# Patient Record
Sex: MALE | Race: BLACK OR AFRICAN AMERICAN | NOT HISPANIC OR LATINO | Employment: OTHER | URBAN - NONMETROPOLITAN AREA
[De-identification: names, ages, dates, MRNs, and addresses within clinical notes are randomized per-mention and may not be internally consistent; named-entity substitution may affect disease eponyms.]

---

## 2017-01-26 DIAGNOSIS — N20.0 CALCULUS OF KIDNEY: Primary | ICD-10-CM

## 2017-01-26 LAB — PSA SERPL-MCNC: 0.67 NG/ML (ref 0–4)

## 2017-02-01 DIAGNOSIS — N20.0 CALCULUS OF KIDNEY: Primary | ICD-10-CM

## 2017-02-02 ENCOUNTER — OFFICE VISIT (OUTPATIENT)
Dept: UROLOGY | Facility: CLINIC | Age: 67
End: 2017-02-02

## 2017-02-02 ENCOUNTER — HOSPITAL ENCOUNTER (OUTPATIENT)
Dept: GENERAL RADIOLOGY | Facility: HOSPITAL | Age: 67
Discharge: HOME | End: 2017-02-02
Attending: UROLOGY | Admitting: UROLOGY

## 2017-02-02 VITALS
WEIGHT: 269.6 LBS | TEMPERATURE: 97.6 F | BODY MASS INDEX: 34.6 KG/M2 | SYSTOLIC BLOOD PRESSURE: 150 MMHG | HEIGHT: 74 IN | DIASTOLIC BLOOD PRESSURE: 76 MMHG

## 2017-02-02 DIAGNOSIS — N40.1 BPH (BENIGN PROSTATIC HYPERTROPHY) WITH URINARY OBSTRUCTION: Primary | ICD-10-CM

## 2017-02-02 DIAGNOSIS — N20.0 RENAL STONE: ICD-10-CM

## 2017-02-02 DIAGNOSIS — N13.8 BPH (BENIGN PROSTATIC HYPERTROPHY) WITH URINARY OBSTRUCTION: Primary | ICD-10-CM

## 2017-02-02 DIAGNOSIS — N20.1 URETERAL STONE: ICD-10-CM

## 2017-02-02 DIAGNOSIS — N20.0 CALCULUS OF KIDNEY: ICD-10-CM

## 2017-02-02 DIAGNOSIS — N28.89 URETEROCELE: ICD-10-CM

## 2017-02-02 LAB
BILIRUB BLD-MCNC: NEGATIVE MG/DL
CLARITY, POC: CLEAR
COLOR UR: YELLOW
GLUCOSE UR STRIP-MCNC: NEGATIVE MG/DL
KETONES UR QL: NEGATIVE
LEUKOCYTE EST, POC: ABNORMAL
NITRITE UR-MCNC: NEGATIVE MG/ML
PH UR: 6.5 [PH] (ref 5–8)
PROT UR STRIP-MCNC: NEGATIVE MG/DL
RBC # UR STRIP: ABNORMAL /UL
SP GR UR: 1.02 (ref 1–1.03)
UROBILINOGEN UR QL: ABNORMAL

## 2017-02-02 PROCEDURE — 74000 HC ABDOMEN KUB: CPT

## 2017-02-02 PROCEDURE — 99214 OFFICE O/P EST MOD 30 MIN: CPT | Performed by: UROLOGY

## 2017-02-02 PROCEDURE — 81003 URINALYSIS AUTO W/O SCOPE: CPT | Performed by: UROLOGY

## 2017-02-02 RX ORDER — AMLODIPINE BESYLATE 5 MG/1
5 TABLET ORAL DAILY
Refills: 5 | COMMUNITY
Start: 2017-01-19

## 2017-02-02 RX ORDER — ALLOPURINOL 300 MG/1
300 TABLET ORAL DAILY
Refills: 3 | COMMUNITY
Start: 2017-01-19

## 2017-02-02 RX ORDER — MELOXICAM 7.5 MG/1
7.5 TABLET ORAL DAILY
COMMUNITY
Start: 2017-01-31

## 2017-02-02 RX ORDER — ATENOLOL 50 MG/1
50 TABLET ORAL DAILY
Refills: 3 | COMMUNITY
Start: 2017-01-05

## 2017-02-02 NOTE — PROGRESS NOTES
Subjective    Mr. Durán is 66 y.o. male    CHIEF COMPLAINT: BPH    The patient comes back today for follow-up of BPH clinically he is having a little more symptoms of low but urgency frequency as a way score today is 17 he has not had any flank pain or gross hematuria he has not any medications for his prostate he does take some allopurinol  his PSA is 0.6    We have also followed him for left renal calculus and on today's KUB looks like it is moved into the upper ureter from the renal pelvis where it has been for last 2-3 years    KUB independent review    A KUB is available for me to review today.  The image is inspected for a bowel gas pattern and the general bone structure of the spine and pelvis. The kidneys are then inspected closely.  Renal outline is noted if identifiable. The kidney, collecting system, and anticipated path of the ureter are examined for calcifications including those in the true pelvis.  This film reveals:    On the right there are no calcificaitons seen in the kidney or the expected course of the ureter. .    On the left there is a single proximal ureteral stone measuring 9 mm.    He also has a history of a ureterocele on the left that was resected and a stone was removed       History of Present Illness      The following portions of the patient's history were reviewed and updated as appropriate: allergies, current medications, past family history, past medical history, past social history, past surgical history and problem list.    Review of Systems   Constitutional: Negative.  Negative for chills and fever.   Gastrointestinal: Negative for abdominal distention, abdominal pain, anal bleeding, blood in stool and nausea.   Genitourinary: Positive for difficulty urinating, frequency and urgency. Negative for dysuria, flank pain, hematuria, penile pain and penile swelling.   Psychiatric/Behavioral: Negative.  Negative for agitation and confusion.         Current Outpatient Prescriptions:  "  •  Multiple Vitamins-Minerals (MULTIVITAMIN ADULT PO), Take  by mouth., Disp: , Rfl:   •  allopurinol (ZYLOPRIM) 300 MG tablet, , Disp: , Rfl: 3  •  amLODIPine (NORVASC) 5 MG tablet, , Disp: , Rfl: 5  •  atenolol (TENORMIN) 50 MG tablet, , Disp: , Rfl: 3  •  meloxicam (MOBIC) 7.5 MG tablet, , Disp: , Rfl:     Past Medical History   Diagnosis Date   • BPH with urinary obstruction    • Calculus of kidney    • Calculus of ureter    • Hypertension    • Microscopic hematuria    • Nocturia        Past Surgical History   Procedure Laterality Date   • Hemorrhoidectomy     • Knee surgery     • Carpal tunnel release         Social History     Social History   • Marital status:      Spouse name: N/A   • Number of children: N/A   • Years of education: N/A     Social History Main Topics   • Smoking status: Never Smoker   • Smokeless tobacco: None   • Alcohol use No   • Drug use: None   • Sexual activity: Not Asked     Other Topics Concern   • None     Social History Narrative   • None       Family History   Problem Relation Age of Onset   • No Known Problems Father    • No Known Problems Mother        Objective    Visit Vitals   • /76   • Temp 97.6 °F (36.4 °C)   • Ht 74\" (188 cm)   • Wt 269 lb 9.6 oz (122 kg)   • BMI 34.61 kg/m2       Physical Exam   Constitutional: He is oriented to person, place, and time. He appears well-developed and well-nourished.   Pulmonary/Chest: Effort normal.   Abdominal: Soft. He exhibits no distension and no mass. There is no tenderness. There is no rebound and no guarding. No hernia.   Genitourinary: Penis normal. Rectal exam shows no mass, no tenderness and anal tone normal. Enlarged: for the age of the patient. Right testis shows no mass, no swelling and no tenderness. Left testis shows no mass, no swelling and no tenderness. No hypospadias. No discharge found.   Genitourinary Comments:  The urethral meatus normal in position without evidence of stricture. Epididymis without mass " or tenderness. Vas Deferens is palpably normal.Anus and perineum without mass or tenderness. The prostate is approximately 25 ml. It is Symmetric, with a Soft consistency. There are no nodules present. . The seminal vesicles are Not palpable due to the size of the prostate.     Neurological: He is alert and oriented to person, place, and time.   Vitals reviewed.        Orders Only on 01/26/2017   Component Date Value Ref Range Status   • PSA 01/26/2017 0.668  0.000 - 4.000 ng/mL Final       Results for orders placed or performed in visit on 02/02/17   POC Urinalysis Dipstick, Automated   Result Value Ref Range    Color Yellow Yellow, Straw, Dark Yellow, Adrianne    Clarity, UA Clear Clear    Glucose, UA Negative Negative, 1000 mg/dL (3+) mg/dL    Bilirubin Negative Negative    Ketones, UA Negative Negative    Specific Gravity  1.025 1.005 - 1.030    Blood, UA Small (A) Negative    pH, Urine 6.5 5.0 - 8.0    Protein, POC Negative Negative mg/dL    Urobilinogen, UA 1 E.U./dL  (A) Normal    Leukocytes Trace (A) Negative    Nitrite, UA Negative Negative       Assessment and Plan    Martin was seen today for calculus of kidney.    Diagnoses and all orders for this visit:    BPH (benign prostatic hypertrophy) with urinary obstruction  -     POC Urinalysis Dipstick, Automated    Renal stone  -     XR Abdomen KUB; Future  -     US Renal Bilateral; Future    Ureterocele    Ureteral stone      Plan--from a BPH point review he has a little bit more symptoms and he does not want taking medications at this time    I did discuss a change in the stone with the patient is now on the proximal ureter.  He is basically asymptomatic he has had a pretty good size stone previously that got them ureterocele so we discussed the options were to watch this along a little bit on was seen back in a couple weeks with an ultrasound and KUB if he starts having severe pain fever etc. prior then he will call    [employer] Dragon/Transcription disclaimer:   Much of this encounter note is an electronic transcription/translation of spoken language to printed text. The electronic translation of spoken language may permit erroneous, or at times, nonsensical words or phrases to be inadvertently transcribed; although I have reviewed the note for such errors, some may still exist.

## 2017-02-17 ENCOUNTER — HOSPITAL ENCOUNTER (OUTPATIENT)
Dept: ULTRASOUND IMAGING | Facility: HOSPITAL | Age: 67
Discharge: HOME | End: 2017-02-17
Attending: UROLOGY | Admitting: UROLOGY

## 2017-02-17 DIAGNOSIS — N20.0 RENAL STONE: ICD-10-CM

## 2017-02-17 PROCEDURE — 76775 US EXAM ABDO BACK WALL LIM: CPT

## 2017-02-22 ENCOUNTER — HOSPITAL ENCOUNTER (OUTPATIENT)
Dept: GENERAL RADIOLOGY | Facility: HOSPITAL | Age: 67
Discharge: HOME | End: 2017-02-22
Attending: UROLOGY | Admitting: UROLOGY

## 2017-02-22 ENCOUNTER — OFFICE VISIT (OUTPATIENT)
Dept: UROLOGY | Facility: CLINIC | Age: 67
End: 2017-02-22

## 2017-02-22 VITALS — BODY MASS INDEX: 31.24 KG/M2 | TEMPERATURE: 98.8 F | HEIGHT: 78 IN | WEIGHT: 270 LBS

## 2017-02-22 DIAGNOSIS — N40.1 BPH (BENIGN PROSTATIC HYPERTROPHY) WITH URINARY OBSTRUCTION: ICD-10-CM

## 2017-02-22 DIAGNOSIS — N20.0 RENAL STONE: ICD-10-CM

## 2017-02-22 DIAGNOSIS — N20.1 URETERAL STONE: Primary | ICD-10-CM

## 2017-02-22 DIAGNOSIS — N13.8 BPH (BENIGN PROSTATIC HYPERTROPHY) WITH URINARY OBSTRUCTION: ICD-10-CM

## 2017-02-22 DIAGNOSIS — N20.0 CALCULUS OF KIDNEY: ICD-10-CM

## 2017-02-22 DIAGNOSIS — N13.2 HYDRONEPHROSIS WITH RENAL AND URETERAL CALCULUS OBSTRUCTION: ICD-10-CM

## 2017-02-22 DIAGNOSIS — N28.89 URETEROCELE: ICD-10-CM

## 2017-02-22 LAB
BILIRUB BLD-MCNC: NEGATIVE MG/DL
CLARITY, POC: CLEAR
COLOR UR: YELLOW
GLUCOSE UR STRIP-MCNC: NEGATIVE MG/DL
KETONES UR QL: NEGATIVE
LEUKOCYTE EST, POC: ABNORMAL
NITRITE UR-MCNC: NEGATIVE MG/ML
PH UR: 7 [PH] (ref 5–8)
PROT UR STRIP-MCNC: NEGATIVE MG/DL
RBC # UR STRIP: ABNORMAL /UL
SP GR UR: 1.02 (ref 1–1.03)
UROBILINOGEN UR QL: NORMAL

## 2017-02-22 PROCEDURE — 99214 OFFICE O/P EST MOD 30 MIN: CPT | Performed by: UROLOGY

## 2017-02-22 PROCEDURE — 81003 URINALYSIS AUTO W/O SCOPE: CPT | Performed by: UROLOGY

## 2017-02-22 PROCEDURE — 74000 HC ABDOMEN KUB: CPT

## 2017-02-22 RX ORDER — TAMSULOSIN HYDROCHLORIDE 0.4 MG/1
1 CAPSULE ORAL DAILY
Refills: 5 | COMMUNITY
Start: 2017-02-02 | End: 2018-02-19 | Stop reason: SDUPTHER

## 2017-02-22 NOTE — PROGRESS NOTES
Subjective    Mr. Durán is 66 y.o. male    CHIEF COMPLAINT: Kidney stone    The patient comes back today for follow-up of left upper ureteral calculus since we have last seen him he is had no significant pain no fever no burning stinging urgency frequency etc. he did get an ultrasound and a KUB which I am reviewing below    KUB independent review    A KUB is available for me to review today.  The image is inspected for a bowel gas pattern and the general bone structure of the spine and pelvis. The kidneys are then inspected closely.  Renal outline is noted if identifiable. The kidney, collecting system, and anticipated path of the ureter are examined for calcifications including those in the true pelvis.  This film reveals:    On the right there are no calcificaitons seen in the kidney or the expected course of the ureter. .    On the left there is a single proximal ureteral stone measuring 9 mm.    Renal ultrasound independent review    The renal ultrasound is available for me to review.  Treatment recommendations require an independent review.  This film has been reviewed by the radiologist to determine any non urologic abnormalities that are presents.  However, I very closely inspected the kidneys for size, symmetry, contour, parenchymal thickness, perinephric reaction, presence of calcifications, and intrarenal dilation of the collecting system.       The right kidney appears abnormal on this ultrasound.     Normal parenchymal thickness, No hydronephrosis, No hydroureter and Single renal cyst measuring 6 mm    The left kidney appears this shows left hydronephrosis he also has a benign simple cyst and a small calculus in the kidney as well there are no solid renal masses    The bladder appears normal on thisultrsaound.  The bladder appears normal in thickness.  There no masses or stones seen on this exam.      History of Present Illness      The following portions of the patient's history were reviewed and  "updated as appropriate: allergies, current medications, past family history, past medical history, past social history, past surgical history and problem list.    Review of Systems   Constitutional: Negative for chills and fever.   Gastrointestinal: Negative for abdominal pain, anal bleeding and blood in stool.   Genitourinary: Negative for flank pain and hematuria.         Current Outpatient Prescriptions:   •  allopurinol (ZYLOPRIM) 300 MG tablet, , Disp: , Rfl: 3  •  amLODIPine (NORVASC) 5 MG tablet, , Disp: , Rfl: 5  •  atenolol (TENORMIN) 50 MG tablet, , Disp: , Rfl: 3  •  meloxicam (MOBIC) 7.5 MG tablet, , Disp: , Rfl:   •  Multiple Vitamins-Minerals (MULTIVITAMIN ADULT PO), Take  by mouth., Disp: , Rfl:   •  tamsulosin (FLOMAX) 0.4 MG capsule 24 hr capsule, , Disp: , Rfl: 5    Past Medical History   Diagnosis Date   • BPH with urinary obstruction    • Calculus of kidney    • Calculus of ureter    • Hypertension    • Microscopic hematuria    • Nocturia        Past Surgical History   Procedure Laterality Date   • Hemorrhoidectomy     • Knee surgery     • Carpal tunnel release         Social History     Social History   • Marital status:      Spouse name: N/A   • Number of children: N/A   • Years of education: N/A     Social History Main Topics   • Smoking status: Never Smoker   • Smokeless tobacco: None   • Alcohol use No   • Drug use: None   • Sexual activity: Not Asked     Other Topics Concern   • None     Social History Narrative       Family History   Problem Relation Age of Onset   • No Known Problems Father    • No Known Problems Mother        Objective    Visit Vitals   • Temp 98.8 °F (37.1 °C)   • Ht 79\" (200.7 cm)   • Wt 270 lb (122 kg)   • BMI 30.42 kg/m2       Physical Exam      Office Visit on 02/02/2017   Component Date Value Ref Range Status   • Color 02/02/2017 Yellow  Yellow, Straw, Dark Yellow, Adrianne Final   • Clarity, UA 02/02/2017 Clear  Clear Final   • Glucose, UA 02/02/2017 Negative  " Negative, 1000 mg/dL (3+) mg/dL Final   • Bilirubin 02/02/2017 Negative  Negative Final   • Ketones, UA 02/02/2017 Negative  Negative Final   • Specific Gravity  02/02/2017 1.025  1.005 - 1.030 Final   • Blood, UA 02/02/2017 Small* Negative Final   • pH, Urine 02/02/2017 6.5  5.0 - 8.0 Final   • Protein, POC 02/02/2017 Negative  Negative mg/dL Final   • Urobilinogen, UA 02/02/2017 1 E.U./dL * Normal Final   • Leukocytes 02/02/2017 Trace* Negative Final   • Nitrite, UA 02/02/2017 Negative  Negative Final       Results for orders placed or performed in visit on 02/22/17   POC Urinalysis Dipstick, Automated   Result Value Ref Range    Color Yellow Yellow, Straw, Dark Yellow, Adrianne    Clarity, UA Clear Clear    Glucose, UA Negative Negative, 1000 mg/dL (3+) mg/dL    Bilirubin Negative Negative    Ketones, UA Negative Negative    Specific Gravity  1.025 1.005 - 1.030    Blood, UA Trace (A) Negative    pH, Urine 7.0 5.0 - 8.0    Protein, POC Negative Negative mg/dL    Urobilinogen, UA Normal Normal    Leukocytes Trace (A) Negative    Nitrite, UA Negative Negative       Assessment and Plan    Diagnoses and all orders for this visit:    Ureteral stone  -     POC Urinalysis Dipstick, Automated    Ureterocele    Calculus of kidney    BPH (benign prostatic hypertrophy) with urinary obstruction    Hydronephrosis with renal and ureteral calculus obstruction        Plan--I discussed the findings with the patient I am concerned about the new finding of a left hydronephrosis and I recommended that we go ahead and proceed with surgical intervention discussed the options including ureteroscopy and laser lithotripsy with stent versus an ESWL.  I think this stone would be quite a minimal to ESWL will go ahead and schedule him have that done next week.    Risks and complication procedure he is artery on tamsulosin so we will continue that all these were discussed also gave him information booklets had no further questions at this time  if he should have any change in his symptoms prior then he will call    [facility] Dragon/Transcription disclaimer:  Much of this encounter note is an electronic transcription/translation of spoken language to printed text. The electronic translation of spoken language may permit erroneous, or at times, nonsensical words or phrases to be inadvertently transcribed; although I have reviewed the note for such errors, some may still exist.

## 2017-02-24 ENCOUNTER — APPOINTMENT (OUTPATIENT)
Dept: PREADMISSION TESTING | Facility: HOSPITAL | Age: 67
End: 2017-02-24

## 2017-02-24 VITALS
SYSTOLIC BLOOD PRESSURE: 160 MMHG | BODY MASS INDEX: 34.39 KG/M2 | HEIGHT: 74 IN | OXYGEN SATURATION: 100 % | RESPIRATION RATE: 20 BRPM | DIASTOLIC BLOOD PRESSURE: 70 MMHG | WEIGHT: 268 LBS | HEART RATE: 62 BPM

## 2017-02-24 LAB
ANION GAP SERPL CALCULATED.3IONS-SCNC: 10 MMOL/L (ref 4–13)
APTT PPP: 34.2 SECONDS (ref 24.1–34.8)
BUN BLD-MCNC: 21 MG/DL (ref 5–21)
BUN/CREAT SERPL: 18.9 (ref 7–25)
CALCIUM SPEC-SCNC: 9.5 MG/DL (ref 8.4–10.4)
CHLORIDE SERPL-SCNC: 104 MMOL/L (ref 98–110)
CO2 SERPL-SCNC: 29 MMOL/L (ref 24–31)
CREAT BLD-MCNC: 1.11 MG/DL (ref 0.5–1.4)
DEPRECATED RDW RBC AUTO: 45.6 FL (ref 40–54)
ERYTHROCYTE [DISTWIDTH] IN BLOOD BY AUTOMATED COUNT: 13.8 % (ref 12–15)
GFR SERPL CREATININE-BSD FRML MDRD: 81 ML/MIN/1.73
GLUCOSE BLD-MCNC: 95 MG/DL (ref 70–100)
HCT VFR BLD AUTO: 36.6 % (ref 40–52)
HGB BLD-MCNC: 12.4 G/DL (ref 14–18)
INR PPP: 1.06 (ref 0.91–1.09)
MCH RBC QN AUTO: 30.7 PG (ref 28–32)
MCHC RBC AUTO-ENTMCNC: 33.9 G/DL (ref 33–36)
MCV RBC AUTO: 90.6 FL (ref 82–95)
PLATELET # BLD AUTO: 133 10*3/MM3 (ref 130–400)
PMV BLD AUTO: 11.1 FL (ref 6–12)
POTASSIUM BLD-SCNC: 4.2 MMOL/L (ref 3.5–5.3)
PROTHROMBIN TIME: 14.1 SECONDS (ref 11.9–14.6)
RBC # BLD AUTO: 4.04 10*6/MM3 (ref 4.8–5.9)
SODIUM BLD-SCNC: 143 MMOL/L (ref 135–145)
WBC NRBC COR # BLD: 3.78 10*3/MM3 (ref 4.8–10.8)

## 2017-02-24 PROCEDURE — 93005 ELECTROCARDIOGRAM TRACING: CPT

## 2017-02-24 PROCEDURE — 85027 COMPLETE CBC AUTOMATED: CPT | Performed by: UROLOGY

## 2017-02-24 PROCEDURE — 85730 THROMBOPLASTIN TIME PARTIAL: CPT | Performed by: UROLOGY

## 2017-02-24 PROCEDURE — 93010 ELECTROCARDIOGRAM REPORT: CPT | Performed by: INTERNAL MEDICINE

## 2017-02-24 PROCEDURE — 85610 PROTHROMBIN TIME: CPT | Performed by: UROLOGY

## 2017-02-24 PROCEDURE — 36415 COLL VENOUS BLD VENIPUNCTURE: CPT | Performed by: UROLOGY

## 2017-02-24 PROCEDURE — 80048 BASIC METABOLIC PNL TOTAL CA: CPT | Performed by: UROLOGY

## 2017-02-27 ENCOUNTER — RESULTS ENCOUNTER (OUTPATIENT)
Dept: UROLOGY | Facility: CLINIC | Age: 67
End: 2017-02-27

## 2017-02-27 DIAGNOSIS — N20.1 URETERAL STONE: ICD-10-CM

## 2017-03-01 ENCOUNTER — HOSPITAL ENCOUNTER (OUTPATIENT)
Dept: GENERAL RADIOLOGY | Facility: HOSPITAL | Age: 67
Discharge: HOME | End: 2017-03-01

## 2017-03-01 ENCOUNTER — HOSPITAL ENCOUNTER (OUTPATIENT)
Facility: HOSPITAL | Age: 67
Discharge: HOME | End: 2017-03-01
Attending: UROLOGY | Admitting: UROLOGY

## 2017-03-01 ENCOUNTER — ANESTHESIA EVENT (OUTPATIENT)
Dept: PERIOP | Facility: HOSPITAL | Age: 67
End: 2017-03-01

## 2017-03-01 ENCOUNTER — ANESTHESIA (OUTPATIENT)
Dept: PERIOP | Facility: HOSPITAL | Age: 67
End: 2017-03-01

## 2017-03-01 VITALS
TEMPERATURE: 98 F | OXYGEN SATURATION: 95 % | DIASTOLIC BLOOD PRESSURE: 81 MMHG | RESPIRATION RATE: 16 BRPM | HEART RATE: 73 BPM | SYSTOLIC BLOOD PRESSURE: 169 MMHG

## 2017-03-01 DIAGNOSIS — N20.1 URETERAL STONE: ICD-10-CM

## 2017-03-01 PROCEDURE — 50590 FRAGMENTING OF KIDNEY STONE: CPT | Performed by: UROLOGY

## 2017-03-01 PROCEDURE — 25010000003 CEFAZOLIN PER 500 MG: Performed by: UROLOGY

## 2017-03-01 PROCEDURE — 25010000002 PROPOFOL 10 MG/ML EMULSION: Performed by: NURSE ANESTHETIST, CERTIFIED REGISTERED

## 2017-03-01 PROCEDURE — 25010000002 ONDANSETRON PER 1 MG: Performed by: NURSE ANESTHETIST, CERTIFIED REGISTERED

## 2017-03-01 PROCEDURE — 25010000002 DEXAMETHASONE PER 1 MG: Performed by: NURSE ANESTHETIST, CERTIFIED REGISTERED

## 2017-03-01 PROCEDURE — 25010000002 FENTANYL CITRATE (PF) 100 MCG/2ML SOLUTION: Performed by: NURSE ANESTHETIST, CERTIFIED REGISTERED

## 2017-03-01 PROCEDURE — 25010000002 KETOROLAC TROMETHAMINE PER 15 MG: Performed by: NURSE ANESTHETIST, CERTIFIED REGISTERED

## 2017-03-01 PROCEDURE — 74000 HC ABDOMEN KUB: CPT

## 2017-03-01 PROCEDURE — 25010000002 MIDAZOLAM PER 1 MG: Performed by: ANESTHESIOLOGY

## 2017-03-01 RX ORDER — SODIUM CHLORIDE, SODIUM LACTATE, POTASSIUM CHLORIDE, CALCIUM CHLORIDE 600; 310; 30; 20 MG/100ML; MG/100ML; MG/100ML; MG/100ML
100 INJECTION, SOLUTION INTRAVENOUS CONTINUOUS
Status: DISCONTINUED | OUTPATIENT
Start: 2017-03-01 | End: 2017-03-01 | Stop reason: HOSPADM

## 2017-03-01 RX ORDER — CHLORHEXIDINE GLUCONATE 0.12 MG/ML
15 RINSE ORAL 2 TIMES DAILY
COMMUNITY
End: 2017-10-10

## 2017-03-01 RX ORDER — PROPOFOL 10 MG/ML
VIAL (ML) INTRAVENOUS AS NEEDED
Status: DISCONTINUED | OUTPATIENT
Start: 2017-03-01 | End: 2017-03-01 | Stop reason: SURG

## 2017-03-01 RX ORDER — SODIUM CHLORIDE 0.9 % (FLUSH) 0.9 %
1-10 SYRINGE (ML) INJECTION AS NEEDED
Status: DISCONTINUED | OUTPATIENT
Start: 2017-03-01 | End: 2017-03-01 | Stop reason: HOSPADM

## 2017-03-01 RX ORDER — MIDAZOLAM HYDROCHLORIDE 1 MG/ML
2 INJECTION INTRAMUSCULAR; INTRAVENOUS
Status: DISCONTINUED | OUTPATIENT
Start: 2017-03-01 | End: 2017-03-01 | Stop reason: HOSPADM

## 2017-03-01 RX ORDER — LIDOCAINE HYDROCHLORIDE 20 MG/ML
INJECTION, SOLUTION INFILTRATION; PERINEURAL AS NEEDED
Status: DISCONTINUED | OUTPATIENT
Start: 2017-03-01 | End: 2017-03-01 | Stop reason: SURG

## 2017-03-01 RX ORDER — FENTANYL CITRATE 50 UG/ML
INJECTION, SOLUTION INTRAMUSCULAR; INTRAVENOUS AS NEEDED
Status: DISCONTINUED | OUTPATIENT
Start: 2017-03-01 | End: 2017-03-01 | Stop reason: SURG

## 2017-03-01 RX ORDER — MIDAZOLAM HYDROCHLORIDE 1 MG/ML
1 INJECTION INTRAMUSCULAR; INTRAVENOUS
Status: DISCONTINUED | OUTPATIENT
Start: 2017-03-01 | End: 2017-03-01 | Stop reason: HOSPADM

## 2017-03-01 RX ORDER — ONDANSETRON 2 MG/ML
INJECTION INTRAMUSCULAR; INTRAVENOUS AS NEEDED
Status: DISCONTINUED | OUTPATIENT
Start: 2017-03-01 | End: 2017-03-01 | Stop reason: SURG

## 2017-03-01 RX ORDER — DEXAMETHASONE SODIUM PHOSPHATE 4 MG/ML
INJECTION, SOLUTION INTRA-ARTICULAR; INTRALESIONAL; INTRAMUSCULAR; INTRAVENOUS; SOFT TISSUE AS NEEDED
Status: DISCONTINUED | OUTPATIENT
Start: 2017-03-01 | End: 2017-03-01 | Stop reason: SURG

## 2017-03-01 RX ORDER — KETOROLAC TROMETHAMINE 30 MG/ML
INJECTION, SOLUTION INTRAMUSCULAR; INTRAVENOUS AS NEEDED
Status: DISCONTINUED | OUTPATIENT
Start: 2017-03-01 | End: 2017-03-01 | Stop reason: SURG

## 2017-03-01 RX ORDER — HYDROCODONE BITARTRATE AND ACETAMINOPHEN 5; 325 MG/1; MG/1
1 TABLET ORAL EVERY 4 HOURS PRN
Qty: 20 TABLET | Refills: 0 | Status: SHIPPED | OUTPATIENT
Start: 2017-03-01 | End: 2017-03-08

## 2017-03-01 RX ADMIN — SODIUM CHLORIDE, POTASSIUM CHLORIDE, SODIUM LACTATE AND CALCIUM CHLORIDE: 600; 310; 30; 20 INJECTION, SOLUTION INTRAVENOUS at 16:00

## 2017-03-01 RX ADMIN — FENTANYL CITRATE 50 MCG: 50 INJECTION, SOLUTION INTRAMUSCULAR; INTRAVENOUS at 14:58

## 2017-03-01 RX ADMIN — EPHEDRINE SULFATE 10 MG: 50 INJECTION INTRAMUSCULAR; INTRAVENOUS; SUBCUTANEOUS at 15:50

## 2017-03-01 RX ADMIN — ONDANSETRON HYDROCHLORIDE 4 MG: 2 SOLUTION INTRAMUSCULAR; INTRAVENOUS at 15:01

## 2017-03-01 RX ADMIN — KETOROLAC TROMETHAMINE 30 MG: 30 INJECTION, SOLUTION INTRAMUSCULAR at 16:10

## 2017-03-01 RX ADMIN — LIDOCAINE HYDROCHLORIDE 0.5 ML: 10 INJECTION, SOLUTION EPIDURAL; INFILTRATION; INTRACAUDAL; PERINEURAL at 14:42

## 2017-03-01 RX ADMIN — MIDAZOLAM HYDROCHLORIDE 2 MG: 1 INJECTION, SOLUTION INTRAMUSCULAR; INTRAVENOUS at 14:44

## 2017-03-01 RX ADMIN — CEFAZOLIN SODIUM 2 G: 2 SOLUTION INTRAVENOUS at 15:01

## 2017-03-01 RX ADMIN — LIDOCAINE HYDROCHLORIDE 100 MG: 20 INJECTION, SOLUTION INFILTRATION; PERINEURAL at 14:58

## 2017-03-01 RX ADMIN — FENTANYL CITRATE 50 MCG: 50 INJECTION, SOLUTION INTRAMUSCULAR; INTRAVENOUS at 15:07

## 2017-03-01 RX ADMIN — DEXAMETHASONE SODIUM PHOSPHATE 4 MG: 4 INJECTION, SOLUTION INTRAMUSCULAR; INTRAVENOUS at 15:01

## 2017-03-01 RX ADMIN — PROPOFOL 200 MG: 10 INJECTION, EMULSION INTRAVENOUS at 14:58

## 2017-03-01 RX ADMIN — SODIUM CHLORIDE, POTASSIUM CHLORIDE, SODIUM LACTATE AND CALCIUM CHLORIDE 100 ML/HR: 600; 310; 30; 20 INJECTION, SOLUTION INTRAVENOUS at 14:44

## 2017-03-01 NOTE — ANESTHESIA POSTPROCEDURE EVALUATION
Patient: Martin Durán    Procedure Summary     Date Anesthesia Start Anesthesia Stop Room / Location    03/01/17 1454 1621  [facility] OR 08 / [facility]       Procedure Diagnosis Surgeon Provider    LEFT EXTRACORPOREAL SHOCKWAVE LITHOTRIPSY (Left ) Ureteral stone  (Ureteral stone [N20.1]) MD David Cifuentes CRNA          Anesthesia Type: general  Last vitals  /48 (03/01/17 1639)    Temp 96.7 °F (35.9 °C) (03/01/17 1619)    Pulse 72 (03/01/17 1639)   Resp 14 (03/01/17 1644)    SpO2 100 % (03/01/17 1639)      Post Anesthesia Care and Evaluation    Patient location during evaluation: PACU  Patient participation: complete - patient participated  Level of consciousness: awake and alert  Pain management: adequate  Airway patency: patent  Anesthetic complications: No anesthetic complications  PONV Status: none  Cardiovascular status: acceptable  Respiratory status: acceptable  Hydration status: acceptable

## 2017-03-01 NOTE — OP NOTE
OP NOTE  Martin Durán    Date of Procedure: 3/1/2017    Pre-op Diagnosis:   Ureteral stone [N20.1]    Post-op Diagnosis:     Post-Op Diagnosis Codes:     * Ureteral stone [N20.1]    Procedure/CPT® Codes:      Procedure(s):  LEFT EXTRACORPOREAL SHOCKWAVE LITHOTRIPSY    Surgeon(s):  Emanuel Franco MD    Anesthesia: General    Staff:   Circulator: Eladio Dennis RN; Horacio Payne RN  Scrub Person: Emanuel Hoyos; Corinna Burns; Taryn Keating    Indications: Patient has  A stone  measuring approximately 10 mm in the left Ureteropelvic junction.  After discussion of options, patient has given informed consent to undergo left ESWL.  All risks, benefits, and alternatives were discussed.    Operative Report: The patient is identified. The KUB is reviewed. After answering any questions, patient was brought to the operating room and placed on the patient table of the [facility].  General endotracheal anesthesia was administered.  Preoperative KUB was reviewed.   An [facility] c-arm fluoroscope was used to identify the stone.    The shock-head is brought into postion.  This stone is brought into the F2 plane for focus.  Treatment was initiated.  We gradually increased the energy level from 1 to 8.  After the first 2,000 shocks  This stone was treated at a rate of 60 after the first 1000 shocks indicated that it was not breaking up well.  We paused for 2 minutes after 300 shocks to reduce risk of renal damage.  The stone was periodically checked to make sure that we were on it and getting good breakup.  We checked at 700, 1000, 1500, 2000, and 2500 shocks.  The stone did not have good breakup.  I felt it was unnecessary to place a ureteral stent.  At the conclusion of 4000 shocks, the patient was awakened from anesthesia and transferred to the recovery room in satisfactory condition.      Given the stone did not appear to be breaking up very well so we slowed the rate and increased the power setting to 8 and went  up to a total of 4000 shocks look like that the stone was just below the lower pole of the left kidney again there was maybe a little bit of spreading of the stone but certainly it did not have obvious good fragmentation    Estimated Blood Loss:  0    Specimens:                * No specimens in log *      Drains:         EMR Dragon/Transcription disclaimer:  Much of this encounter note is an electronic transcription/translation of spoken language to printed text. The electronic translation of spoken language may permit erroneous, or at times, nonsensical words or phrases to be inadvertently transcribed; although I have reviewed the note for such errors, some may still exist.   Complications: none  Plan: Follow-up 1 week with a LIAM Franco MD     Date: 3/1/2017  Time: 4:08 PM

## 2017-03-01 NOTE — ANESTHESIA PREPROCEDURE EVALUATION
Anesthesia Evaluation     NPO Status: > 8 hours   Airway   Mallampati: II  TM distance: >3 FB  Neck ROM: full  no difficulty expected  Dental - normal exam     Pulmonary - normal exam   Cardiovascular - normal exam    ECG reviewed  Beta blocker given within 24 hours of surgery    (+) hypertension well controlled,       Neuro/Psych  GI/Hepatic/Renal/Endo    (+)  chronic renal disease stones,     Musculoskeletal     Abdominal  - normal exam   Substance History      OB/GYN          Other   (+) arthritis                                 Anesthesia Plan    ASA 3     general     intravenous induction   Anesthetic plan and risks discussed with patient.    Plan discussed with CRNA.

## 2017-03-01 NOTE — DISCHARGE INSTRUCTIONS

## 2017-03-02 ENCOUNTER — TELEPHONE (OUTPATIENT)
Dept: UROLOGY | Facility: CLINIC | Age: 67
End: 2017-03-02

## 2017-03-02 NOTE — PLAN OF CARE
Problem: Patient Care Overview (Adult)  Goal: Plan of Care Review  Outcome: Outcome(s) achieved Date Met:  03/01/17 03/01/17 1800   Coping/Psychosocial Response Interventions   Plan Of Care Reviewed With patient;spouse   Patient Care Overview   Progress improving   Outcome Evaluation   Outcome Summary/Follow up Plan pt voids easily,yellow urine noclots,denies pain,meets criteria,ready for d/c

## 2017-03-02 NOTE — PLAN OF CARE
Problem: Perioperative Period (Adult)  Goal: Signs and Symptoms of Listed Potential Problems Will be Absent or Manageable (Perioperative Period)  Outcome: Outcome(s) achieved Date Met:  03/01/17

## 2017-03-08 ENCOUNTER — HOSPITAL ENCOUNTER (OUTPATIENT)
Dept: GENERAL RADIOLOGY | Facility: HOSPITAL | Age: 67
Discharge: HOME | End: 2017-03-08
Attending: UROLOGY | Admitting: UROLOGY

## 2017-03-08 ENCOUNTER — OFFICE VISIT (OUTPATIENT)
Dept: UROLOGY | Facility: CLINIC | Age: 67
End: 2017-03-08

## 2017-03-08 VITALS — TEMPERATURE: 97.3 F | BODY MASS INDEX: 34.01 KG/M2 | WEIGHT: 265 LBS | HEIGHT: 74 IN

## 2017-03-08 DIAGNOSIS — N20.1 URETERAL STONE: ICD-10-CM

## 2017-03-08 DIAGNOSIS — N20.1 URETERAL STONE: Primary | ICD-10-CM

## 2017-03-08 DIAGNOSIS — N20.0 CALCULUS OF KIDNEY: ICD-10-CM

## 2017-03-08 PROCEDURE — 74000 HC ABDOMEN KUB: CPT

## 2017-03-08 PROCEDURE — 99024 POSTOP FOLLOW-UP VISIT: CPT | Performed by: UROLOGY

## 2017-03-08 NOTE — PROGRESS NOTES
Subjective    Mr. Durán is 66 y.o. male    CHIEF COMPLAINT: Postop ESWL    He has done great but really has had no symptoms had really passed any fragments    KUB independent review    A KUB is available for me to review today.  The image is inspected for a bowel gas pattern and the general bone structure of the spine and pelvis. The kidneys are then inspected closely.  Renal outline is noted if identifiable. The kidney, collecting system, and anticipated path of the ureter are examined for calcifications including those in the true pelvis.  This film reveals:    On the right there are no calcificaitons seen in the kidney or the expected course of the ureter. .    On the left there there is some faint relatively small calcification seen in the left lower pole now may be one still around the UPJ relatively small and faint I do not see any along the course of the ureter.  History of Present Illness      The following portions of the patient's history were reviewed and updated as appropriate: allergies, current medications, past family history, past medical history, past social history, past surgical history and problem list.    Review of Systems   Constitutional: Negative for chills and fever.   Gastrointestinal: Negative for abdominal pain, anal bleeding and blood in stool.   Genitourinary: Negative for flank pain and hematuria.         Current Outpatient Prescriptions:   •  allopurinol (ZYLOPRIM) 300 MG tablet, , Disp: , Rfl: 3  •  amLODIPine (NORVASC) 5 MG tablet, , Disp: , Rfl: 5  •  atenolol (TENORMIN) 50 MG tablet, , Disp: , Rfl: 3  •  chlorhexidine (PERIDEX) 0.12 % solution, Apply 15 mL to the mouth or throat 2 (Two) Times a Day., Disp: , Rfl:   •  meloxicam (MOBIC) 7.5 MG tablet, , Disp: , Rfl:   •  Multiple Vitamins-Minerals (MULTIVITAMIN ADULT PO), Take  by mouth., Disp: , Rfl:   •  tamsulosin (FLOMAX) 0.4 MG capsule 24 hr capsule, , Disp: , Rfl: 5    Past Medical History   Diagnosis Date   • Arthritis       "gout   • BPH with urinary obstruction    • Calculus of kidney    • Calculus of ureter    • Hearing loss    • Hypertension    • Microscopic hematuria    • Nocturia        Past Surgical History   Procedure Laterality Date   • Hemorrhoidectomy     • Knee surgery     • Carpal tunnel release     • Extracorporeal shockwave lithotripsy (eswl), stent insertion/removal     • Extracorporeal shock wave lithotripsy (eswl) Left 3/1/2017     Procedure: LEFT EXTRACORPOREAL SHOCKWAVE LITHOTRIPSY;  Surgeon: Emanuel Franco MD;  Location: [facility];  Service:        Social History     Social History   • Marital status:      Spouse name: N/A   • Number of children: N/A   • Years of education: N/A     Social History Main Topics   • Smoking status: Never Smoker   • Smokeless tobacco: None   • Alcohol use No   • Drug use: No   • Sexual activity: Not Asked     Other Topics Concern   • None     Social History Narrative       Family History   Problem Relation Age of Onset   • No Known Problems Father    • No Known Problems Mother        Objective    Visit Vitals   • Temp 97.3 °F (36.3 °C)   • Ht 74\" (188 cm)   • Wt 265 lb (120 kg)   • BMI 34.02 kg/m2       Physical Exam      Results Encounter on 02/27/2017   Component Date Value Ref Range Status   • PTT 02/24/2017 34.2  24.1 - 34.8 seconds Final   • Protime 02/24/2017 14.1  11.9 - 14.6 Seconds Final   • INR 02/24/2017 1.06  0.91 - 1.09 Final       Results for orders placed or performed in visit on 02/27/17   APTT   Result Value Ref Range    PTT 34.2 24.1 - 34.8 seconds   Protime-INR   Result Value Ref Range    Protime 14.1 11.9 - 14.6 Seconds    INR 1.06 0.91 - 1.09       Assessment and Plan    Diagnoses and all orders for this visit:    Ureteral stone    Calculus of kidney   plan--he is gotten much better response and I thought when we finished ESWL I thought that the stone really had not done a lot but also looks like we corrected a better than I thought.    I showed them the " x-rays with encouragement lots of fluids etc. he will call me starts having severe pain or fever otherwise we will seen back in about 3 weeks with a KUB    EMR Dragon/Transcription disclaimer:  Much of this encounter note is an electronic transcription/translation of spoken language to printed text. The electronic translation of spoken language may permit erroneous, or at times, nonsensical words or phrases to be inadvertently transcribed; although I have reviewed the note for such errors, some may still exist.

## 2017-03-31 ENCOUNTER — OFFICE VISIT (OUTPATIENT)
Dept: UROLOGY | Facility: CLINIC | Age: 67
End: 2017-03-31

## 2017-03-31 ENCOUNTER — HOSPITAL ENCOUNTER (OUTPATIENT)
Dept: GENERAL RADIOLOGY | Facility: HOSPITAL | Age: 67
Discharge: HOME | End: 2017-03-31
Attending: UROLOGY | Admitting: UROLOGY

## 2017-03-31 VITALS
HEIGHT: 74 IN | BODY MASS INDEX: 34.27 KG/M2 | SYSTOLIC BLOOD PRESSURE: 140 MMHG | WEIGHT: 267 LBS | DIASTOLIC BLOOD PRESSURE: 68 MMHG | TEMPERATURE: 97 F

## 2017-03-31 DIAGNOSIS — N20.1 URETERAL STONE: ICD-10-CM

## 2017-03-31 DIAGNOSIS — N20.0 CALCULUS OF KIDNEY: Primary | ICD-10-CM

## 2017-03-31 DIAGNOSIS — N13.2 HYDRONEPHROSIS WITH RENAL AND URETERAL CALCULUS OBSTRUCTION: ICD-10-CM

## 2017-03-31 LAB
BILIRUB BLD-MCNC: NEGATIVE MG/DL
CLARITY, POC: CLEAR
COLOR UR: YELLOW
GLUCOSE UR STRIP-MCNC: NEGATIVE MG/DL
KETONES UR QL: ABNORMAL
LEUKOCYTE EST, POC: NEGATIVE
NITRITE UR-MCNC: NEGATIVE MG/ML
PH UR: 6.5 [PH] (ref 5–8)
PROT UR STRIP-MCNC: NEGATIVE MG/DL
RBC # UR STRIP: ABNORMAL /UL
SP GR UR: 1.03 (ref 1–1.03)
UROBILINOGEN UR QL: NORMAL

## 2017-03-31 PROCEDURE — 99024 POSTOP FOLLOW-UP VISIT: CPT | Performed by: UROLOGY

## 2017-03-31 PROCEDURE — 74000 HC ABDOMEN KUB: CPT

## 2017-03-31 PROCEDURE — 81003 URINALYSIS AUTO W/O SCOPE: CPT | Performed by: UROLOGY

## 2017-03-31 NOTE — PROGRESS NOTES
Subjective    Mr. Durán is 67 y.o. male    CHIEF COMPLAINT:  Follow-p ESWL     the patient comes back toa hi asymptomatic he is not rally passed any fragentsas far as he is aware on his KUB toda on thi time I can see some little teeny fragments in the lower pole also may be a fragment across from L3 as well tis as not notd previousy gain e remains aymptomatic      History of Present Illness      The following portions of the patient's history were reviewed and updated as appropriate: allergies, current medications, past family history, past medical history, past social history, past surgical history and problem list.    Review of Systems   Constitutional: Negative for fever.   Gastrointestinal: Negative for nausea and vomiting.   Genitourinary: Positive for frequency (maybe once a night). Negative for difficulty urinating and urgency.         Current Outpatient Prescriptions:   •  allopurinol (ZYLOPRIM) 300 MG tablet, , Disp: , Rfl: 3  •  amLODIPine (NORVASC) 5 MG tablet, , Disp: , Rfl: 5  •  atenolol (TENORMIN) 50 MG tablet, , Disp: , Rfl: 3  •  chlorhexidine (PERIDEX) 0.12 % solution, Apply 15 mL to the mouth or throat 2 (Two) Times a Day., Disp: , Rfl:   •  meloxicam (MOBIC) 7.5 MG tablet, , Disp: , Rfl:   •  Multiple Vitamins-Minerals (MULTIVITAMIN ADULT PO), Take  by mouth., Disp: , Rfl:   •  tamsulosin (FLOMAX) 0.4 MG capsule 24 hr capsule, , Disp: , Rfl: 5    Past Medical History:   Diagnosis Date   • Arthritis     gout   • BPH with urinary obstruction    • Calculus of kidney    • Calculus of ureter    • Hearing loss    • Hypertension    • Microscopic hematuria    • Nocturia        Past Surgical History:   Procedure Laterality Date   • CARPAL TUNNEL RELEASE     • EXTRACORPOREAL SHOCK WAVE LITHOTRIPSY (ESWL) Left 3/1/2017    Procedure: LEFT EXTRACORPOREAL SHOCKWAVE LITHOTRIPSY;  Surgeon: Emanuel Franco MD;  Location: [facility] OR;  Service:    • EXTRACORPOREAL SHOCKWAVE LITHOTRIPSY (ESWL), STENT  "INSERTION/REMOVAL     • HEMORRHOIDECTOMY     • KNEE SURGERY         Social History     Social History   • Marital status:      Spouse name: N/A   • Number of children: N/A   • Years of education: N/A     Social History Main Topics   • Smoking status: Never Smoker   • Smokeless tobacco: None   • Alcohol use No   • Drug use: No   • Sexual activity: Not Asked     Other Topics Concern   • None     Social History Narrative       Family History   Problem Relation Age of Onset   • No Known Problems Father    • No Known Problems Mother        Objective    /68  Temp 97 °F (36.1 °C)  Ht 74\" (188 cm)  Wt 267 lb (121 kg)  BMI 34.28 kg/m2    Physical Exam      Results Encounter on 02/27/2017   Component Date Value Ref Range Status   • PTT 02/24/2017 34.2  24.1 - 34.8 seconds Final   • Protime 02/24/2017 14.1  11.9 - 14.6 Seconds Final   • INR 02/24/2017 1.06  0.91 - 1.09 Final       Results for orders placed or performed in visit on 03/31/17   POC Urinalysis Dipstick, Automated   Result Value Ref Range    Color Yellow Yellow, Straw, Dark Yellow, Adrianne    Clarity, UA Clear Clear    Glucose, UA Negative Negative, 1000 mg/dL (3+) mg/dL    Bilirubin Negative Negative    Ketones, UA Trace (A) Negative    Specific Gravity  1.030 1.005 - 1.030    Blood, UA Trace (A) Negative    pH, Urine 6.5 5.0 - 8.0    Protein, POC Negative Negative mg/dL    Urobilinogen, UA Normal Normal    Leukocytes Negative Negative    Nitrite, UA Negative Negative       Assessment and Plan    Diagnoses and all orders for this visit:    Calculus of kidney  -     POC Urinalysis Dipstick, Automated  -     US Renal Bilateral; Future  -     XR Abdomen KUB; Future    Ureteral stone  -     POC Urinalysis Dipstick, Automated    Hydronephrosis with renal and ureteral calculus obstruction   pan-- I discussed the options withth paint at lengthagi h remains asymptomatic h is going to continue taking his Flomax on was seen back in a month with a ultrasound " and a LIAM research having severe pain fever etc. He will call prior to that    EMR Dragon/Transcription disclaimer:  Much of this encounter note is an electronic transcription/translation of spoken language to printed text. The electronic translation of spoken language may permit erroneous, or at times, nonsensical words or phrases to be inadvertently transcribed; although I have reviewed the note for such errors, some may still exist.

## 2017-05-01 ENCOUNTER — OFFICE VISIT (OUTPATIENT)
Dept: UROLOGY | Facility: CLINIC | Age: 67
End: 2017-05-01

## 2017-05-01 ENCOUNTER — HOSPITAL ENCOUNTER (OUTPATIENT)
Dept: ULTRASOUND IMAGING | Facility: HOSPITAL | Age: 67
Discharge: HOME | End: 2017-05-01
Attending: UROLOGY | Admitting: UROLOGY

## 2017-05-01 ENCOUNTER — HOSPITAL ENCOUNTER (OUTPATIENT)
Dept: GENERAL RADIOLOGY | Facility: HOSPITAL | Age: 67
Discharge: HOME | End: 2017-05-01

## 2017-05-01 VITALS
HEIGHT: 74 IN | WEIGHT: 270 LBS | SYSTOLIC BLOOD PRESSURE: 160 MMHG | BODY MASS INDEX: 34.65 KG/M2 | TEMPERATURE: 98.6 F | DIASTOLIC BLOOD PRESSURE: 80 MMHG

## 2017-05-01 DIAGNOSIS — N20.0 CALCULUS OF KIDNEY: ICD-10-CM

## 2017-05-01 DIAGNOSIS — N20.1 URETERAL STONE: ICD-10-CM

## 2017-05-01 DIAGNOSIS — N20.0 CALCULUS OF KIDNEY: Primary | ICD-10-CM

## 2017-05-01 DIAGNOSIS — N13.2 HYDRONEPHROSIS WITH RENAL AND URETERAL CALCULUS OBSTRUCTION: ICD-10-CM

## 2017-05-01 LAB
BILIRUB BLD-MCNC: NEGATIVE MG/DL
CLARITY, POC: CLEAR
COLOR UR: YELLOW
GLUCOSE UR STRIP-MCNC: NEGATIVE MG/DL
KETONES UR QL: NEGATIVE
LEUKOCYTE EST, POC: NEGATIVE
NITRITE UR-MCNC: NEGATIVE MG/ML
PH UR: 6 [PH] (ref 5–8)
PROT UR STRIP-MCNC: NEGATIVE MG/DL
RBC # UR STRIP: ABNORMAL /UL
SP GR UR: 1.02 (ref 1–1.03)
UROBILINOGEN UR QL: ABNORMAL

## 2017-05-01 PROCEDURE — 74000 HC ABDOMEN KUB: CPT

## 2017-05-01 PROCEDURE — 99024 POSTOP FOLLOW-UP VISIT: CPT | Performed by: UROLOGY

## 2017-05-01 PROCEDURE — 81003 URINALYSIS AUTO W/O SCOPE: CPT | Performed by: UROLOGY

## 2017-05-01 PROCEDURE — 76775 US EXAM ABDO BACK WALL LIM: CPT

## 2017-06-01 ENCOUNTER — OFFICE VISIT (OUTPATIENT)
Dept: UROLOGY | Facility: CLINIC | Age: 67
End: 2017-06-01

## 2017-06-01 ENCOUNTER — HOSPITAL ENCOUNTER (OUTPATIENT)
Dept: GENERAL RADIOLOGY | Facility: HOSPITAL | Age: 67
Discharge: HOME | End: 2017-06-01
Attending: UROLOGY | Admitting: UROLOGY

## 2017-06-01 VITALS
WEIGHT: 263 LBS | HEIGHT: 74 IN | DIASTOLIC BLOOD PRESSURE: 78 MMHG | SYSTOLIC BLOOD PRESSURE: 150 MMHG | TEMPERATURE: 97.1 F | BODY MASS INDEX: 33.75 KG/M2

## 2017-06-01 DIAGNOSIS — N40.1 BPH (BENIGN PROSTATIC HYPERTROPHY) WITH URINARY OBSTRUCTION: ICD-10-CM

## 2017-06-01 DIAGNOSIS — N20.1 URETERAL STONE: ICD-10-CM

## 2017-06-01 DIAGNOSIS — N13.8 BPH (BENIGN PROSTATIC HYPERTROPHY) WITH URINARY OBSTRUCTION: ICD-10-CM

## 2017-06-01 DIAGNOSIS — N13.2 HYDRONEPHROSIS WITH RENAL AND URETERAL CALCULUS OBSTRUCTION: Primary | ICD-10-CM

## 2017-06-01 DIAGNOSIS — N20.0 CALCULUS OF KIDNEY: ICD-10-CM

## 2017-06-01 LAB
BILIRUB BLD-MCNC: ABNORMAL MG/DL
CLARITY, POC: CLEAR
COLOR UR: YELLOW
GLUCOSE UR STRIP-MCNC: NEGATIVE MG/DL
KETONES UR QL: ABNORMAL
LEUKOCYTE EST, POC: ABNORMAL
NITRITE UR-MCNC: NEGATIVE MG/ML
PH UR: 5.5 [PH] (ref 5–8)
PROT UR STRIP-MCNC: ABNORMAL MG/DL
RBC # UR STRIP: NEGATIVE /UL
SP GR UR: 1.03 (ref 1–1.03)
UROBILINOGEN UR QL: ABNORMAL

## 2017-06-01 PROCEDURE — 99214 OFFICE O/P EST MOD 30 MIN: CPT | Performed by: UROLOGY

## 2017-06-01 PROCEDURE — 74000 HC ABDOMEN KUB: CPT

## 2017-06-01 PROCEDURE — 81003 URINALYSIS AUTO W/O SCOPE: CPT | Performed by: UROLOGY

## 2017-06-01 NOTE — PROGRESS NOTES
Subjective    Mr. Durán is 67 y.o. male    CHIEF COMPLAINT: Ureteral stone    The patient comes back today for follow-up of renal and ureteral stones he is moving minimally symptomatic he has not had any gross hematuria is had no flank pain no burning stinging etc.    Unfortunately his KUB shows a fragment that is unchanged from previous position also 2 smaller renal calculi    KUB independent review    A KUB is available for me to review today.  The image is inspected for a bowel gas pattern and the general bone structure of the spine and pelvis. The kidneys are then inspected closely.  Renal outline is noted if identifiable. The kidney, collecting system, and anticipated path of the ureter are examined for calcifications including those in the true pelvis.  This film reveals:    On the right there are no calcificaitons seen in the kidney or the expected course of the ureter. .    On the left there are multiple renal and proximal ureteral stones. 7mm.      History of Present Illness      The following portions of the patient's history were reviewed and updated as appropriate: allergies, current medications, past family history, past medical history, past social history, past surgical history and problem list.    Review of Systems   Constitutional: Negative.  Negative for chills and fever.   Gastrointestinal: Negative for abdominal distention, abdominal pain, anal bleeding, blood in stool and nausea.   Genitourinary: Positive for urgency. Negative for difficulty urinating, dysuria, flank pain, frequency and hematuria.   Psychiatric/Behavioral: Negative.  Negative for agitation and confusion.         Current Outpatient Prescriptions:   •  allopurinol (ZYLOPRIM) 300 MG tablet, , Disp: , Rfl: 3  •  amLODIPine (NORVASC) 5 MG tablet, , Disp: , Rfl: 5  •  atenolol (TENORMIN) 50 MG tablet, , Disp: , Rfl: 3  •  chlorhexidine (PERIDEX) 0.12 % solution, Apply 15 mL to the mouth or throat 2 (Two) Times a Day., Disp: , Rfl:  "  •  meloxicam (MOBIC) 7.5 MG tablet, , Disp: , Rfl:   •  Multiple Vitamins-Minerals (MULTIVITAMIN ADULT PO), Take  by mouth., Disp: , Rfl:   •  tamsulosin (FLOMAX) 0.4 MG capsule 24 hr capsule, , Disp: , Rfl: 5    Past Medical History:   Diagnosis Date   • Arthritis     gout   • BPH with urinary obstruction    • Calculus of kidney    • Calculus of ureter    • Hearing loss    • Hypertension    • Microscopic hematuria    • Nocturia        Past Surgical History:   Procedure Laterality Date   • CARPAL TUNNEL RELEASE     • EXTRACORPOREAL SHOCK WAVE LITHOTRIPSY (ESWL) Left 3/1/2017    Procedure: LEFT EXTRACORPOREAL SHOCKWAVE LITHOTRIPSY;  Surgeon: Emanuel Franco MD;  Location: [facility] OR;  Service:    • EXTRACORPOREAL SHOCKWAVE LITHOTRIPSY (ESWL), STENT INSERTION/REMOVAL     • HEMORRHOIDECTOMY     • KNEE SURGERY         Social History     Social History   • Marital status:      Spouse name: N/A   • Number of children: N/A   • Years of education: N/A     Social History Main Topics   • Smoking status: Never Smoker   • Smokeless tobacco: None   • Alcohol use No   • Drug use: No   • Sexual activity: Not Asked     Other Topics Concern   • None     Social History Narrative       Family History   Problem Relation Age of Onset   • No Known Problems Father    • No Known Problems Mother        Objective    /78  Temp 97.1 °F (36.2 °C)  Ht 74\" (188 cm)  Wt 263 lb (119 kg)  BMI 33.77 kg/m2    Physical Exam      Office Visit on 05/01/2017   Component Date Value Ref Range Status   • Color 05/01/2017 Yellow  Yellow, Straw, Dark Yellow, Adrianne Final   • Clarity, UA 05/01/2017 Clear  Clear Final   • Glucose, UA 05/01/2017 Negative  Negative, 1000 mg/dL (3+) mg/dL Final   • Bilirubin 05/01/2017 Negative  Negative Final   • Ketones, UA 05/01/2017 Negative  Negative Final   • Specific Gravity  05/01/2017 1.025  1.005 - 1.030 Final   • Blood, UA 05/01/2017 Small* Negative Final   • pH, Urine 05/01/2017 6.0  5.0 - 8.0 Final "   • Protein, POC 05/01/2017 Negative  Negative mg/dL Final   • Urobilinogen, UA 05/01/2017 1 E.U./dL * Normal Final   • Leukocytes 05/01/2017 Negative  Negative Final   • Nitrite, UA 05/01/2017 Negative  Negative Final       Results for orders placed or performed in visit on 06/01/17   POC Urinalysis Dipstick, Automated   Result Value Ref Range    Color Yellow Yellow, Straw, Dark Yellow, Adrianne    Clarity, UA Clear Clear    Glucose, UA Negative Negative, 1000 mg/dL (3+) mg/dL    Bilirubin Small (1+) (A) Negative    Ketones, UA Trace (A) Negative    Specific Gravity  1.030 1.005 - 1.030    Blood, UA Negative Negative    pH, Urine 5.5 5.0 - 8.0    Protein, POC 30 mg/dL (A) Negative mg/dL    Urobilinogen, UA 1 E.U./dL  (A) Normal    Leukocytes Trace (A) Negative    Nitrite, UA Negative Negative       Assessment and Plan    Martin was seen today for hydronephrosis with renal and ureteral calculus obstruction.    Diagnoses and all orders for this visit:    Hydronephrosis with renal and ureteral calculus obstruction  -     POC Urinalysis Dipstick, Automated    Calculus of kidney    Ureteral stone    BPH (benign prostatic hypertrophy) with urinary obstruction    Plan--I discussed the options with the patient regarding management spell little over 3 months now since he has had his previous ESWL and this fragment really has not moved suggested either a repeat ESWL or a ureteroscopy and discussed both with him he would like to go ahead and do ESWL we will schedule that again near future I told with this one does not work and obviously well then certainly would not do of third but we do ureteroscopy but hopefully this will take care of of answer all his questions    The patient has Left stone(s) that measure(s)  7 mm. The stones is/are located at the ureteropelvic junction without hydronephrosis.  The films, including size, location, density of stones and symptoms are discussed with the patient including how this influences the  treatment option recommended.  Options discussed with  the patient included ureteroscopic, open, and percutaneous approaches.  However the patient has chosen ESWL therapy due to its less invasive approach combined with its effectiveness.  Risks discussed include damage to the kidney, even long term, inability to render stone free with need for extra procedures, bleeding that may even result in need for blood transfusion (<1%) , [place], and possible arrhythmia are explained.  The possible need for administration of intravenous contrast or placement of a ureteral localization catheter done by cystoscopy is also discussed.  The patient does understand the spontaneous passage of stones are an option but this is dependent typically on the size and location of the stone.  EMR Dragon/Transcription disclaimer:  Much of this encounter note is an electronic transcription/translation of spoken language to printed text. The electronic translation of spoken language may permit erroneous, or at times, nonsensical words or phrases to be inadvertently transcribed; although I have reviewed the note for such errors, some may still exist.     EMR Dragon/Transcription disclaimer:  Much of this encounter note is an electronic transcription/translation of spoken language to printed text. The electronic translation of spoken language may permit erroneous, or at times, nonsensical words or phrases to be inadvertently transcribed; although I have reviewed the note for such errors, some may still exist.

## 2017-06-06 ENCOUNTER — RESULTS ENCOUNTER (OUTPATIENT)
Dept: UROLOGY | Facility: CLINIC | Age: 67
End: 2017-06-06

## 2017-06-06 DIAGNOSIS — N20.1 URETERAL STONE: ICD-10-CM

## 2017-06-07 ENCOUNTER — APPOINTMENT (OUTPATIENT)
Dept: PREADMISSION TESTING | Facility: HOSPITAL | Age: 67
End: 2017-06-07

## 2017-06-07 VITALS
RESPIRATION RATE: 18 BRPM | HEART RATE: 68 BPM | HEIGHT: 74 IN | OXYGEN SATURATION: 99 % | DIASTOLIC BLOOD PRESSURE: 80 MMHG | WEIGHT: 262.13 LBS | SYSTOLIC BLOOD PRESSURE: 188 MMHG | BODY MASS INDEX: 33.64 KG/M2

## 2017-06-07 LAB
ANION GAP SERPL CALCULATED.3IONS-SCNC: 11 MMOL/L (ref 4–13)
APTT PPP: 33.4 SECONDS (ref 24.1–34.8)
BACTERIA UR QL AUTO: ABNORMAL /HPF
BILIRUB UR QL STRIP: NEGATIVE
BUN BLD-MCNC: 23 MG/DL (ref 5–21)
BUN/CREAT SERPL: 21.9 (ref 7–25)
CALCIUM SPEC-SCNC: 9.7 MG/DL (ref 8.4–10.4)
CHLORIDE SERPL-SCNC: 103 MMOL/L (ref 98–110)
CLARITY UR: CLEAR
CO2 SERPL-SCNC: 28 MMOL/L (ref 24–31)
COLOR UR: YELLOW
CREAT BLD-MCNC: 1.05 MG/DL (ref 0.5–1.4)
DEPRECATED RDW RBC AUTO: 45.7 FL (ref 40–54)
ERYTHROCYTE [DISTWIDTH] IN BLOOD BY AUTOMATED COUNT: 13.8 % (ref 12–15)
GFR SERPL CREATININE-BSD FRML MDRD: 86 ML/MIN/1.73
GLUCOSE BLD-MCNC: 111 MG/DL (ref 70–100)
GLUCOSE UR STRIP-MCNC: NEGATIVE MG/DL
HCT VFR BLD AUTO: 39.5 % (ref 40–52)
HGB BLD-MCNC: 13.3 G/DL (ref 14–18)
HGB UR QL STRIP.AUTO: ABNORMAL
HYALINE CASTS UR QL AUTO: ABNORMAL /LPF
INR PPP: 1.04 (ref 0.91–1.09)
KETONES UR QL STRIP: NEGATIVE
LEUKOCYTE ESTERASE UR QL STRIP.AUTO: ABNORMAL
MCH RBC QN AUTO: 30.4 PG (ref 28–32)
MCHC RBC AUTO-ENTMCNC: 33.7 G/DL (ref 33–36)
MCV RBC AUTO: 90.2 FL (ref 82–95)
NITRITE UR QL STRIP: NEGATIVE
PH UR STRIP.AUTO: 6.5 [PH] (ref 5–8)
PLATELET # BLD AUTO: 126 10*3/MM3 (ref 130–400)
PMV BLD AUTO: 10.4 FL (ref 6–12)
POTASSIUM BLD-SCNC: 4.2 MMOL/L (ref 3.5–5.3)
PROT UR QL STRIP: NEGATIVE
PROTHROMBIN TIME: 13.9 SECONDS (ref 11.9–14.6)
RBC # BLD AUTO: 4.38 10*6/MM3 (ref 4.8–5.9)
RBC # UR: ABNORMAL /HPF
REF LAB TEST METHOD: ABNORMAL
SODIUM BLD-SCNC: 142 MMOL/L (ref 135–145)
SP GR UR STRIP: 1.02 (ref 1–1.03)
SQUAMOUS #/AREA URNS HPF: ABNORMAL /HPF
UROBILINOGEN UR QL STRIP: ABNORMAL
WBC NRBC COR # BLD: 4.55 10*3/MM3 (ref 4.8–10.8)
WBC UR QL AUTO: ABNORMAL /HPF

## 2017-06-07 PROCEDURE — 85730 THROMBOPLASTIN TIME PARTIAL: CPT | Performed by: UROLOGY

## 2017-06-07 PROCEDURE — 81001 URINALYSIS AUTO W/SCOPE: CPT | Performed by: UROLOGY

## 2017-06-07 PROCEDURE — 85027 COMPLETE CBC AUTOMATED: CPT | Performed by: UROLOGY

## 2017-06-07 PROCEDURE — 93010 ELECTROCARDIOGRAM REPORT: CPT | Performed by: INTERNAL MEDICINE

## 2017-06-07 PROCEDURE — 85610 PROTHROMBIN TIME: CPT | Performed by: UROLOGY

## 2017-06-07 PROCEDURE — 93005 ELECTROCARDIOGRAM TRACING: CPT

## 2017-06-07 PROCEDURE — 36415 COLL VENOUS BLD VENIPUNCTURE: CPT | Performed by: UROLOGY

## 2017-06-07 PROCEDURE — 80048 BASIC METABOLIC PNL TOTAL CA: CPT | Performed by: UROLOGY

## 2017-06-07 NOTE — DISCHARGE INSTRUCTIONS
DAY OF SURGERY INSTRUCTIONS        YOUR SURGEON: Dr. Franco    PROCEDURE: ESWL lithotripsy    DATE OF SURGERY: June 14, 2017    ARRIVAL TIME: AS DIRECTED BY OFFICE    DAY OF SURGERY TAKE ONLY THESE MEDICATIONS: Atenolol, Norvasc            BEFORE YOU COME TO THE HOSPITAL  (Pre-op instructions)  • Do not eat, drink, smoke or chew gum after midnight the night before surgery.  This also includes no mints.  • Morning of surgery take only the medicines you have been instructed with a sip of water unless otherwise instructed  by your physician.  • Do not shave, wear makeup or dark nail polish.  • Remove all jewelry including rings.  • Leave anything you consider valuable at home.  • Leave your suitcase in the car until after your surgery.  • Bring the following with you if applicable:  o Picture ID and insurance, Medicare or Medicaid cards  o Co-pay/deductible required by insurance (cash, check, credit card)  o Copy of advance directive, living will or power-of- documents if not brought to PAT  o CPAP or BIPAP mask and tubing  o Relaxation aids (MP3 player, book, magazine)  • Confirm your arrival time with you surgeon the day before your surgery (surgery times are subject to change)  • On the day of surgery check in at registration located at the main entrance of the hospital.       Outpatient Surgery Guidelines, Adult  Outpatient procedures are those for which the person having the procedure is allowed to go home the same day as the procedure. Various procedures are done on an outpatient basis. You should follow some general guidelines if you will be having an outpatient procedure.  LET YOUR HEALTH CARE PROVIDER KNOW ABOUT:  · Any allergies you have.  · All medicines you are taking, including vitamins, herbs, eye drops, creams, and over-the-counter medicines.  · Previous problems you or members of your family have had with the use of anesthetics.  · Any blood disorders you have.  · Previous surgeries you have  had.  · Medical conditions you have.  RISKS AND COMPLICATIONS  Your health care provider will discuss possible risks and complications with you before surgery. Common risks and complications include:    · Problems due to the use of anesthetics.  · Blood loss and replacement (does not apply to minor surgical procedures).  · Temporary increase in pain due to surgery.  · Uncorrected pain or problems that the surgery was meant to correct.  · Infection.  · New damage.  BEFORE THE PROCEDURE  · Ask your health care provider about changing or stopping your regular medicines. You may need to stop taking certain medicines in the days or weeks before the procedure.  · Stop smoking at least 2 weeks before surgery. This lowers your risk for complications during and after surgery. Ask your health care provider for help with this if needed.  · Eat your usual meals and a light supper the day before surgery. Continue fluid intake. Do not drink alcohol.  · Do not eat or drink after midnight the night before your surgery.   · Arrange for someone to take you home and to stay with you for 24 hours after the procedure. Medicine given for your procedure may affect your ability to drive or to care for yourself.  · Call your health care provider's office if you develop an illness or problem that may prevent you from safely having your procedure.  AFTER THE PROCEDURE  After surgery, you will be taken to a recovery area, where your progress will be monitored. If there are no complications, you will be allowed to go home when you are awake, stable, and taking fluids well. You may have numbness around the surgical site. Healing will take some time. You will have tenderness at the surgical site and may have some swelling and bruising. You may also have some nausea.  HOME CARE INSTRUCTIONS  · Do not drive for 24 hours, or as directed by your health care provider. Do not drive while taking prescription pain medicines.  · Do not drink alcohol for  24 hours.  · Do not make important decisions or sign legal documents for 24 hours.  · You may resume a normal diet and activities as directed.  · Do not lift anything heavier than 10 pounds (4.5 kg) or play contact sports until your health care provider says it is okay.  · Change your bandages (dressings) as directed.  · Only take over-the-counter or prescription medicines as directed by your health care provider.  · Follow up with your health care provider as directed.  SEEK MEDICAL CARE IF:  · You have increased bleeding (more than a small spot) from the surgical site.  · You have redness, swelling, or increasing pain in the wound.  · You see pus coming from the wound.  · You have a fever.  · You notice a bad smell coming from the wound or dressing.  · You feel lightheaded or faint.  · You develop a rash.  · You have trouble breathing.  · You develop allergies.  MAKE SURE YOU:  · Understand these instructions.  · Will watch your condition.  · Will get help right away if you are not doing well or get worse.     This information is not intended to replace advice given to you by your health care provider. Make sure you discuss any questions you have with your health care provider.     Document Released: 09/12/2002 Document Revised: 05/03/2016 Document Reviewed: 05/22/2014  [employer] Interactive Patient Education ©2016 [employer]       Fall Prevention in Hospitals, Adult  As a hospital patient, your condition and the treatments you receive can increase your risk for falls. Some additional risk factors for falls in a hospital include:  · Being in an unfamiliar environment.  · Being on bed rest.  · Your surgery.  · Taking certain medicines.  · Your tubing requirements, such as intravenous (IV) therapy or catheters.  It is important that you learn how to decrease fall risks while at the hospital. Below are important tips that can help prevent falls.  SAFETY TIPS FOR PREVENTING FALLS  Talk about your risk of  falling.  · Ask your health care provider why you are at risk for falling. Is it your medicine, illness, tubing placement, or something else?  · Make a plan with your health care provider to keep you safe from falls.  · Ask your health care provider or pharmacist about side effects of your medicines. Some medicines can make you dizzy or affect your coordination.  Ask for help.  · Ask for help before getting out of bed. You may need to press your call button.  · Ask for assistance in getting safely to the toilet.  · Ask for a walker or cane to be put at your bedside. Ask that most of the side rails on your bed be placed up before your health care provider leaves the room.  · Ask family or friends to sit with you.  · Ask for things that are out of your reach, such as your glasses, hearing aids, telephone, bedside table, or call button.  Follow these tips to avoid falling:  · Stay lying or seated, rather than standing, while waiting for help.  · Wear rubber-soled slippers or shoes whenever you walk in the hospital.  · Avoid quick, sudden movements.  ¨ Change positions slowly.  ¨ Sit on the side of your bed before standing.  ¨ Stand up slowly and wait before you start to walk.  · Let your health care provider know if there is a spill on the floor.  · Pay careful attention to the medical equipment, electrical cords, and tubes around you.  · When you need help, use your call button by your bed or in the bathroom. Wait for one of your health care providers to help you.  · If you feel dizzy or unsure of your footing, return to bed and wait for assistance.  · Avoid being distracted by the TV, telephone, or another person in your room.  · Do not lean or support yourself on rolling objects, such as IV poles or bedside tables.     This information is not intended to replace advice given to you by your health care provider. Make sure you discuss any questions you have with your health care provider.     Document Released:  12/15/2001 Document Revised: 01/08/2016 Document Reviewed: 08/25/2013  [employer] Interactive Patient Education ©2016 [employer]       Surgical Site Infections FAQs  What is a Surgical Site Infection (SSI)?  A surgical site infection is an infection that occurs after surgery in the part of the body where the surgery took place. Most patients who have surgery do not develop an infection. However, infections develop in about 1 to 3 out of every 100 patients who have surgery.  Some of the common symptoms of a surgical site infection are:  · Redness and pain around the area where you had surgery  · Drainage of cloudy fluid from your surgical wound  · Fever  Can SSIs be treated?  Yes. Most surgical site infections can be treated with antibiotics. The antibiotic given to you depends on the bacteria (germs) causing the infection. Sometimes patients with SSIs also need another surgery to treat the infection.  What are some of the things that hospitals are doing to prevent SSIs?  To prevent SSIs, doctors, nurses, and other healthcare providers:  · Clean their hands and arms up to their elbows with an antiseptic agent just before the surgery.  · Clean their hands with soap and water or an alcohol-based hand rub before and after caring for each patient.  · May remove some of your hair immediately before your surgery using electric clippers if the hair is in the same area where the procedure will occur. They should not shave you with a razor.  · Wear special hair covers, masks, gowns, and gloves during surgery to keep the surgery area clean.  · Give you antibiotics before your surgery starts. In most cases, you should get antibiotics within 60 minutes before the surgery starts and the antibiotics should be stopped within 24 hours after surgery.  · Clean the skin at the site of your surgery with a special soap that kills germs.  What can I do to help prevent SSIs?  Before your surgery:  · Tell your doctor about other medical  problems you may have. Health problems such as allergies, diabetes, and obesity could affect your surgery and your treatment.  · Quit smoking. Patients who smoke get more infections. Talk to your doctor about how you can quit before your surgery.  · Do not shave near where you will have surgery. Shaving with a razor can irritate your skin and make it easier to develop an infection.  At the time of your surgery:  · Speak up if someone tries to shave you with a razor before surgery. Ask why you need to be shaved and talk with your surgeon if you have any concerns.  · Ask if you will get antibiotics before surgery.  After your surgery:  · Make sure that your healthcare providers clean their hands before examining you, either with soap and water or an alcohol-based hand rub.  · If you do not see your providers clean their hands, please ask them to do so.  · Family and friends who visit you should not touch the surgical wound or dressings.  · Family and friends should clean their hands with soap and water or an alcohol-based hand rub before and after visiting you. If you do not see them clean their hands, ask them to clean their hands.  What do I need to do when I go home from the hospital?  · Before you go home, your doctor or nurse should explain everything you need to know about taking care of your wound. Make sure you understand how to care for your wound before you leave the hospital.  · Always clean your hands before and after caring for your wound.  · Before you go home, make sure you know who to contact if you have questions or problems after you get home.  · If you have any symptoms of an infection, such as redness and pain at the surgery site, drainage, or fever, call your doctor immediately.  If you have additional questions, please ask your doctor or nurse.  Developed and co-sponsored by The Society for Healthcare Epidemiology of [place] (SHEA); Infectious Diseases Society of [place] (IDSA); [facility]  Association; Association for Professionals in Infection Control and Epidemiology (APIC); Centers for Disease Control and Prevention (CDC); and The Joint Commission.     This information is not intended to replace advice given to you by your health care provider. Make sure you discuss any questions you have with your health care provider.     Document Released: 12/23/2014 Document Revised: 01/08/2016 Document Reviewed: 03/02/2016  [employer] Interactive Patient Education ©2016 [employer]     PATIENT/FAMILY/RESPONSIBLE PARTY VERBALIZES UNDERSTANDING OF ABOVE EDUCATION.  COPY OF PAIN SCALE GIVEN AND REVIEWED WITH VERBALIZED UNDERSTANDING.

## 2017-06-14 ENCOUNTER — APPOINTMENT (OUTPATIENT)
Dept: GENERAL RADIOLOGY | Facility: HOSPITAL | Age: 67
End: 2017-06-14

## 2017-06-14 ENCOUNTER — ANESTHESIA (OUTPATIENT)
Dept: PERIOP | Facility: HOSPITAL | Age: 67
End: 2017-06-14

## 2017-06-14 ENCOUNTER — HOSPITAL ENCOUNTER (OUTPATIENT)
Facility: HOSPITAL | Age: 67
Discharge: HOME | End: 2017-06-14
Attending: UROLOGY | Admitting: UROLOGY

## 2017-06-14 ENCOUNTER — ANESTHESIA EVENT (OUTPATIENT)
Dept: PERIOP | Facility: HOSPITAL | Age: 67
End: 2017-06-14

## 2017-06-14 VITALS
OXYGEN SATURATION: 98 % | TEMPERATURE: 97.2 F | HEART RATE: 68 BPM | RESPIRATION RATE: 16 BRPM | SYSTOLIC BLOOD PRESSURE: 156 MMHG | DIASTOLIC BLOOD PRESSURE: 72 MMHG

## 2017-06-14 DIAGNOSIS — N20.1 URETERAL STONE: ICD-10-CM

## 2017-06-14 PROCEDURE — 74000 HC ABDOMEN KUB: CPT

## 2017-06-14 PROCEDURE — 25010000002 ONDANSETRON PER 1 MG: Performed by: NURSE ANESTHETIST, CERTIFIED REGISTERED

## 2017-06-14 PROCEDURE — 25010000002 FENTANYL CITRATE (PF) 100 MCG/2ML SOLUTION: Performed by: NURSE ANESTHETIST, CERTIFIED REGISTERED

## 2017-06-14 PROCEDURE — 50590 FRAGMENTING OF KIDNEY STONE: CPT | Performed by: UROLOGY

## 2017-06-14 PROCEDURE — 25010000002 SUCCINYLCHOLINE PER 20 MG: Performed by: NURSE ANESTHETIST, CERTIFIED REGISTERED

## 2017-06-14 PROCEDURE — 25010000002 PROPOFOL 10 MG/ML EMULSION: Performed by: NURSE ANESTHETIST, CERTIFIED REGISTERED

## 2017-06-14 PROCEDURE — 25010000003 CEFAZOLIN PER 500 MG: Performed by: UROLOGY

## 2017-06-14 RX ORDER — PROPOFOL 10 MG/ML
VIAL (ML) INTRAVENOUS AS NEEDED
Status: DISCONTINUED | OUTPATIENT
Start: 2017-06-14 | End: 2017-06-14 | Stop reason: SURG

## 2017-06-14 RX ORDER — ONDANSETRON 2 MG/ML
INJECTION INTRAMUSCULAR; INTRAVENOUS AS NEEDED
Status: DISCONTINUED | OUTPATIENT
Start: 2017-06-14 | End: 2017-06-14 | Stop reason: SURG

## 2017-06-14 RX ORDER — PHENYLEPHRINE HCL IN 0.9% NACL 0.8MG/10ML
SYRINGE (ML) INTRAVENOUS AS NEEDED
Status: DISCONTINUED | OUTPATIENT
Start: 2017-06-14 | End: 2017-06-14 | Stop reason: SURG

## 2017-06-14 RX ORDER — DEXTROSE MONOHYDRATE 25 G/50ML
12.5 INJECTION, SOLUTION INTRAVENOUS AS NEEDED
Status: DISCONTINUED | OUTPATIENT
Start: 2017-06-14 | End: 2017-06-14 | Stop reason: HOSPADM

## 2017-06-14 RX ORDER — MORPHINE SULFATE 2 MG/ML
2 INJECTION, SOLUTION INTRAMUSCULAR; INTRAVENOUS
Status: DISCONTINUED | OUTPATIENT
Start: 2017-06-14 | End: 2017-06-14 | Stop reason: HOSPADM

## 2017-06-14 RX ORDER — MIDAZOLAM HYDROCHLORIDE 1 MG/ML
2 INJECTION INTRAMUSCULAR; INTRAVENOUS
Status: DISCONTINUED | OUTPATIENT
Start: 2017-06-14 | End: 2017-06-14 | Stop reason: HOSPADM

## 2017-06-14 RX ORDER — FENTANYL CITRATE 50 UG/ML
25 INJECTION, SOLUTION INTRAMUSCULAR; INTRAVENOUS
Status: DISCONTINUED | OUTPATIENT
Start: 2017-06-14 | End: 2017-06-14 | Stop reason: HOSPADM

## 2017-06-14 RX ORDER — FENTANYL CITRATE 50 UG/ML
INJECTION, SOLUTION INTRAMUSCULAR; INTRAVENOUS AS NEEDED
Status: DISCONTINUED | OUTPATIENT
Start: 2017-06-14 | End: 2017-06-14 | Stop reason: SURG

## 2017-06-14 RX ORDER — HYDRALAZINE HYDROCHLORIDE 20 MG/ML
5 INJECTION INTRAMUSCULAR; INTRAVENOUS
Status: DISCONTINUED | OUTPATIENT
Start: 2017-06-14 | End: 2017-06-14 | Stop reason: HOSPADM

## 2017-06-14 RX ORDER — MIDAZOLAM HYDROCHLORIDE 1 MG/ML
1 INJECTION INTRAMUSCULAR; INTRAVENOUS
Status: DISCONTINUED | OUTPATIENT
Start: 2017-06-14 | End: 2017-06-14 | Stop reason: HOSPADM

## 2017-06-14 RX ORDER — ONDANSETRON 2 MG/ML
4 INJECTION INTRAMUSCULAR; INTRAVENOUS ONCE AS NEEDED
Status: DISCONTINUED | OUTPATIENT
Start: 2017-06-14 | End: 2017-06-14 | Stop reason: HOSPADM

## 2017-06-14 RX ORDER — MAGNESIUM HYDROXIDE 1200 MG/15ML
LIQUID ORAL AS NEEDED
Status: DISCONTINUED | OUTPATIENT
Start: 2017-06-14 | End: 2017-06-14 | Stop reason: HOSPADM

## 2017-06-14 RX ORDER — LIDOCAINE HYDROCHLORIDE 20 MG/ML
INJECTION, SOLUTION INFILTRATION; PERINEURAL AS NEEDED
Status: DISCONTINUED | OUTPATIENT
Start: 2017-06-14 | End: 2017-06-14 | Stop reason: SURG

## 2017-06-14 RX ORDER — LABETALOL HYDROCHLORIDE 5 MG/ML
5 INJECTION, SOLUTION INTRAVENOUS
Status: DISCONTINUED | OUTPATIENT
Start: 2017-06-14 | End: 2017-06-14 | Stop reason: HOSPADM

## 2017-06-14 RX ORDER — HYDROCODONE BITARTRATE AND ACETAMINOPHEN 5; 325 MG/1; MG/1
2 TABLET ORAL EVERY 6 HOURS PRN
Qty: 15 TABLET | Refills: 0 | Status: SHIPPED | OUTPATIENT
Start: 2017-06-14 | End: 2017-10-10

## 2017-06-14 RX ORDER — SODIUM CHLORIDE 0.9 % (FLUSH) 0.9 %
1-10 SYRINGE (ML) INJECTION AS NEEDED
Status: DISCONTINUED | OUTPATIENT
Start: 2017-06-14 | End: 2017-06-14 | Stop reason: HOSPADM

## 2017-06-14 RX ORDER — ROCURONIUM BROMIDE 10 MG/ML
INJECTION, SOLUTION INTRAVENOUS AS NEEDED
Status: DISCONTINUED | OUTPATIENT
Start: 2017-06-14 | End: 2017-06-14 | Stop reason: SURG

## 2017-06-14 RX ORDER — SODIUM CHLORIDE, SODIUM LACTATE, POTASSIUM CHLORIDE, CALCIUM CHLORIDE 600; 310; 30; 20 MG/100ML; MG/100ML; MG/100ML; MG/100ML
9 INJECTION, SOLUTION INTRAVENOUS CONTINUOUS
Status: DISCONTINUED | OUTPATIENT
Start: 2017-06-14 | End: 2017-06-14 | Stop reason: HOSPADM

## 2017-06-14 RX ORDER — MEPERIDINE HYDROCHLORIDE 25 MG/ML
12.5 INJECTION INTRAMUSCULAR; INTRAVENOUS; SUBCUTANEOUS
Status: DISCONTINUED | OUTPATIENT
Start: 2017-06-14 | End: 2017-06-14 | Stop reason: HOSPADM

## 2017-06-14 RX ORDER — NALOXONE HCL 0.4 MG/ML
0.4 VIAL (ML) INJECTION AS NEEDED
Status: DISCONTINUED | OUTPATIENT
Start: 2017-06-14 | End: 2017-06-14 | Stop reason: HOSPADM

## 2017-06-14 RX ORDER — DIPHENHYDRAMINE HYDROCHLORIDE 50 MG/ML
12.5 INJECTION INTRAMUSCULAR; INTRAVENOUS
Status: DISCONTINUED | OUTPATIENT
Start: 2017-06-14 | End: 2017-06-14 | Stop reason: HOSPADM

## 2017-06-14 RX ORDER — IPRATROPIUM BROMIDE AND ALBUTEROL SULFATE 2.5; .5 MG/3ML; MG/3ML
3 SOLUTION RESPIRATORY (INHALATION) ONCE AS NEEDED
Status: DISCONTINUED | OUTPATIENT
Start: 2017-06-14 | End: 2017-06-14 | Stop reason: HOSPADM

## 2017-06-14 RX ORDER — SUCCINYLCHOLINE CHLORIDE 20 MG/ML
INJECTION INTRAMUSCULAR; INTRAVENOUS AS NEEDED
Status: DISCONTINUED | OUTPATIENT
Start: 2017-06-14 | End: 2017-06-14 | Stop reason: SURG

## 2017-06-14 RX ADMIN — Medication 160 MCG: at 13:35

## 2017-06-14 RX ADMIN — SODIUM CHLORIDE, POTASSIUM CHLORIDE, SODIUM LACTATE AND CALCIUM CHLORIDE 9 ML/HR: 600; 310; 30; 20 INJECTION, SOLUTION INTRAVENOUS at 12:10

## 2017-06-14 RX ADMIN — LIDOCAINE HYDROCHLORIDE 0.5 ML: 10 INJECTION, SOLUTION EPIDURAL; INFILTRATION; INTRACAUDAL; PERINEURAL at 12:09

## 2017-06-14 RX ADMIN — ROCURONIUM BROMIDE 10 MG: 10 INJECTION INTRAVENOUS at 13:24

## 2017-06-14 RX ADMIN — EPHEDRINE SULFATE 10 MG: 50 INJECTION INTRAMUSCULAR; INTRAVENOUS; SUBCUTANEOUS at 14:10

## 2017-06-14 RX ADMIN — FENTANYL CITRATE 100 MCG: 50 INJECTION, SOLUTION INTRAMUSCULAR; INTRAVENOUS at 13:24

## 2017-06-14 RX ADMIN — LIDOCAINE HYDROCHLORIDE 100 MG: 20 INJECTION, SOLUTION INFILTRATION; PERINEURAL at 13:24

## 2017-06-14 RX ADMIN — SUCCINYLCHOLINE CHLORIDE 100 MG: 20 INJECTION, SOLUTION INTRAMUSCULAR; INTRAVENOUS at 13:25

## 2017-06-14 RX ADMIN — PROPOFOL 150 MG: 10 INJECTION, EMULSION INTRAVENOUS at 13:24

## 2017-06-14 RX ADMIN — ONDANSETRON HYDROCHLORIDE 4 MG: 2 SOLUTION INTRAMUSCULAR; INTRAVENOUS at 14:31

## 2017-06-14 RX ADMIN — CEFAZOLIN SODIUM 2 G: 2 SOLUTION INTRAVENOUS at 13:29

## 2017-06-14 RX ADMIN — Medication 80 MCG: at 14:02

## 2017-06-14 NOTE — OP NOTE
OP NOTE  Martin Durán    Date of Procedure: 6/14/2017    Pre-op Diagnosis:   Ureteral stone [N20.1]    Post-op Diagnosis:     Post-Op Diagnosis Codes:     * Ureteral stone [N20.1]    Procedure/CPT® Codes:      Procedure(s):  EXTRACORPOREAL SHOCKWAVE LITHOTRIPSY    Surgeon(s):  Emanuel Franco MD    Anesthesia: General    Staff:   Circulator: Angelic Toussaint RN; Harper Graf RN  Scrub Person: Evie CASAS Stone  Vendor Representative: John Alvarez    Indications: Patient has  A stone  measuring approximately 7 mm in the left Proximal ureter.  After discussion of options, patient has given informed consent to undergo left ESWL.  All risks, benefits, and alternatives were discussed.    Operative Report: The patient is identified. The KUB is reviewed. After answering any questions, patient was brought to the operating room and placed on the patient table of the [facility].  General endotracheal anesthesia was administered.  Preoperative KUB was reviewed.   [place] [facility] [place] fluor[place] was used to identify the stone.    The shock-head is brought into postion.  This stone is brought into the F2 plane for focus.  Treatment was initiated.  We gradually increased the energy level from 1 to 9.  This stone was treated at a rate of 60.  We paused for 2 minutes after 300 shocks to reduce risk of renal damage.  The stone was periodically checked to make sure that we were on it and getting good breakup.  We checked at 700, 1000, 1500, 2000, and 2500 shocks.  The stone did have good breakup.  I felt it was unnecessary to place a ureteral stent.  At the conclusion of 4000 shocks, the patient was awakened from anesthesia and transferred to the recovery room in satisfactory condition.      We initially started the patient also had prior level of setting and rate of 90 however thousand and shocks we realized that the stone was really not breaking up to well so we increased the Linda 8 decrease the rate and appeared to be  breaking up better and then the last thousand shocks we increased the prior setting tonight under fluoroscopy one could still see some calculi there but look like it was PET spread out and fractured so hopefully we got a good treatment    Estimated Blood Loss:  0    Specimens:                * No specimens in log *      Drains:           Complications: none  Plan: Follow-up in 2 weeks with a LIAM Franco MD     Date: 6/14/2017  Time: 2:41 PM

## 2017-06-14 NOTE — PLAN OF CARE
Problem: Patient Care Overview (Adult)  Goal: Plan of Care Review  Outcome: Ongoing (interventions implemented as appropriate)    06/14/17 1200   Coping/Psychosocial Response Interventions   Plan Of Care Reviewed With patient   Patient Care Overview   Progress no change

## 2017-06-14 NOTE — PLAN OF CARE
Problem: Patient Care Overview (Adult)  Goal: Plan of Care Review  Outcome: Outcome(s) achieved Date Met:  06/14/17 06/14/17 4069   Coping/Psychosocial Response Interventions   Plan Of Care Reviewed With patient;spouse   Patient Care Overview   Progress improving   Outcome Evaluation   Outcome Summary/Follow up Plan PATIENT MEETS DISCHARGE CRITERIA         Problem: Perioperative Period (Adult)  Goal: Signs and Symptoms of Listed Potential Problems Will be Absent or Manageable (Perioperative Period)  Outcome: Outcome(s) achieved Date Met:  06/14/17

## 2017-06-14 NOTE — PLAN OF CARE
Problem: Patient Care Overview (Adult)  Goal: Plan of Care Review    06/14/17 1504   Coping/Psychosocial Response Interventions   Plan Of Care Reviewed With patient   Patient Care Overview   Progress improving   Outcome Evaluation   Outcome Summary/Follow up Plan MEETS PACU DC CRITERIA         Problem: Perioperative Period (Adult)  Goal: Signs and Symptoms of Listed Potential Problems Will be Absent or Manageable (Perioperative Period)  Outcome: Ongoing (interventions implemented as appropriate)

## 2017-06-14 NOTE — ANESTHESIA POSTPROCEDURE EVALUATION
Patient: Martin Durán    Procedure Summary     Date Anesthesia Start Anesthesia Stop Room / Location    06/14/17 1321 1447  [facility] OR 06 / [facility] OR       Procedure Diagnosis Surgeon Provider    EXTRACORPOREAL SHOCKWAVE LITHOTRIPSY (Left ) Ureteral stone  (Ureteral stone [N20.1]) MD Fito Cifuentes CRNA          Anesthesia Type: general  Last vitals  /72 (06/14/17 1550)    Temp      Pulse 68 (06/14/17 1550)   Resp 16 (06/14/17 1550)    SpO2 98 % (06/14/17 1550)      Post Anesthesia Care and Evaluation    Patient location during evaluation: PACU  Patient participation: complete - patient participated  Level of consciousness: awake and alert  Pain management: adequate  Airway patency: patent  Anesthetic complications: No anesthetic complications    Cardiovascular status: acceptable  Respiratory status: acceptable  Hydration status: acceptable

## 2017-06-14 NOTE — PLAN OF CARE
Problem: Perioperative Period (Adult)  Goal: Signs and Symptoms of Listed Potential Problems Will be Absent or Manageable (Perioperative Period)  Outcome: Ongoing (interventions implemented as appropriate)    06/14/17 1200   Perioperative Period   Problems Assessed (Perioperative Period) pain;perioperative injury;infection;situational response   Problems Present (Perioperative Period) situational response

## 2017-06-14 NOTE — DISCHARGE INSTRUCTIONS

## 2017-06-14 NOTE — ANESTHESIA PROCEDURE NOTES
Airway  Urgency: elective    Date/Time: 6/14/2017 1:26 PM  End Time:6/14/2017 1:26 PM  Airway not difficult    General Information and Staff    Patient location during procedure: OR  CRNA: RADHA TOUSSAINT    Indications and Patient Condition  Indications for airway management: airway protection    Preoxygenated: yes  MILS maintained throughout  Mask difficulty assessment: 2 - vent by mask + OA or adjuvant +/- NMBA    Final Airway Details  Final airway type: endotracheal airway      Successful airway: ETT  Cuffed: yes   Successful intubation technique: direct laryngoscopy  Facilitating devices/methods: Bougie  Endotracheal tube insertion site: oral  Blade: Zhao  Blade size: #2  ETT size: 7.5 mm  Cormack-Lehane Classification: grade IIa - partial view of glottis  Placement verified by: chest auscultation and capnometry   Cuff volume (mL): 8  Measured from: lips  ETT to lips (cm): 22  Number of attempts at approach: 1

## 2017-06-14 NOTE — ANESTHESIA PREPROCEDURE EVALUATION
Anesthesia Evaluation     Patient summary reviewed   no history of anesthetic complications:  NPO Solid Status: > 8 hours       Airway   Mallampati: II  TM distance: >3 FB  Neck ROM: full  Dental    (+) upper dentures and lower dentures    Pulmonary    (-) asthma, sleep apnea, not a smoker  Cardiovascular   Exercise tolerance: good (4-7 METS)    ECG reviewed  Patient on routine beta blocker and Beta blocker given within 24 hours of surgery    (+) hypertension,   (-) pacemaker, past MI, angina, cardiac stents      Neuro/Psych  (-) seizures, CVA  GI/Hepatic/Renal/Endo    (+) obesity,    (-) GERD, hepatitis, liver disease, no renal disease    Musculoskeletal     Abdominal    Substance History      OB/GYN          Other                                        Anesthesia Plan    ASA 2     general     intravenous induction   Anesthetic plan and risks discussed with patient.

## 2017-06-14 NOTE — H&P (VIEW-ONLY)
Subjective    Mr. Durán is 67 y.o. male    CHIEF COMPLAINT: Ureteral stone    The patient comes back today for follow-up of renal and ureteral stones he is moving minimally symptomatic he has not had any gross hematuria is had no flank pain no burning stinging etc.    Unfortunately his KUB shows a fragment that is unchanged from previous position also 2 smaller renal calculi    KUB independent review    A KUB is available for me to review today.  The image is inspected for a bowel gas pattern and the general bone structure of the spine and pelvis. The kidneys are then inspected closely.  Renal outline is noted if identifiable. The kidney, collecting system, and anticipated path of the ureter are examined for calcifications including those in the true pelvis.  This film reveals:    On the right there are no calcificaitons seen in the kidney or the expected course of the ureter. .    On the left there are multiple renal and proximal ureteral stones. 7mm.      History of Present Illness      The following portions of the patient's history were reviewed and updated as appropriate: allergies, current medications, past family history, past medical history, past social history, past surgical history and problem list.    Review of Systems   Constitutional: Negative.  Negative for chills and fever.   Gastrointestinal: Negative for abdominal distention, abdominal pain, anal bleeding, blood in stool and nausea.   Genitourinary: Positive for urgency. Negative for difficulty urinating, dysuria, flank pain, frequency and hematuria.   Psychiatric/Behavioral: Negative.  Negative for agitation and confusion.         Current Outpatient Prescriptions:   •  allopurinol (ZYLOPRIM) 300 MG tablet, , Disp: , Rfl: 3  •  amLODIPine (NORVASC) 5 MG tablet, , Disp: , Rfl: 5  •  atenolol (TENORMIN) 50 MG tablet, , Disp: , Rfl: 3  •  chlorhexidine (PERIDEX) 0.12 % solution, Apply 15 mL to the mouth or throat 2 (Two) Times a Day., Disp: , Rfl:  "  •  meloxicam (MOBIC) 7.5 MG tablet, , Disp: , Rfl:   •  Multiple Vitamins-Minerals (MULTIVITAMIN ADULT PO), Take  by mouth., Disp: , Rfl:   •  tamsulosin (FLOMAX) 0.4 MG capsule 24 hr capsule, , Disp: , Rfl: 5    Past Medical History:   Diagnosis Date   • Arthritis     gout   • BPH with urinary obstruction    • Calculus of kidney    • Calculus of ureter    • Hearing loss    • Hypertension    • Microscopic hematuria    • Nocturia        Past Surgical History:   Procedure Laterality Date   • CARPAL TUNNEL RELEASE     • EXTRACORPOREAL SHOCK WAVE LITHOTRIPSY (ESWL) Left 3/1/2017    Procedure: LEFT EXTRACORPOREAL SHOCKWAVE LITHOTRIPSY;  Surgeon: Emanuel Franco MD;  Location: [facility] OR;  Service:    • EXTRACORPOREAL SHOCKWAVE LITHOTRIPSY (ESWL), STENT INSERTION/REMOVAL     • HEMORRHOIDECTOMY     • KNEE SURGERY         Social History     Social History   • Marital status:      Spouse name: N/A   • Number of children: N/A   • Years of education: N/A     Social History Main Topics   • Smoking status: Never Smoker   • Smokeless tobacco: None   • Alcohol use No   • Drug use: No   • Sexual activity: Not Asked     Other Topics Concern   • None     Social History Narrative       Family History   Problem Relation Age of Onset   • No Known Problems Father    • No Known Problems Mother        Objective    /78  Temp 97.1 °F (36.2 °C)  Ht 74\" (188 cm)  Wt 263 lb (119 kg)  BMI 33.77 kg/m2    Physical Exam      Office Visit on 05/01/2017   Component Date Value Ref Range Status   • Color 05/01/2017 Yellow  Yellow, Straw, Dark Yellow, Adrianne Final   • Clarity, UA 05/01/2017 Clear  Clear Final   • Glucose, UA 05/01/2017 Negative  Negative, 1000 mg/dL (3+) mg/dL Final   • Bilirubin 05/01/2017 Negative  Negative Final   • Ketones, UA 05/01/2017 Negative  Negative Final   • Specific Gravity  05/01/2017 1.025  1.005 - 1.030 Final   • Blood, UA 05/01/2017 Small* Negative Final   • pH, Urine 05/01/2017 6.0  5.0 - 8.0 Final "   • Protein, POC 05/01/2017 Negative  Negative mg/dL Final   • Urobilinogen, UA 05/01/2017 1 E.U./dL * Normal Final   • Leukocytes 05/01/2017 Negative  Negative Final   • Nitrite, UA 05/01/2017 Negative  Negative Final       Results for orders placed or performed in visit on 06/01/17   POC Urinalysis Dipstick, Automated   Result Value Ref Range    Color Yellow Yellow, Straw, Dark Yellow, Adrianne    Clarity, UA Clear Clear    Glucose, UA Negative Negative, 1000 mg/dL (3+) mg/dL    Bilirubin Small (1+) (A) Negative    Ketones, UA Trace (A) Negative    Specific Gravity  1.030 1.005 - 1.030    Blood, UA Negative Negative    pH, Urine 5.5 5.0 - 8.0    Protein, POC 30 mg/dL (A) Negative mg/dL    Urobilinogen, UA 1 E.U./dL  (A) Normal    Leukocytes Trace (A) Negative    Nitrite, UA Negative Negative       Assessment and Plan    Martin was seen today for hydronephrosis with renal and ureteral calculus obstruction.    Diagnoses and all orders for this visit:    Hydronephrosis with renal and ureteral calculus obstruction  -     POC Urinalysis Dipstick, Automated    Calculus of kidney    Ureteral stone    BPH (benign prostatic hypertrophy) with urinary obstruction    Plan--I discussed the options with the patient regarding management spell little over 3 months now since he has had his previous ESWL and this fragment really has not moved suggested either a repeat ESWL or a ureteroscopy and discussed both with him he would like to go ahead and do ESWL we will schedule that again near future I told with this one does not work and obviously well then certainly would not do of third but we do ureteroscopy but hopefully this will take care of of answer all his questions    The patient has Left stone(s) that measure(s)  7 mm. The stones is/are located at the ureteropelvic junction without hydronephrosis.  The films, including size, location, density of stones and symptoms are discussed with the patient including how this influences the  treatment option recommended.  Options discussed with  the patient included ureteroscopic, open, and percutaneous approaches.  However the patient has chosen ESWL therapy due to its less invasive approach combined with its effectiveness.  Risks discussed include damage to the kidney, even long term, inability to render stone free with need for extra procedures, bleeding that may even result in need for blood transfusion (<1%) , Steinstrasse, and possible arrhythmia are explained.  The possible need for administration of intravenous contrast or placement of a ureteral localization catheter done by cystoscopy is also discussed.  The patient does understand the spontaneous passage of stones are an option but this is dependent typically on the size and location of the stone.  EMR Dragon/Transcription disclaimer:  Much of this encounter note is an electronic transcription/translation of spoken language to printed text. The electronic translation of spoken language may permit erroneous, or at times, nonsensical words or phrases to be inadvertently transcribed; although I have reviewed the note for such errors, some may still exist.     EMR Dragon/Transcription disclaimer:  Much of this encounter note is an electronic transcription/translation of spoken language to printed text. The electronic translation of spoken language may permit erroneous, or at times, nonsensical words or phrases to be inadvertently transcribed; although I have reviewed the note for such errors, some may still exist.

## 2017-06-27 ENCOUNTER — HOSPITAL ENCOUNTER (OUTPATIENT)
Dept: GENERAL RADIOLOGY | Facility: HOSPITAL | Age: 67
Discharge: HOME | End: 2017-06-27
Attending: UROLOGY | Admitting: UROLOGY

## 2017-06-27 ENCOUNTER — OFFICE VISIT (OUTPATIENT)
Dept: UROLOGY | Facility: CLINIC | Age: 67
End: 2017-06-27

## 2017-06-27 VITALS — BODY MASS INDEX: 33.11 KG/M2 | WEIGHT: 258 LBS | HEIGHT: 74 IN | TEMPERATURE: 98 F

## 2017-06-27 DIAGNOSIS — N13.2 HYDRONEPHROSIS WITH RENAL AND URETERAL CALCULUS OBSTRUCTION: Primary | ICD-10-CM

## 2017-06-27 DIAGNOSIS — N20.1 URETERAL STONE: ICD-10-CM

## 2017-06-27 DIAGNOSIS — N20.0 CALCULUS OF KIDNEY: ICD-10-CM

## 2017-06-27 LAB
BILIRUB BLD-MCNC: NEGATIVE MG/DL
CLARITY, POC: CLEAR
COLOR UR: YELLOW
GLUCOSE UR STRIP-MCNC: NEGATIVE MG/DL
KETONES UR QL: NEGATIVE
LEUKOCYTE EST, POC: ABNORMAL
NITRITE UR-MCNC: NEGATIVE MG/ML
PH UR: 6 [PH] (ref 5–8)
PROT UR STRIP-MCNC: NEGATIVE MG/DL
RBC # UR STRIP: ABNORMAL /UL
SP GR UR: 1.02 (ref 1–1.03)
UROBILINOGEN UR QL: NORMAL

## 2017-06-27 PROCEDURE — 99024 POSTOP FOLLOW-UP VISIT: CPT | Performed by: UROLOGY

## 2017-06-27 PROCEDURE — 74000 HC ABDOMEN KUB: CPT

## 2017-06-27 PROCEDURE — 81003 URINALYSIS AUTO W/O SCOPE: CPT | Performed by: UROLOGY

## 2017-06-27 NOTE — PROGRESS NOTES
Subjective    Mr. Durán is 67 y.o. male    CHIEF COMPLAINT: Follow-up ESWL    The patient is about 2 weeks now status post left ESWL for upper ureteral stone this was a second treatment on the KUB today I do not see the stone were was or maybe a little fragments to the UVJ but still little bit unclear he does have some smaller fragments in the kidney as well but he has had some present there previously as well.    He is totally asymptomatic he medically feels great showed them the x-rays      History of Present Illness      The following portions of the patient's history were reviewed and updated as appropriate: allergies, current medications, past family history, past medical history, past social history, past surgical history and problem list.    Review of Systems   Constitutional: Negative for chills and fever.   Gastrointestinal: Negative for abdominal pain, anal bleeding and blood in stool.   Genitourinary: Negative for flank pain and hematuria.         Current Outpatient Prescriptions:   •  allopurinol (ZYLOPRIM) 300 MG tablet, Take 300 mg by mouth Daily., Disp: , Rfl: 3  •  amLODIPine (NORVASC) 5 MG tablet, Take 5 mg by mouth Daily., Disp: , Rfl: 5  •  atenolol (TENORMIN) 50 MG tablet, Take 50 mg by mouth Daily., Disp: , Rfl: 3  •  chlorhexidine (PERIDEX) 0.12 % solution, Apply 15 mL to the mouth or throat 2 (Two) Times a Day., Disp: , Rfl:   •  HYDROcodone-acetaminophen (NORCO) 5-325 MG per tablet, Take 2 tablets by mouth Every 6 (Six) Hours As Needed (Pain)., Disp: 15 tablet, Rfl: 0  •  meloxicam (MOBIC) 7.5 MG tablet, Take 7.5 mg by mouth Daily. Stopped prior to surgery on 6/1/17, Disp: , Rfl:   •  Multiple Vitamins-Minerals (MULTIVITAMIN ADULT PO), Take 1 capsule by mouth Daily., Disp: , Rfl:   •  tamsulosin (FLOMAX) 0.4 MG capsule 24 hr capsule, Take 1 capsule by mouth Daily., Disp: , Rfl: 5    Past Medical History:   Diagnosis Date   • Arthritis     gout   • BPH with urinary obstruction    • Calculus  "of kidney    • Calculus of ureter    • Hearing loss    • Hypertension    • Microscopic hematuria    • Nocturia        Past Surgical History:   Procedure Laterality Date   • CARPAL TUNNEL RELEASE     • EXTRACORPOREAL SHOCK WAVE LITHOTRIPSY (ESWL) Left 3/1/2017    Procedure: LEFT EXTRACORPOREAL SHOCKWAVE LITHOTRIPSY;  Surgeon: Emanuel Franco MD;  Location: [facility] OR;  Service:    • EXTRACORPOREAL SHOCK WAVE LITHOTRIPSY (ESWL) Left 6/14/2017    Procedure: EXTRACORPOREAL SHOCKWAVE LITHOTRIPSY;  Surgeon: Emanuel Franco MD;  Location: [facility] OR;  Service:    • EXTRACORPOREAL SHOCKWAVE LITHOTRIPSY (ESWL), STENT INSERTION/REMOVAL     • HEMORRHOIDECTOMY     • KNEE SURGERY         Social History     Social History   • Marital status:      Spouse name: N/A   • Number of children: N/A   • Years of education: N/A     Social History Main Topics   • Smoking status: Former Smoker     Types: Pipe     Quit date: 1/1/1973   • Smokeless tobacco: Not on file      Comment: only a pipe smoker and quit many years ago   • Alcohol use No   • Drug use: No   • Sexual activity: Defer     Other Topics Concern   • Not on file     Social History Narrative       Family History   Problem Relation Age of Onset   • No Known Problems Father    • No Known Problems Mother        Objective    Temp 98 °F (36.7 °C)  Ht 74\" (188 cm)  Wt 258 lb (117 kg)  BMI 33.13 kg/m2    Physical Exam      Appointment on 06/07/2017   Component Date Value Ref Range Status   • Glucose 06/07/2017 111* 70 - 100 mg/dL Final   • BUN 06/07/2017 23* 5 - 21 mg/dL Final   • Creatinine 06/07/2017 1.05  0.50 - 1.40 mg/dL Final   • Sodium 06/07/2017 142  135 - 145 mmol/L Final   • Potassium 06/07/2017 4.2  3.5 - 5.3 mmol/L Final   • Chloride 06/07/2017 103  98 - 110 mmol/L Final   • CO2 06/07/2017 28.0  24.0 - 31.0 mmol/L Final   • Calcium 06/07/2017 9.7  8.4 - 10.4 mg/dL Final   • eGFR   Amer 06/07/2017 86  >60 mL/min/1.73 Final   • BUN/Creatinine Ratio " 06/07/2017 21.9  7.0 - 25.0 Final   • Anion Gap 06/07/2017 11.0  4.0 - 13.0 mmol/L Final   • WBC 06/07/2017 4.55* 4.80 - 10.80 10*3/mm3 Final   • RBC 06/07/2017 4.38* 4.80 - 5.90 10*6/mm3 Final   • Hemoglobin 06/07/2017 13.3* 14.0 - 18.0 g/dL Final   • Hematocrit 06/07/2017 39.5* 40.0 - 52.0 % Final   • MCV 06/07/2017 90.2  82.0 - 95.0 fL Final   • MCH 06/07/2017 30.4  28.0 - 32.0 pg Final   • MCHC 06/07/2017 33.7  33.0 - 36.0 g/dL Final   • RDW 06/07/2017 13.8  12.0 - 15.0 % Final   • RDW-SD 06/07/2017 45.7  40.0 - 54.0 fl Final   • MPV 06/07/2017 10.4  6.0 - 12.0 fL Final   • Platelets 06/07/2017 126* 130 - 400 10*3/mm3 Final   • Color, UA 06/07/2017 Yellow  Yellow, Straw Final   • Appearance, UA 06/07/2017 Clear  Clear Final   • pH, UA 06/07/2017 6.5  5.0 - 8.0 Final   • Specific Gravity, UA 06/07/2017 1.016  1.005 - 1.030 Final   • Glucose, UA 06/07/2017 Negative  Negative Final   • Ketones, UA 06/07/2017 Negative  Negative Final   • Bilirubin, UA 06/07/2017 Negative  Negative Final   • Blood, UA 06/07/2017 Trace* Negative Final   • Protein, UA 06/07/2017 Negative  Negative Final   • Leuk Esterase, UA 06/07/2017 Trace* Negative Final   • Nitrite, UA 06/07/2017 Negative  Negative Final   • Urobilinogen, UA 06/07/2017 1.0 E.U./dL  0.2 - 1.0 E.U./dL Final   • RBC, UA 06/07/2017 6-12* None Seen /HPF Final   • WBC, UA 06/07/2017 3-5* None Seen /HPF Final   • Bacteria, UA 06/07/2017 None Seen  None Seen /HPF Final   • Squamous Epithelial Cells, UA 06/07/2017 None Seen  None Seen, 0-2 /HPF Final   • Hyaline Casts, UA 06/07/2017 None Seen  None Seen /LPF Final   • Methodology 06/07/2017 Automated Microscopy   Final       Results for orders placed or performed in visit on 06/27/17   POC Urinalysis Dipstick, Automated   Result Value Ref Range    Color Yellow Yellow, Straw, Dark Yellow, Adrianne    Clarity, UA Clear Clear    Glucose, UA Negative Negative, 1000 mg/dL (3+) mg/dL    Bilirubin Negative Negative    Ketones, UA  Negative Negative    Specific Gravity  1.025 1.005 - 1.030    Blood, UA Trace (A) Negative    pH, Urine 6.0 5.0 - 8.0    Protein, POC Negative Negative mg/dL    Urobilinogen, UA Normal Normal    Leukocytes Trace (A) Negative    Nitrite, UA Negative Negative       Assessment and Plan    Diagnoses and all orders for this visit:    Hydronephrosis with renal and ureteral calculus obstruction  -     POC Urinalysis Dipstick, Automated    Calculus of kidney  -     XR Abdomen KUB; Future  -     US Renal Bilateral; Future  Plan--on was seen back again in 3 months time we will check an ultrasound his kidneys and confirmed to rule out Hydro and then a KUB if he has any symptoms or anything prior then he will call    EMR Dragon/Transcription disclaimer:  Much of this encounter note is an electronic transcription/translation of spoken language to printed text. The electronic translation of spoken language may permit erroneous, or at times, nonsensical words or phrases to be inadvertently transcribed; although I have reviewed the note for such errors, some may still exist.

## 2017-10-09 ENCOUNTER — HOSPITAL ENCOUNTER (OUTPATIENT)
Dept: GENERAL RADIOLOGY | Facility: HOSPITAL | Age: 67
Discharge: HOME | End: 2017-10-09
Attending: UROLOGY

## 2017-10-09 ENCOUNTER — HOSPITAL ENCOUNTER (OUTPATIENT)
Dept: ULTRASOUND IMAGING | Facility: HOSPITAL | Age: 67
Discharge: HOME | End: 2017-10-09
Attending: UROLOGY | Admitting: UROLOGY

## 2017-10-09 DIAGNOSIS — N20.0 CALCULUS OF KIDNEY: ICD-10-CM

## 2017-10-09 PROCEDURE — 74000 HC ABDOMEN KUB: CPT

## 2017-10-09 PROCEDURE — 76775 US EXAM ABDO BACK WALL LIM: CPT

## 2017-10-10 ENCOUNTER — OFFICE VISIT (OUTPATIENT)
Dept: UROLOGY | Facility: CLINIC | Age: 67
End: 2017-10-10

## 2017-10-10 ENCOUNTER — APPOINTMENT (OUTPATIENT)
Dept: GENERAL RADIOLOGY | Facility: HOSPITAL | Age: 67
End: 2017-10-10
Attending: FAMILY MEDICINE

## 2017-10-10 VITALS — WEIGHT: 260 LBS | HEIGHT: 74 IN | BODY MASS INDEX: 33.37 KG/M2 | TEMPERATURE: 98.6 F

## 2017-10-10 DIAGNOSIS — N40.1 BENIGN PROSTATIC HYPERPLASIA WITH LOWER URINARY TRACT SYMPTOMS, SYMPTOM DETAILS UNSPECIFIED: ICD-10-CM

## 2017-10-10 DIAGNOSIS — N20.0 CALCULUS OF KIDNEY: ICD-10-CM

## 2017-10-10 DIAGNOSIS — N20.0 RENAL STONE: ICD-10-CM

## 2017-10-10 DIAGNOSIS — N13.2 HYDRONEPHROSIS WITH RENAL AND URETERAL CALCULUS OBSTRUCTION: Primary | ICD-10-CM

## 2017-10-10 DIAGNOSIS — N20.1 URETERAL STONE: ICD-10-CM

## 2017-10-10 DIAGNOSIS — N28.1 RENAL CYST: ICD-10-CM

## 2017-10-10 LAB
BILIRUB BLD-MCNC: NEGATIVE MG/DL
CLARITY, POC: CLEAR
COLOR UR: YELLOW
GLUCOSE UR STRIP-MCNC: NEGATIVE MG/DL
KETONES UR QL: NEGATIVE
LEUKOCYTE EST, POC: NEGATIVE
NITRITE UR-MCNC: NEGATIVE MG/ML
PH UR: 6.5 [PH] (ref 5–8)
PROT UR STRIP-MCNC: ABNORMAL MG/DL
RBC # UR STRIP: ABNORMAL /UL
SP GR UR: 1.02 (ref 1–1.03)
UROBILINOGEN UR QL: NORMAL

## 2017-10-10 PROCEDURE — 71020 HC CHEST PA AND LATERAL: CPT

## 2017-10-10 PROCEDURE — 99213 OFFICE O/P EST LOW 20 MIN: CPT | Performed by: UROLOGY

## 2017-10-10 PROCEDURE — 81003 URINALYSIS AUTO W/O SCOPE: CPT | Performed by: UROLOGY

## 2017-10-10 NOTE — PROGRESS NOTES
Subjective    Mr. Durán is 67 y.o. male    CHIEF COMPLAINT: Kidney stones    The patient came back today for follow-up of stones clinically he is doing well he denies any flank pain there is no gross hematuria no burning stinging urgency frequency etc.  The patient does take allopurinol    Patient also followed for BPH he did have a PSA back in January where he at that she was quite low his AUA score today is 9 he is on Flomax medications for his prostate at this time    KUB independent review    A KUB is available for me to review today.  The image is inspected for a bowel gas pattern and the general bone structure of the spine and pelvis. The kidneys are then inspected closely.  Renal outline is noted if identifiable. The kidney, collecting system, and anticipated path of the ureter are examined for calcifications including those in the true pelvis.  This film reveals:    On the right there is a single renal stone measuring 6 mm.    On the left there are multiple renal stones. 3mm clustered in the lower pole    Renal ultrasound independent review    The renal ultrasound is available for me to review.  Treatment recommendations require an independent review.  This film has been reviewed by the radiologist to determine any non urologic abnormalities that are presents.  However, I very closely inspected the kidneys for size, symmetry, contour, parenchymal thickness, perinephric reaction, presence of calcifications, and intrarenal dilation of the collecting system.       The right kidney appears abnormal on this ultrasound.     Normal parenchymal thickness, No hydronephrosis, No hydroureter, No renal masses, No renal cysts and Single renal stone measuring 6 mm    The left kidney appears abnormal on this ultrasound.     Normal parenchymal thickness, No hydronephrosis, No hydroureter, No renal masses, Single renal cyst measuring 10 mm and Mulltiple renal stones measuring 3mm    The bladder appears normal on  thisultrsaound.  The bladder appears normal in thickness.  There no masses or stones seen on this exam..    History of Present Illness      The following portions of the patient's history were reviewed and updated as appropriate: allergies, current medications, past family history, past medical history, past social history, past surgical history and problem list.    Review of Systems   Constitutional: Negative for chills and fever.   Gastrointestinal: Negative for abdominal pain, anal bleeding and blood in stool.   Genitourinary: Negative for difficulty urinating, flank pain, frequency, hematuria and urgency.         Current Outpatient Prescriptions:   •  allopurinol (ZYLOPRIM) 300 MG tablet, Take 300 mg by mouth Daily., Disp: , Rfl: 3  •  amLODIPine (NORVASC) 5 MG tablet, Take 5 mg by mouth Daily., Disp: , Rfl: 5  •  atenolol (TENORMIN) 50 MG tablet, Take 50 mg by mouth Daily., Disp: , Rfl: 3  •  meloxicam (MOBIC) 7.5 MG tablet, Take 7.5 mg by mouth Daily. Stopped prior to surgery on 6/1/17, Disp: , Rfl:   •  Multiple Vitamins-Minerals (MULTIVITAMIN ADULT PO), Take 1 capsule by mouth Daily., Disp: , Rfl:   •  tamsulosin (FLOMAX) 0.4 MG capsule 24 hr capsule, Take 1 capsule by mouth Daily., Disp: , Rfl: 5    Past Medical History:   Diagnosis Date   • Arthritis     gout   • BPH with urinary obstruction    • Calculus of kidney    • Calculus of ureter    • Hearing loss    • Hypertension    • Microscopic hematuria    • Nocturia        Past Surgical History:   Procedure Laterality Date   • CARPAL TUNNEL RELEASE     • EXTRACORPOREAL SHOCK WAVE LITHOTRIPSY (ESWL) Left 3/1/2017    Procedure: LEFT EXTRACORPOREAL SHOCKWAVE LITHOTRIPSY;  Surgeon: Emanuel Franco MD;  Location: [facility] OR;  Service:    • EXTRACORPOREAL SHOCK WAVE LITHOTRIPSY (ESWL) Left 6/14/2017    Procedure: EXTRACORPOREAL SHOCKWAVE LITHOTRIPSY;  Surgeon: Emanuel Franco MD;  Location: [facility] OR;  Service:    • EXTRACORPOREAL SHOCKWAVE LITHOTRIPSY  "(ESWL), STENT INSERTION/REMOVAL     • HEMORRHOIDECTOMY     • KNEE SURGERY         Social History     Social History   • Marital status:      Spouse name: N/A   • Number of children: N/A   • Years of education: N/A     Social History Main Topics   • Smoking status: Former Smoker     Types: Pipe     Quit date: 1/1/1973   • Smokeless tobacco: Never Used      Comment: only a pipe smoker and quit many years ago   • Alcohol use No   • Drug use: No   • Sexual activity: Defer     Other Topics Concern   • None     Social History Narrative       Family History   Problem Relation Age of Onset   • No Known Problems Father    • No Known Problems Mother        Objective    Temp 98.6 °F (37 °C)  Ht 74\" (188 cm)  Wt 260 lb (118 kg)  BMI 33.38 kg/m2    Physical Exam      Office Visit on 06/27/2017   Component Date Value Ref Range Status   • Color 06/27/2017 Yellow  Yellow, Straw, Dark Yellow, Adrianne Final   • Clarity, UA 06/27/2017 Clear  Clear Final   • Glucose, UA 06/27/2017 Negative  Negative, 1000 mg/dL (3+) mg/dL Final   • Bilirubin 06/27/2017 Negative  Negative Final   • Ketones, UA 06/27/2017 Negative  Negative Final   • Specific Gravity  06/27/2017 1.025  1.005 - 1.030 Final   • Blood, UA 06/27/2017 Trace* Negative Final   • pH, Urine 06/27/2017 6.0  5.0 - 8.0 Final   • Protein, POC 06/27/2017 Negative  Negative mg/dL Final   • Urobilinogen, UA 06/27/2017 Normal  Normal Final   • Leukocytes 06/27/2017 Trace* Negative Final   • Nitrite, UA 06/27/2017 Negative  Negative Final       Results for orders placed or performed in visit on 10/10/17   POC Urinalysis Dipstick, Automated   Result Value Ref Range    Color Yellow Yellow, Straw, Dark Yellow, Adrianne    Clarity, UA Clear Clear    Glucose, UA Negative Negative, 1000 mg/dL (3+) mg/dL    Bilirubin Negative Negative    Ketones, UA Negative Negative    Specific Gravity  1.025 1.005 - 1.030    Blood, UA Small (A) Negative    pH, Urine 6.5 5.0 - 8.0    Protein, POC 30 mg/dL " (A) Negative mg/dL    Urobilinogen, UA Normal Normal    Leukocytes Negative Negative    Nitrite, UA Negative Negative     Microscopic Urinalysis  I inspected the urine myself based on the clinical situation including the dipstick urine. The urine is spun in a centrifuge for three minutes. The spun urine shows 3-6 rbc/hpf, none wbc/hpf, none epi/hpf, negative bacteria, negative crystals, and negative casts.     Assessment and Plan    Martin was seen today for hydronephrosis with renal and utreteral calculus obstruction.    Diagnoses and all orders for this visit:    Hydronephrosis with renal and ureteral calculus obstruction  -     POC Urinalysis Dipstick, Automated    Calculus of kidney  -     POC Urinalysis Dipstick, Automated  -     XR Abdomen KUB; Future    Renal cyst  -     POC Urinalysis Dipstick, Automated    Renal stone  -     POC Urinalysis Dipstick, Automated    Ureteral stone  -     POC Urinalysis Dipstick, Automated    Benign prostatic hyperplasia with lower urinary tract symptoms, symptom details unspecified  -     PSA; Future    Plan--from a stone point review I showed the x-rays to Mr. Durán again these are small and broken up and in the lower pole so if he should start passing them hopefully he should be able to pass them otherwise we will seen back again in a year with a KUB again that starts having severe pain fever etc. he will call    From a BPH point review he is doing well also so we will seen back again in a year with a PSA

## 2017-10-15 ENCOUNTER — RESULTS ENCOUNTER (OUTPATIENT)
Dept: UROLOGY | Facility: CLINIC | Age: 67
End: 2017-10-15

## 2017-10-15 DIAGNOSIS — N40.1 BENIGN PROSTATIC HYPERPLASIA WITH LOWER URINARY TRACT SYMPTOMS, SYMPTOM DETAILS UNSPECIFIED: ICD-10-CM

## 2017-10-17 ENCOUNTER — OFFICE VISIT (OUTPATIENT)
Dept: UROLOGY | Facility: CLINIC | Age: 67
End: 2017-10-17

## 2017-10-17 VITALS
WEIGHT: 263.6 LBS | TEMPERATURE: 98.6 F | DIASTOLIC BLOOD PRESSURE: 70 MMHG | BODY MASS INDEX: 33.83 KG/M2 | HEIGHT: 74 IN | SYSTOLIC BLOOD PRESSURE: 114 MMHG

## 2017-10-17 DIAGNOSIS — R31.0 GROSS HEMATURIA: Primary | ICD-10-CM

## 2017-10-17 DIAGNOSIS — N20.0 CALCULUS OF KIDNEY: ICD-10-CM

## 2017-10-17 DIAGNOSIS — N20.1 URETERAL STONE: ICD-10-CM

## 2017-10-17 DIAGNOSIS — N40.1 BENIGN PROSTATIC HYPERPLASIA WITH LOWER URINARY TRACT SYMPTOMS, SYMPTOM DETAILS UNSPECIFIED: ICD-10-CM

## 2017-10-17 LAB
BILIRUB BLD-MCNC: ABNORMAL MG/DL
CLARITY, POC: CLEAR
COLOR UR: YELLOW
GLUCOSE UR STRIP-MCNC: NEGATIVE MG/DL
KETONES UR QL: ABNORMAL
LEUKOCYTE EST, POC: NEGATIVE
NITRITE UR-MCNC: NEGATIVE MG/ML
PH UR: 6.5 [PH] (ref 5–8)
PROT UR STRIP-MCNC: ABNORMAL MG/DL
RBC # UR STRIP: ABNORMAL /UL
SP GR UR: 1.03 (ref 1–1.03)
UROBILINOGEN UR QL: ABNORMAL

## 2017-10-17 PROCEDURE — 81003 URINALYSIS AUTO W/O SCOPE: CPT | Performed by: UROLOGY

## 2017-10-17 PROCEDURE — 99214 OFFICE O/P EST MOD 30 MIN: CPT | Performed by: UROLOGY

## 2017-10-17 NOTE — PROGRESS NOTES
Subjective    Mr. Durán is 67 y.o. male    CHIEF COMPLAINT: Gross hematuria    The patient comes back I just saw not that long ago but that did not last night and this morning he what workup of basically gross painless hematuria no flank pain no real burning stinging urgency no symptoms of stone disease etc.  His most recent KUB showed bilateral calculi but again these were nonobstructing in the periphery of the kidney        He brings his urine today and it is dark looking and it looks more like old blood and fresh blood again no symptoms of infection or anything like that at all.    I reviewed his old records I have done a cystoscopy on name back in 13 and 15 but not done one since.    He has had a couple ESWL's last one being in June was thinking it was a little more recent anatomy that might account for the blood but I am little hard pressed to say that that blood would be coming from the procedure that was done that long ago.    He is not had a recent CAT scan all he has had ultrasounds kidneys.    Also pertinent to his history is working at the [employer]      History of Present Illness      The following portions of the patient's history were reviewed and updated as appropriate: allergies, current medications, past family history, past medical history, past social history, past surgical history and problem list.    Review of Systems   Constitutional: Negative for chills and fever.   Gastrointestinal: Negative for abdominal pain, anal bleeding and blood in stool.   Genitourinary: Positive for hematuria. Negative for difficulty urinating, dysuria, flank pain, frequency and urgency.         Current Outpatient Prescriptions:   •  allopurinol (ZYLOPRIM) 300 MG tablet, Take 300 mg by mouth Daily., Disp: , Rfl: 3  •  amLODIPine (NORVASC) 5 MG tablet, Take 5 mg by mouth Daily., Disp: , Rfl: 5  •  atenolol (TENORMIN) 50 MG tablet, Take 50 mg by mouth Daily., Disp: , Rfl: 3  •  meloxicam (MOBIC) 7.5 MG tablet, Take  "7.5 mg by mouth Daily. Stopped prior to surgery on 6/1/17, Disp: , Rfl:   •  Multiple Vitamins-Minerals (MULTIVITAMIN ADULT PO), Take 1 capsule by mouth Daily., Disp: , Rfl:   •  tamsulosin (FLOMAX) 0.4 MG capsule 24 hr capsule, Take 1 capsule by mouth Daily., Disp: , Rfl: 5    Past Medical History:   Diagnosis Date   • Arthritis     gout   • BPH with urinary obstruction    • Calculus of kidney    • Calculus of ureter    • Hearing loss    • Hypertension    • Microscopic hematuria    • Nocturia        Past Surgical History:   Procedure Laterality Date   • CARPAL TUNNEL RELEASE     • EXTRACORPOREAL SHOCK WAVE LITHOTRIPSY (ESWL) Left 3/1/2017    Procedure: LEFT EXTRACORPOREAL SHOCKWAVE LITHOTRIPSY;  Surgeon: Emanuel Franco MD;  Location: [facility] OR;  Service:    • EXTRACORPOREAL SHOCK WAVE LITHOTRIPSY (ESWL) Left 6/14/2017    Procedure: EXTRACORPOREAL SHOCKWAVE LITHOTRIPSY;  Surgeon: Emanuel Franco MD;  Location: [facility] OR;  Service:    • EXTRACORPOREAL SHOCKWAVE LITHOTRIPSY (ESWL), STENT INSERTION/REMOVAL     • HEMORRHOIDECTOMY     • KNEE SURGERY         Social History     Social History   • Marital status:      Spouse name: N/A   • Number of children: N/A   • Years of education: N/A     Social History Main Topics   • Smoking status: Former Smoker     Types: Pipe     Quit date: 1/1/1973   • Smokeless tobacco: Never Used      Comment: only a pipe smoker and quit many years ago   • Alcohol use No   • Drug use: No   • Sexual activity: Defer     Other Topics Concern   • None     Social History Narrative       Family History   Problem Relation Age of Onset   • No Known Problems Father    • No Known Problems Mother        Objective    /70  Temp 98.6 °F (37 °C)  Ht 74\" (188 cm)  Wt 263 lb 9.6 oz (120 kg)  BMI 33.84 kg/m2    Physical Exam      Office Visit on 10/10/2017   Component Date Value Ref Range Status   • Color 10/10/2017 Yellow  Yellow, Straw, Dark Yellow, Adrianne Final   • Clarity, UA " 10/10/2017 Clear  Clear Final   • Glucose, UA 10/10/2017 Negative  Negative, 1000 mg/dL (3+) mg/dL Final   • Bilirubin 10/10/2017 Negative  Negative Final   • Ketones, UA 10/10/2017 Negative  Negative Final   • Specific Gravity  10/10/2017 1.025  1.005 - 1.030 Final   • Blood, UA 10/10/2017 Small* Negative Final   • pH, Urine 10/10/2017 6.5  5.0 - 8.0 Final   • Protein, POC 10/10/2017 30 mg/dL* Negative mg/dL Final   • Urobilinogen, UA 10/10/2017 Normal  Normal Final   • Leukocytes 10/10/2017 Negative  Negative Final   • Nitrite, UA 10/10/2017 Negative  Negative Final       Results for orders placed or performed in visit on 10/17/17   POC Urinalysis Dipstick, Automated   Result Value Ref Range    Color Yellow Yellow, Straw, Dark Yellow, Adrainne    Clarity, UA Clear Clear    Glucose, UA Negative Negative, 1000 mg/dL (3+) mg/dL    Bilirubin Small (1+) (A) Negative    Ketones, UA 15 mg/dL (A) Negative    Specific Gravity  1.030 1.005 - 1.030    Blood, UA Large (A) Negative    pH, Urine 6.5 5.0 - 8.0    Protein,  mg/dL (A) Negative mg/dL    Urobilinogen, UA 1 E.U./dL  (A) Normal    Leukocytes Negative Negative    Nitrite, UA Negative Negative   Microscopic Urinalysis  I inspected the urine myself based on the clinical situation including the dipstick urine. The urine is spun in a centrifuge for three minutes. The spun urine shows Too numerous to count rbc/hpf, none wbc/hpf, none epi/hpf, negative bacteria, negative crystals, and negative casts.       Assessment and Plan    Diagnoses and all orders for this visit:    Gross hematuria  -     POC Urinalysis Dipstick, Automated  -     CT Abdomen Pelvis With & Without Contrast; Future    Benign prostatic hyperplasia with lower urinary tract symptoms, symptom details unspecified    Calculus of kidney    Ureteral stone    Plan--I think the patient is a little bit concerned that he has some gross hematuria as MI I do not have a good reason for the last one the stones on the  move but he is totally asymptomatic.    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    If he wishes are having any pain fever etc. prior then he will let us know or bright red blood

## 2017-10-18 ENCOUNTER — TELEPHONE (OUTPATIENT)
Dept: UROLOGY | Facility: CLINIC | Age: 67
End: 2017-10-18

## 2017-10-18 NOTE — TELEPHONE ENCOUNTER
Patient called-had been called regarding his CT scan thati is scheduled for 11/13/17.      Patient has allergy to shellfish.   told him to contact us to see if his CT order needs to be changed due to this.

## 2017-10-25 ENCOUNTER — TELEPHONE (OUTPATIENT)
Dept: UROLOGY | Facility: CLINIC | Age: 67
End: 2017-10-25

## 2017-10-25 NOTE — TELEPHONE ENCOUNTER
Called and spoke with Jennifer   Prednisone 50mg   1 tablet by mouth 13 hours, 7 hours, and 1 hour before procedure.  Benadryl 50mg 1 tablet by mouth with prednisone dose 1 hour before procedure.  10/25/17

## 2017-11-13 ENCOUNTER — OFFICE VISIT (OUTPATIENT)
Dept: UROLOGY | Facility: CLINIC | Age: 67
End: 2017-11-13

## 2017-11-13 ENCOUNTER — HOSPITAL ENCOUNTER (OUTPATIENT)
Dept: CT IMAGING | Facility: HOSPITAL | Age: 67
Discharge: HOME | End: 2017-11-13
Attending: UROLOGY | Admitting: UROLOGY

## 2017-11-13 VITALS
TEMPERATURE: 98.5 F | HEIGHT: 74 IN | WEIGHT: 264.6 LBS | BODY MASS INDEX: 33.96 KG/M2 | SYSTOLIC BLOOD PRESSURE: 114 MMHG | DIASTOLIC BLOOD PRESSURE: 78 MMHG

## 2017-11-13 DIAGNOSIS — R31.0 GROSS HEMATURIA: Primary | ICD-10-CM

## 2017-11-13 DIAGNOSIS — N28.89 URETEROCELE: ICD-10-CM

## 2017-11-13 DIAGNOSIS — R35.1 NOCTURIA: ICD-10-CM

## 2017-11-13 DIAGNOSIS — N20.0 CALCULUS OF KIDNEY: ICD-10-CM

## 2017-11-13 DIAGNOSIS — R31.0 GROSS HEMATURIA: ICD-10-CM

## 2017-11-13 DIAGNOSIS — N40.1 BENIGN PROSTATIC HYPERPLASIA WITH LOWER URINARY TRACT SYMPTOMS, SYMPTOM DETAILS UNSPECIFIED: ICD-10-CM

## 2017-11-13 DIAGNOSIS — N20.0 RENAL STONE: ICD-10-CM

## 2017-11-13 DIAGNOSIS — N20.1 URETERAL STONE: ICD-10-CM

## 2017-11-13 DIAGNOSIS — N13.2 HYDRONEPHROSIS WITH RENAL AND URETERAL CALCULUS OBSTRUCTION: ICD-10-CM

## 2017-11-13 DIAGNOSIS — N28.1 RENAL CYST: ICD-10-CM

## 2017-11-13 LAB
BILIRUB BLD-MCNC: NEGATIVE MG/DL
CLARITY, POC: CLEAR
COLOR UR: YELLOW
CREAT BLDA-MCNC: 1 MG/DL (ref 0.6–1.3)
GLUCOSE UR STRIP-MCNC: NEGATIVE MG/DL
KETONES UR QL: NEGATIVE
LEUKOCYTE EST, POC: NEGATIVE
NITRITE UR-MCNC: NEGATIVE MG/ML
PH UR: 6 [PH] (ref 5–8)
PROT UR STRIP-MCNC: ABNORMAL MG/DL
RBC # UR STRIP: ABNORMAL /UL
SP GR UR: 1 (ref 1–1.03)
UROBILINOGEN UR QL: NORMAL

## 2017-11-13 PROCEDURE — 99214 OFFICE O/P EST MOD 30 MIN: CPT | Performed by: UROLOGY

## 2017-11-13 PROCEDURE — 74178 CT ABD&PLV WO CNTR FLWD CNTR: CPT

## 2017-11-13 PROCEDURE — 81003 URINALYSIS AUTO W/O SCOPE: CPT | Performed by: UROLOGY

## 2017-11-13 PROCEDURE — 0 IOPAMIDOL 61 % SOLUTION: Performed by: UROLOGY

## 2017-11-13 PROCEDURE — 82565 ASSAY OF CREATININE: CPT

## 2017-11-13 RX ADMIN — IOPAMIDOL 150 ML: 612 INJECTION, SOLUTION INTRAVENOUS at 10:30

## 2017-11-13 NOTE — PROGRESS NOTES
Subjective    Mr. Durán is 67 y.o. male    CHIEF COMPLAINT: Hematuria    The patient comes in today for follow-up an episode of gross hematuria he did not get a CT urogram which was interesting he does have small bilateral collections of stones on both sides but also in the distal left ureter was about a 6-7 mm stone with mild hydronephrosis    CT independent reivew    The CT scan of the abdomen/pelvis done with and without contrast is available for me to review.  Treatment recommendations require an independent review.  First I scanned the liver, spleen, and bowel pattern.  The retroperitoneum including the major vessels and lymphatic packages are briefly reviewed.  This film as been reviewed by the radiologist to determine any non urologic abnormalities that are present.  The kidneys are closely inspected for size, symmetry, contour, parenchymal thickness, perinephric reaction, presence of calcifications, and intrarenal dilation of the collecting system.  The ureters are inspected for their course, caliber, and any calcifications.  The bladder is inspected for its thickness, size, and presence of any calcifications.  This scan shows:    The right kidney appears abnormal on this contrasted CT scan.   Normal parenchymal thickness, No hydronephrosis, No hydroureter, No renal masses, No renal cysts and Single renal stone measuring 6 mm    The left kidney appears abnormal on this contrasted CT scan.   Normal parenchymal thickness, No renal masses, mild hydronephrosis, mild hydroureter, Single renal cyst measuring 30 mm, Mulltiple renal stones measuring 7mm and Ureteral stone measuring 7mmmm    The bladder appears normal on this non-contrasted CT scan.  The bladder appears normal in thickness.  There no masses or stones seen on this exam    He is had no further gross hematuria since we saw him no symptoms of flank pain no fever no burning stinging etc.      History of Present Illness      The following portions of the  patient's history were reviewed and updated as appropriate: allergies, current medications, past family history, past medical history, past social history, past surgical history and problem list.    Review of Systems   Constitutional: Negative for chills and fever.   Gastrointestinal: Negative for abdominal pain, anal bleeding and blood in stool.   Genitourinary: Positive for frequency. Negative for difficulty urinating, flank pain, hematuria and urgency.         Current Outpatient Prescriptions:   •  allopurinol (ZYLOPRIM) 300 MG tablet, Take 300 mg by mouth Daily., Disp: , Rfl: 3  •  amLODIPine (NORVASC) 5 MG tablet, Take 5 mg by mouth Daily., Disp: , Rfl: 5  •  atenolol (TENORMIN) 50 MG tablet, Take 50 mg by mouth Daily., Disp: , Rfl: 3  •  meloxicam (MOBIC) 7.5 MG tablet, Take 7.5 mg by mouth Daily. Stopped prior to surgery on 6/1/17, Disp: , Rfl:   •  Multiple Vitamins-Minerals (MULTIVITAMIN ADULT PO), Take 1 capsule by mouth Daily., Disp: , Rfl:   •  tamsulosin (FLOMAX) 0.4 MG capsule 24 hr capsule, Take 1 capsule by mouth Daily., Disp: , Rfl: 5  No current facility-administered medications for this visit.     Past Medical History:   Diagnosis Date   • Arthritis     gout   • BPH with urinary obstruction    • Calculus of kidney    • Calculus of ureter    • Hearing loss    • Hypertension    • Microscopic hematuria    • Nocturia        Past Surgical History:   Procedure Laterality Date   • CARPAL TUNNEL RELEASE     • EXTRACORPOREAL SHOCK WAVE LITHOTRIPSY (ESWL) Left 3/1/2017    Procedure: LEFT EXTRACORPOREAL SHOCKWAVE LITHOTRIPSY;  Surgeon: Emanuel Franco MD;  Location: [facility] OR;  Service:    • EXTRACORPOREAL SHOCK WAVE LITHOTRIPSY (ESWL) Left 6/14/2017    Procedure: EXTRACORPOREAL SHOCKWAVE LITHOTRIPSY;  Surgeon: Emanuel Franco MD;  Location: [facility] OR;  Service:    • EXTRACORPOREAL SHOCKWAVE LITHOTRIPSY (ESWL), STENT INSERTION/REMOVAL     • HEMORRHOIDECTOMY     • KNEE SURGERY         Social History  "    Social History   • Marital status:      Spouse name: N/A   • Number of children: N/A   • Years of education: N/A     Social History Main Topics   • Smoking status: Former Smoker     Types: Pipe     Quit date: 1/1/1973   • Smokeless tobacco: Never Used      Comment: only a pipe smoker and quit many years ago   • Alcohol use No   • Drug use: No   • Sexual activity: Defer     Other Topics Concern   • None     Social History Narrative       Family History   Problem Relation Age of Onset   • No Known Problems Father    • No Known Problems Mother        Objective    /78  Temp 98.5 °F (36.9 °C)  Ht 74\" (188 cm)  Wt 264 lb 9.6 oz (120 kg)  BMI 33.97 kg/m2    Physical Exam      Hospital Outpatient Visit on 11/13/2017   Component Date Value Ref Range Status   • Creatinine 11/13/2017 1.00  0.60 - 1.30 mg/dL Final    Serial Number: [id number]:  [id number]       Results for orders placed or performed in visit on 11/13/17   POC Urinalysis Dipstick, Automated   Result Value Ref Range    Color Yellow Yellow, Straw, Dark Yellow, Adrianne    Clarity, UA Clear Clear    Glucose, UA Negative Negative, 1000 mg/dL (3+) mg/dL    Bilirubin Negative Negative    Ketones, UA Negative Negative    Specific Gravity  1.005 1.005 - 1.030    Blood, UA Large (A) Negative    pH, Urine 6.0 5.0 - 8.0    Protein, POC Trace (A) Negative mg/dL    Urobilinogen, UA Normal Normal    Leukocytes Negative Negative    Nitrite, UA Negative Negative     Microscopic Urinalysis  I inspected the urine myself based on the clinical situation including the dipstick urine. The urine is spun in a centrifuge for three minutes. The spun urine shows 7-12 rbc/hpf, 0-2 wbc/hpf, none epi/hpf, negative bacteria, negative crystals, and negative casts.     Assessment and Plan    Diagnoses and all orders for this visit:    Gross hematuria  -     POC Urinalysis Dipstick, Automated  -     XR Abdomen KUB; Future    Nocturia    Ureteral stone    Renal " stone    Hydronephrosis with renal and ureteral calculus obstruction    Benign prostatic hyperplasia with lower urinary tract symptoms, symptom details unspecified    Ureterocele    Calculus of kidney    Renal cyst    Plan--I discussed the new diagnosis with the patient again at this point time he is asymptomatic he has passed pretty good size stones previously so WE will see if he cannot pass the stone.    I went back and looked on his KUB I cannot see it there on a KUB done back in October on really did have a small collection of stones up in the left lower pole.    After thorough discussed the options were to continue to watch things on was seen back again in about 3 weeks with a KUB he will call sooner when necessary pain he for etc.    We also discussed a renal stones renal cyst etc. but that again these are asymptomatic at this time

## 2017-12-14 ENCOUNTER — OFFICE VISIT (OUTPATIENT)
Dept: UROLOGY | Facility: CLINIC | Age: 67
End: 2017-12-14

## 2017-12-14 ENCOUNTER — HOSPITAL ENCOUNTER (OUTPATIENT)
Dept: GENERAL RADIOLOGY | Facility: HOSPITAL | Age: 67
Discharge: HOME | End: 2017-12-14
Attending: UROLOGY | Admitting: UROLOGY

## 2017-12-14 VITALS — HEIGHT: 74 IN | TEMPERATURE: 98.5 F | BODY MASS INDEX: 34.08 KG/M2 | WEIGHT: 265.6 LBS

## 2017-12-14 DIAGNOSIS — N40.1 BENIGN PROSTATIC HYPERPLASIA WITH LOWER URINARY TRACT SYMPTOMS, SYMPTOM DETAILS UNSPECIFIED: ICD-10-CM

## 2017-12-14 DIAGNOSIS — R31.0 GROSS HEMATURIA: ICD-10-CM

## 2017-12-14 DIAGNOSIS — N13.2 HYDRONEPHROSIS WITH RENAL AND URETERAL CALCULUS OBSTRUCTION: ICD-10-CM

## 2017-12-14 DIAGNOSIS — N20.1 URETERAL STONE: ICD-10-CM

## 2017-12-14 DIAGNOSIS — N20.0 RENAL STONE: Primary | ICD-10-CM

## 2017-12-14 LAB
BILIRUB BLD-MCNC: NEGATIVE MG/DL
CLARITY, POC: CLEAR
COLOR UR: YELLOW
GLUCOSE UR STRIP-MCNC: NEGATIVE MG/DL
KETONES UR QL: NEGATIVE
LEUKOCYTE EST, POC: NEGATIVE
NITRITE UR-MCNC: NEGATIVE MG/ML
PH UR: 7 [PH] (ref 5–8)
PROT UR STRIP-MCNC: NEGATIVE MG/DL
RBC # UR STRIP: NEGATIVE /UL
SP GR UR: 1.02 (ref 1–1.03)
UROBILINOGEN UR QL: ABNORMAL

## 2017-12-14 PROCEDURE — 81003 URINALYSIS AUTO W/O SCOPE: CPT | Performed by: UROLOGY

## 2017-12-14 PROCEDURE — 99213 OFFICE O/P EST LOW 20 MIN: CPT | Performed by: UROLOGY

## 2017-12-14 PROCEDURE — 74000 HC ABDOMEN KUB: CPT

## 2017-12-14 NOTE — PROGRESS NOTES
Subjective    Mr. Durán is 67 y.o. male    CHIEF COMPLAINT: Stones    The patient was back today for follow-up of left ureteral and renal stones he is been totally asymptomatic basically since we seen him in 1 little day of minimal discomfort and some blood in pretty much resolved he has not passed anything as far as he is aware but otherwise he is having no symptoms.    Again no flank pain or fever his urine today is clear    On his KUB has bilateral renal calculi but I do not see anything at all over the course the left ureter    KUB independent review    A KUB is available for me to review today.  The image is inspected for a bowel gas pattern and the general bone structure of the spine and pelvis. The kidneys are then inspected closely.  Renal outline is noted if identifiable. The kidney, collecting system, and anticipated path of the ureter are examined for calcifications including those in the true pelvis.  This film reveals:    On the right there is a single renal stone measuring 5 mm.    On the left there is a single renal stone measuring 9 mm.      History of Present Illness      The following portions of the patient's history were reviewed and updated as appropriate: allergies, current medications, past family history, past medical history, past social history, past surgical history and problem list.    Review of Systems   Constitutional: Negative.  Negative for chills and fever.   Gastrointestinal: Negative for abdominal distention, abdominal pain, anal bleeding, blood in stool and nausea.   Genitourinary: Negative for difficulty urinating, dysuria, flank pain, frequency, hematuria and urgency.   Psychiatric/Behavioral: Negative.  Negative for agitation and confusion.         Current Outpatient Prescriptions:   •  allopurinol (ZYLOPRIM) 300 MG tablet, Take 300 mg by mouth Daily., Disp: , Rfl: 3  •  amLODIPine (NORVASC) 5 MG tablet, Take 5 mg by mouth Daily., Disp: , Rfl: 5  •  atenolol (TENORMIN) 50 MG  "tablet, Take 50 mg by mouth Daily., Disp: , Rfl: 3  •  meloxicam (MOBIC) 7.5 MG tablet, Take 7.5 mg by mouth Daily. Stopped prior to surgery on 6/1/17, Disp: , Rfl:   •  Multiple Vitamins-Minerals (MULTIVITAMIN ADULT PO), Take 1 capsule by mouth Daily., Disp: , Rfl:   •  tamsulosin (FLOMAX) 0.4 MG capsule 24 hr capsule, Take 1 capsule by mouth Daily., Disp: , Rfl: 5    Past Medical History:   Diagnosis Date   • Arthritis     gout   • BPH with urinary obstruction    • Calculus of kidney    • Calculus of ureter    • Hearing loss    • Hypertension    • Microscopic hematuria    • Nocturia        Past Surgical History:   Procedure Laterality Date   • CARPAL TUNNEL RELEASE     • EXTRACORPOREAL SHOCK WAVE LITHOTRIPSY (ESWL) Left 3/1/2017    Procedure: LEFT EXTRACORPOREAL SHOCKWAVE LITHOTRIPSY;  Surgeon: Emanuel Franco MD;  Location: [facility] OR;  Service:    • EXTRACORPOREAL SHOCK WAVE LITHOTRIPSY (ESWL) Left 6/14/2017    Procedure: EXTRACORPOREAL SHOCKWAVE LITHOTRIPSY;  Surgeon: Emanuel Franco MD;  Location: [facility] OR;  Service:    • EXTRACORPOREAL SHOCKWAVE LITHOTRIPSY (ESWL), STENT INSERTION/REMOVAL     • HEMORRHOIDECTOMY     • KNEE SURGERY         Social History     Social History   • Marital status:      Spouse name: N/A   • Number of children: N/A   • Years of education: N/A     Social History Main Topics   • Smoking status: Former Smoker     Types: Pipe     Quit date: 1/1/1973   • Smokeless tobacco: Never Used      Comment: only a pipe smoker and quit many years ago   • Alcohol use No   • Drug use: No   • Sexual activity: Defer     Other Topics Concern   • None     Social History Narrative       Family History   Problem Relation Age of Onset   • No Known Problems Father    • No Known Problems Mother        Objective    Temp 98.5 °F (36.9 °C)  Ht 188 cm (74\")  Wt 120 kg (265 lb 9.6 oz)  BMI 34.1 kg/m2    Physical Exam      Office Visit on 11/13/2017   Component Date Value Ref Range Status   • Color " 11/13/2017 Yellow  Yellow, Straw, Dark Yellow, Adrianne Final   • Clarity, UA 11/13/2017 Clear  Clear Final   • Glucose, UA 11/13/2017 Negative  Negative, 1000 mg/dL (3+) mg/dL Final   • Bilirubin 11/13/2017 Negative  Negative Final   • Ketones, UA 11/13/2017 Negative  Negative Final   • Specific Gravity  11/13/2017 1.005  1.005 - 1.030 Final   • Blood, UA 11/13/2017 Large* Negative Final   • pH, Urine 11/13/2017 6.0  5.0 - 8.0 Final   • Protein, POC 11/13/2017 Trace* Negative mg/dL Final   • Urobilinogen, UA 11/13/2017 Normal  Normal Final   • Leukocytes 11/13/2017 Negative  Negative Final   • Nitrite, UA 11/13/2017 Negative  Negative Final       Results for orders placed or performed in visit on 12/14/17   POC Urinalysis Dipstick, Automated   Result Value Ref Range    Color Yellow Yellow, Straw, Dark Yellow, Adrianne    Clarity, UA Clear Clear    Glucose, UA Negative Negative, 1000 mg/dL (3+) mg/dL    Bilirubin Negative Negative    Ketones, UA Negative Negative    Specific Gravity  1.025 1.005 - 1.030    Blood, UA Negative Negative    pH, Urine 7.0 5.0 - 8.0    Protein, POC Negative Negative mg/dL    Urobilinogen, UA 1 E.U./dL  (A) Normal    Leukocytes Negative Negative    Nitrite, UA Negative Negative       Assessment and Plan    Diagnoses and all orders for this visit:    Renal stone  -     POC Urinalysis Dipstick, Automated  -     XR Abdomen KUB; Future    Benign prostatic hyperplasia with lower urinary tract symptoms, symptom details unspecified    Ureteral stone    Hydronephrosis with renal and ureteral calculus obstruction  -     US Renal Bilateral; Future  Plan--I discussed the patient's situation with him again we see no obvious calculi in his ureter he is asymptomatic his urine is clear somewhat ago ahead and seen back again in about 6 weeks with an ultrasound and a KUB B starts having pain fever etc. he will let me know prior to that

## 2018-01-26 ENCOUNTER — HOSPITAL ENCOUNTER (OUTPATIENT)
Dept: ULTRASOUND IMAGING | Facility: HOSPITAL | Age: 68
Discharge: HOME | End: 2018-01-26
Attending: UROLOGY | Admitting: UROLOGY

## 2018-01-26 ENCOUNTER — OFFICE VISIT (OUTPATIENT)
Dept: UROLOGY | Facility: CLINIC | Age: 68
End: 2018-01-26

## 2018-01-26 VITALS
SYSTOLIC BLOOD PRESSURE: 160 MMHG | TEMPERATURE: 98.4 F | WEIGHT: 270.6 LBS | HEIGHT: 74 IN | DIASTOLIC BLOOD PRESSURE: 86 MMHG | BODY MASS INDEX: 34.73 KG/M2

## 2018-01-26 DIAGNOSIS — R35.1 NOCTURIA: ICD-10-CM

## 2018-01-26 DIAGNOSIS — N13.2 HYDRONEPHROSIS WITH RENAL AND URETERAL CALCULUS OBSTRUCTION: ICD-10-CM

## 2018-01-26 DIAGNOSIS — N40.1 BENIGN PROSTATIC HYPERPLASIA WITH LOWER URINARY TRACT SYMPTOMS, SYMPTOM DETAILS UNSPECIFIED: ICD-10-CM

## 2018-01-26 DIAGNOSIS — N13.2 HYDRONEPHROSIS WITH RENAL AND URETERAL CALCULUS OBSTRUCTION: Primary | ICD-10-CM

## 2018-01-26 DIAGNOSIS — N28.1 RENAL CYST: ICD-10-CM

## 2018-01-26 DIAGNOSIS — N20.0 CALCULUS OF KIDNEY: ICD-10-CM

## 2018-01-26 LAB
BILIRUB BLD-MCNC: NEGATIVE MG/DL
CLARITY, POC: CLEAR
COLOR UR: YELLOW
GLUCOSE UR STRIP-MCNC: NEGATIVE MG/DL
KETONES UR QL: NEGATIVE
LEUKOCYTE EST, POC: NEGATIVE
NITRITE UR-MCNC: NEGATIVE MG/ML
PH UR: 7.5 [PH] (ref 5–8)
PROT UR STRIP-MCNC: ABNORMAL MG/DL
RBC # UR STRIP: ABNORMAL /UL
SP GR UR: 1.02 (ref 1–1.03)
UROBILINOGEN UR QL: ABNORMAL

## 2018-01-26 PROCEDURE — 81003 URINALYSIS AUTO W/O SCOPE: CPT | Performed by: UROLOGY

## 2018-01-26 PROCEDURE — 99213 OFFICE O/P EST LOW 20 MIN: CPT | Performed by: UROLOGY

## 2018-01-26 PROCEDURE — 76775 US EXAM ABDO BACK WALL LIM: CPT

## 2018-01-26 NOTE — PROGRESS NOTES
Subjective    Mr. Durán is 67 y.o. male    CHIEF COMPLAINT: Stone    The patient comes back today for follow-up of kidney stones since we have last seen him his been totally asymptomatic he denies any flank pain there is no gross hematuria there is no burning stinging or symptoms of stones whatsoever he did get an ultrasound today which does show a cyst of the left kidney but no hydronephrosis    Renal ultrasound independent review    The renal ultrasound is available for me to review.  Treatment recommendations require an independent review.  This film has been reviewed by the radiologist to determine any non urologic abnormalities that are presents.  However, I very closely inspected the kidneys for size, symmetry, contour, parenchymal thickness, perinephric reaction, presence of calcifications, and intrarenal dilation of the collecting system.       The right kidney appears abnormal on this ultrasound.     Normal parenchymal thickness, No hydronephrosis, No renal cysts and Single renal stone measuring 4 mm    The left kidney appears abnormal on this ultrasound.     Normal parenchymal thickness, No hydronephrosis, No hydroureter, No renal masses, Single renal cyst measuring 20 mm and Mulltiple renal stones measuring 5    The bladder appears normal on thisultrsaound.  The bladder appears normal in thickness.  There no masses or stones seen on this exam.      History of Present Illness      The following portions of the patient's history were reviewed and updated as appropriate: allergies, current medications, past family history, past medical history, past social history, past surgical history and problem list.    Review of Systems   Constitutional: Negative.  Negative for chills and fever.   Gastrointestinal: Negative for abdominal distention, abdominal pain, anal bleeding, blood in stool and nausea.   Genitourinary: Positive for urgency. Negative for difficulty urinating, dysuria, flank pain, frequency and  hematuria.   Psychiatric/Behavioral: Negative.  Negative for agitation and confusion.         Current Outpatient Prescriptions:   •  allopurinol (ZYLOPRIM) 300 MG tablet, Take 300 mg by mouth Daily., Disp: , Rfl: 3  •  amLODIPine (NORVASC) 5 MG tablet, Take 5 mg by mouth Daily., Disp: , Rfl: 5  •  atenolol (TENORMIN) 50 MG tablet, Take 50 mg by mouth Daily., Disp: , Rfl: 3  •  meloxicam (MOBIC) 7.5 MG tablet, Take 7.5 mg by mouth Daily. Stopped prior to surgery on 6/1/17, Disp: , Rfl:   •  Multiple Vitamins-Minerals (MULTIVITAMIN ADULT PO), Take 1 capsule by mouth Daily., Disp: , Rfl:   •  tamsulosin (FLOMAX) 0.4 MG capsule 24 hr capsule, Take 1 capsule by mouth Daily., Disp: , Rfl: 5    Past Medical History:   Diagnosis Date   • Arthritis     gout   • BPH with urinary obstruction    • Calculus of kidney    • Calculus of ureter    • Hearing loss    • Hypertension    • Microscopic hematuria    • Nocturia        Past Surgical History:   Procedure Laterality Date   • CARPAL TUNNEL RELEASE     • EXTRACORPOREAL SHOCK WAVE LITHOTRIPSY (ESWL) Left 3/1/2017    Procedure: LEFT EXTRACORPOREAL SHOCKWAVE LITHOTRIPSY;  Surgeon: Emanuel Franco MD;  Location: [facility] OR;  Service:    • EXTRACORPOREAL SHOCK WAVE LITHOTRIPSY (ESWL) Left 6/14/2017    Procedure: EXTRACORPOREAL SHOCKWAVE LITHOTRIPSY;  Surgeon: Emanuel Franco MD;  Location: [facility] OR;  Service:    • EXTRACORPOREAL SHOCKWAVE LITHOTRIPSY (ESWL), STENT INSERTION/REMOVAL     • HEMORRHOIDECTOMY     • KNEE SURGERY         Social History     Social History   • Marital status:      Spouse name: N/A   • Number of children: N/A   • Years of education: N/A     Social History Main Topics   • Smoking status: Former Smoker     Types: Pipe     Quit date: 1/1/1973   • Smokeless tobacco: Never Used      Comment: only a pipe smoker and quit many years ago   • Alcohol use No   • Drug use: No   • Sexual activity: Defer     Other Topics Concern   • None     Social History  "Narrative       Family History   Problem Relation Age of Onset   • No Known Problems Father    • No Known Problems Mother        Objective    /86  Temp 98.4 °F (36.9 °C)  Ht 188 cm (74\")  Wt 123 kg (270 lb 9.6 oz)  BMI 34.74 kg/m2    Physical Exam      Office Visit on 12/14/2017   Component Date Value Ref Range Status   • Color 12/14/2017 Yellow  Yellow, Straw, Dark Yellow, Adrianne Final   • Clarity, UA 12/14/2017 Clear  Clear Final   • Glucose, UA 12/14/2017 Negative  Negative, 1000 mg/dL (3+) mg/dL Final   • Bilirubin 12/14/2017 Negative  Negative Final   • Ketones, UA 12/14/2017 Negative  Negative Final   • Specific Gravity  12/14/2017 1.025  1.005 - 1.030 Final   • Blood, UA 12/14/2017 Negative  Negative Final   • pH, Urine 12/14/2017 7.0  5.0 - 8.0 Final   • Protein, POC 12/14/2017 Negative  Negative mg/dL Final   • Urobilinogen, UA 12/14/2017 1 E.U./dL * Normal Final   • Leukocytes 12/14/2017 Negative  Negative Final   • Nitrite, UA 12/14/2017 Negative  Negative Final       Results for orders placed or performed in visit on 01/26/18   POC Urinalysis Dipstick, Automated   Result Value Ref Range    Color Yellow Yellow, Straw, Dark Yellow, Adrianne    Clarity, UA Clear Clear    Glucose, UA Negative Negative, 1000 mg/dL (3+) mg/dL    Bilirubin Negative Negative    Ketones, UA Negative Negative    Specific Gravity  1.020 1.005 - 1.030    Blood, UA Large (A) Negative    pH, Urine 7.5 5.0 - 8.0    Protein, POC Trace (A) Negative mg/dL    Urobilinogen, UA 1 E.U./dL  (A) Normal    Leukocytes Negative Negative    Nitrite, UA Negative Negative     Microscopic Urinalysis  I inspected the urine myself based on the clinical situation including the dipstick urine. The urine is spun in a centrifuge for three minutes. The spun urine shows 3-6 rbc/hpf, 0-2 wbc/hpf, 0-2 epi/hpf, negative bacteria, negative crystals, and negative casts.     Assessment and Plan    Diagnoses and all orders for this visit:    Hydronephrosis " with renal and ureteral calculus obstruction  -     POC Urinalysis Dipstick, Automated    Nocturia    Renal cyst    Calculus of kidney  -     XR Abdomen KUB; Future    Benign prostatic hyperplasia with lower urinary tract symptoms, symptom details unspecified  -     PSA DIAGNOSTIC; Future  Plan--the patient seems doing well there is no evidence of any hydronephrosis his last KUB shows no renal artery ureteral calculi rhythm.    On was seen back again in 6 months time we will check a KUB then.    I also has BPH we will get a PSA then again if he has any symptoms or pain prior then he will call.    I did discussed a renal cyst which appears to be benign

## 2018-01-31 ENCOUNTER — RESULTS ENCOUNTER (OUTPATIENT)
Dept: UROLOGY | Facility: CLINIC | Age: 68
End: 2018-01-31

## 2018-01-31 DIAGNOSIS — N40.1 BENIGN PROSTATIC HYPERPLASIA WITH LOWER URINARY TRACT SYMPTOMS, SYMPTOM DETAILS UNSPECIFIED: ICD-10-CM

## 2018-02-19 DIAGNOSIS — N13.8 BPH WITH OBSTRUCTION/LOWER URINARY TRACT SYMPTOMS: Primary | ICD-10-CM

## 2018-02-19 DIAGNOSIS — N40.1 BPH WITH OBSTRUCTION/LOWER URINARY TRACT SYMPTOMS: Primary | ICD-10-CM

## 2018-02-19 RX ORDER — TAMSULOSIN HYDROCHLORIDE 0.4 MG/1
CAPSULE ORAL
Qty: 90 CAPSULE | Refills: 5 | Status: SHIPPED | OUTPATIENT
Start: 2018-02-19 | End: 2018-07-24 | Stop reason: SDUPTHER

## 2018-07-23 LAB — PSA SERPL-MCNC: 0.61 NG/ML (ref 0–4)

## 2018-07-24 ENCOUNTER — OFFICE VISIT (OUTPATIENT)
Dept: UROLOGY | Facility: CLINIC | Age: 68
End: 2018-07-24

## 2018-07-24 ENCOUNTER — HOSPITAL ENCOUNTER (OUTPATIENT)
Dept: GENERAL RADIOLOGY | Facility: HOSPITAL | Age: 68
Discharge: HOME | End: 2018-07-24
Attending: UROLOGY | Admitting: UROLOGY

## 2018-07-24 VITALS
DIASTOLIC BLOOD PRESSURE: 70 MMHG | SYSTOLIC BLOOD PRESSURE: 140 MMHG | HEIGHT: 72 IN | BODY MASS INDEX: 35.89 KG/M2 | WEIGHT: 265 LBS

## 2018-07-24 DIAGNOSIS — N20.0 CALCULUS OF KIDNEY: ICD-10-CM

## 2018-07-24 DIAGNOSIS — N13.8 BPH WITH OBSTRUCTION/LOWER URINARY TRACT SYMPTOMS: Primary | ICD-10-CM

## 2018-07-24 DIAGNOSIS — N20.1 URETERAL STONE: ICD-10-CM

## 2018-07-24 DIAGNOSIS — N40.1 BPH WITH OBSTRUCTION/LOWER URINARY TRACT SYMPTOMS: Primary | ICD-10-CM

## 2018-07-24 LAB
BILIRUB BLD-MCNC: ABNORMAL MG/DL
CLARITY, POC: CLEAR
COLOR UR: YELLOW
GLUCOSE UR STRIP-MCNC: NEGATIVE MG/DL
KETONES UR QL: ABNORMAL
LEUKOCYTE EST, POC: NEGATIVE
NITRITE UR-MCNC: NEGATIVE MG/ML
PH UR: 7 [PH] (ref 5–8)
PROT UR STRIP-MCNC: ABNORMAL MG/DL
RBC # UR STRIP: ABNORMAL /UL
SP GR UR: 1.02 (ref 1–1.03)
UROBILINOGEN UR QL: ABNORMAL

## 2018-07-24 PROCEDURE — 99213 OFFICE O/P EST LOW 20 MIN: CPT | Performed by: UROLOGY

## 2018-07-24 PROCEDURE — 74018 RADEX ABDOMEN 1 VIEW: CPT

## 2018-07-24 PROCEDURE — 81001 URINALYSIS AUTO W/SCOPE: CPT | Performed by: UROLOGY

## 2018-07-24 RX ORDER — TAMSULOSIN HYDROCHLORIDE 0.4 MG/1
1 CAPSULE ORAL DAILY
Qty: 90 CAPSULE | Refills: 5 | Status: SHIPPED | OUTPATIENT
Start: 2018-07-24 | End: 2019-03-12 | Stop reason: SDUPTHER

## 2018-07-24 NOTE — PROGRESS NOTES
Subjective    Mr. Durán is 68 y.o. male    CHIEF COMPLAINT: BPH    The patient comes in today for follow-up of BPH clinically he is doing well as a way score today is 8 he does take Flomax he is not had any further gross hematuria is had no flank pain no real significant change in urgency frequency some mild experiences with that on occasion he is on does have a recent PSA that is less than 1.  No urinary tract infections as    I also has a history of stones he is had no symptoms since we seen him again no gross materia no flank pain his KUB shows a small collection of stones left lower pole pre-much stable    KUB independent review    A KUB is available for me to review today.  The image is inspected for a bowel gas pattern and the general bone structure of the spine and pelvis. The kidneys are then inspected closely.  Renal outline is noted if identifiable. The kidney, collecting system, and anticipated path of the ureter are examined for calcifications including those in the true pelvis.  This film reveals:    On the right there is a single renal stone measuring 3 mm.    On the left there are multiple renal stones. 5mm small cluster of stones all broken up in the left lower pole.  History of Present Illness      The following portions of the patient's history were reviewed and updated as appropriate: allergies, current medications, past family history, past medical history, past social history, past surgical history and problem list.    Review of Systems   Constitutional: Negative.    Gastrointestinal: Negative for abdominal distention, abdominal pain, blood in stool and nausea.   Genitourinary: Positive for frequency and urgency. Negative for difficulty urinating, dysuria, flank pain and hematuria.   Psychiatric/Behavioral: Negative.  Negative for agitation and confusion.   All other systems reviewed and are negative.        Current Outpatient Prescriptions:   •  allopurinol (ZYLOPRIM) 300 MG tablet, Take 300  "mg by mouth Daily., Disp: , Rfl: 3  •  amLODIPine (NORVASC) 5 MG tablet, Take 5 mg by mouth Daily., Disp: , Rfl: 5  •  atenolol (TENORMIN) 50 MG tablet, Take 50 mg by mouth Daily., Disp: , Rfl: 3  •  meloxicam (MOBIC) 7.5 MG tablet, Take 7.5 mg by mouth Daily. Stopped prior to surgery on 6/1/17, Disp: , Rfl:   •  Multiple Vitamins-Minerals (MULTIVITAMIN ADULT PO), Take 1 capsule by mouth Daily., Disp: , Rfl:   •  tamsulosin (FLOMAX) 0.4 MG capsule 24 hr capsule, Take 1 capsule by mouth Daily., Disp: 90 capsule, Rfl: 5    Past Medical History:   Diagnosis Date   • Arthritis     gout   • BPH with urinary obstruction    • Calculus of kidney    • Calculus of ureter    • Hearing loss    • Hypertension    • Microscopic hematuria    • Nocturia        Past Surgical History:   Procedure Laterality Date   • CARPAL TUNNEL RELEASE     • EXTRACORPOREAL SHOCK WAVE LITHOTRIPSY (ESWL) Left 3/1/2017    Procedure: LEFT EXTRACORPOREAL SHOCKWAVE LITHOTRIPSY;  Surgeon: Emanuel Franco MD;  Location: [facility] OR;  Service:    • EXTRACORPOREAL SHOCK WAVE LITHOTRIPSY (ESWL) Left 6/14/2017    Procedure: EXTRACORPOREAL SHOCKWAVE LITHOTRIPSY;  Surgeon: Emanuel Franco MD;  Location: [facility] OR;  Service:    • EXTRACORPOREAL SHOCKWAVE LITHOTRIPSY (ESWL), STENT INSERTION/REMOVAL     • HEMORRHOIDECTOMY     • KNEE SURGERY         Social History     Social History   • Marital status:      Social History Main Topics   • Smoking status: Former Smoker     Types: Pipe     Quit date: 1/1/1973   • Smokeless tobacco: Never Used      Comment: only a pipe smoker and quit many years ago   • Alcohol use No   • Drug use: No   • Sexual activity: Defer     Other Topics Concern   • Not on file       Family History   Problem Relation Age of Onset   • No Known Problems Father    • No Known Problems Mother        Objective    /70   Ht 182.9 cm (72\")   Wt 120 kg (265 lb)   BMI 35.94 kg/m²     Physical Exam      Results Encounter on 01/31/2018 "   Component Date Value Ref Range Status   • PSA 07/23/2018 0.609  0.000 - 4.000 ng/mL Final       Results for orders placed or performed in visit on 07/24/18   POC Urinalysis Dipstick   Result Value Ref Range    Color Yellow Yellow, Straw, Dark Yellow, Adrianne    Clarity, UA Clear Clear    Glucose, UA Negative Negative, 1000 mg/dL (3+) mg/dL    Bilirubin Small (1+) (A) Negative    Ketones, UA 15 mg/dL (A) Negative    Specific Gravity  1.020 1.005 - 1.030    Blood, UA Small (A) Negative    pH, Urine 7.0 5.0 - 8.0    Protein, POC 30 mg/dL (A) Negative mg/dL    Urobilinogen, UA 1 E.U./dL  (A) Normal    Leukocytes Negative Negative    Nitrite, UA Negative Negative       Assessment and Plan    Martin was seen today for follow-up.    Diagnoses and all orders for this visit:    BPH with obstruction/lower urinary tract symptoms  -     POC Urinalysis Dipstick  -     tamsulosin (FLOMAX) 0.4 MG capsule 24 hr capsule; Take 1 capsule by mouth Daily.  -     PSA DIAGNOSTIC; Future    Calculus of kidney  -     POC Urinalysis Dipstick  -     XR Abdomen KUB; Future    Ureteral stone  -     POC Urinalysis Dipstick    Plan--the patient is doing well from a BPH point review on when to go ahead and refill his Flomax for him and we will seen back again in a year with a PSA certainly call if he has problems.    I again discussed the stones with him hopefully these are small amount from a passable circumflex not recommend any treatment at this point in time.    Once again we will seen back in year with a KUB if he has troubles please call

## 2019-03-12 DIAGNOSIS — N13.8 BPH WITH OBSTRUCTION/LOWER URINARY TRACT SYMPTOMS: ICD-10-CM

## 2019-03-12 DIAGNOSIS — N40.1 BPH WITH OBSTRUCTION/LOWER URINARY TRACT SYMPTOMS: ICD-10-CM

## 2019-03-12 RX ORDER — TAMSULOSIN HYDROCHLORIDE 0.4 MG/1
CAPSULE ORAL
Qty: 90 CAPSULE | Refills: 5 | Status: SHIPPED | OUTPATIENT
Start: 2019-03-12 | End: 2020-06-12 | Stop reason: SDUPTHER

## 2019-07-24 ENCOUNTER — RESULTS ENCOUNTER (OUTPATIENT)
Dept: UROLOGY | Facility: CLINIC | Age: 69
End: 2019-07-24

## 2019-07-24 DIAGNOSIS — N40.1 BPH WITH OBSTRUCTION/LOWER URINARY TRACT SYMPTOMS: ICD-10-CM

## 2019-07-24 DIAGNOSIS — N13.8 BPH WITH OBSTRUCTION/LOWER URINARY TRACT SYMPTOMS: ICD-10-CM

## 2019-07-26 DIAGNOSIS — N40.1 BPH WITH OBSTRUCTION/LOWER URINARY TRACT SYMPTOMS: Primary | ICD-10-CM

## 2019-07-26 DIAGNOSIS — N13.8 BPH WITH OBSTRUCTION/LOWER URINARY TRACT SYMPTOMS: Primary | ICD-10-CM

## 2019-09-11 DIAGNOSIS — N40.1 BPH WITH OBSTRUCTION/LOWER URINARY TRACT SYMPTOMS: ICD-10-CM

## 2019-09-11 DIAGNOSIS — N13.8 BPH WITH OBSTRUCTION/LOWER URINARY TRACT SYMPTOMS: ICD-10-CM

## 2019-09-12 LAB — PSA SERPL-MCNC: 0.64 NG/ML (ref 0–4)

## 2019-09-16 NOTE — PROGRESS NOTES
Subjective    Mr. Durán is 69 y.o. male    Chief Complaint: BPH and Calculus of kidney    History of Present Illness   69-year-old male established patient previously followed by Dr. Franco here for multiple problems.  He has known enlarged prostate on tamsulosin.  He has kidney stones.  KUB today shows 1 cm left-sided stone and multiple other nonobstructing nephroliths.  He also complains of new problem of erectile dysfunction.  Quality painless.  Severity loss of function.  Timing constant.  Context never treated before.  PSA on 9/12/2019 was normal 0.64    I spent time today reviewing and summarizing old records last urology clinic visit with Dr. Franco 7/24/18.    I independently visualized and reviewed the patient's prior imaging studies today in clinic and discussed the imaging findings with the patient.         The following portions of the patient's history were reviewed and updated as appropriate: allergies, current medications, past family history, past medical history, past social history, past surgical history and problem list.    Review of Systems   Constitutional: Negative for chills and fever.   Gastrointestinal: Negative for abdominal pain, anal bleeding and blood in stool.   Genitourinary: Negative for dysuria, frequency, hematuria and urgency.   All other systems reviewed and are negative.        Current Outpatient Medications:   •  allopurinol (ZYLOPRIM) 300 MG tablet, Take 300 mg by mouth Daily., Disp: , Rfl: 3  •  amLODIPine (NORVASC) 5 MG tablet, Take 5 mg by mouth Daily., Disp: , Rfl: 5  •  atenolol (TENORMIN) 50 MG tablet, Take 50 mg by mouth Daily., Disp: , Rfl: 3  •  meloxicam (MOBIC) 7.5 MG tablet, Take 7.5 mg by mouth Daily. Stopped prior to surgery on 6/1/17, Disp: , Rfl:   •  Multiple Vitamins-Minerals (MULTIVITAMIN ADULT PO), Take 1 capsule by mouth Daily., Disp: , Rfl:   •  tamsulosin (FLOMAX) 0.4 MG capsule 24 hr capsule, TAKE ONE CAPSULE BY MOUTH EVERY DAY, Disp: 90 capsule, Rfl:  "5    Past Medical History:   Diagnosis Date   • Arthritis     gout   • BPH with urinary obstruction    • Calculus of kidney    • Calculus of ureter    • Hearing loss    • Hypertension    • Microscopic hematuria    • Nocturia        Past Surgical History:   Procedure Laterality Date   • CARPAL TUNNEL RELEASE     • EXTRACORPOREAL SHOCK WAVE LITHOTRIPSY (ESWL) Left 3/1/2017    Procedure: LEFT EXTRACORPOREAL SHOCKWAVE LITHOTRIPSY;  Surgeon: Emanuel Franco MD;  Location: [facility] OR;  Service:    • EXTRACORPOREAL SHOCK WAVE LITHOTRIPSY (ESWL) Left 2017    Procedure: EXTRACORPOREAL SHOCKWAVE LITHOTRIPSY;  Surgeon: Emanuel Franco MD;  Location: [facility] OR;  Service:    • EXTRACORPOREAL SHOCKWAVE LITHOTRIPSY (ESWL), STENT INSERTION/REMOVAL     • HEMORRHOIDECTOMY     • KNEE SURGERY         Social History     Socioeconomic History   • Marital status:      Spouse name: Not on file   • Number of children: Not on file   • Years of education: Not on file   • Highest education level: Not on file   Tobacco Use   • Smoking status: Former Smoker     Types: Pipe     Last attempt to quit: 1973     Years since quittin.7   • Smokeless tobacco: Never Used   • Tobacco comment: only a pipe smoker and quit many years ago   Substance and Sexual Activity   • Alcohol use: No   • Drug use: No   • Sexual activity: Defer       Family History   Problem Relation Age of Onset   • No Known Problems Father    • No Known Problems Mother        Objective    Temp 97 °F (36.1 °C)   Ht 188 cm (74\")   Wt 119 kg (263 lb 3.2 oz)   BMI 33.79 kg/m²     Physical Exam  Constitutional: Well nourished, Well developed; No apparent distress.  His vital signs are reviewed  Psychiatric: Appropriate affect; Alert and oriented  Eyes: Unremarkable  Musculoskeletal: Normal gait and station  GI: Abdomen is soft, non-tender  Respiratory: No distress; Unlabored movement; No accessory musculature needed with symmetric movements  Skin: No pallor or " diaphoresis  ; Penis and testicles are normal; Prostate 40 mL without nodule      Results for orders placed or performed in visit on 09/11/19   PSA Diagnostic   Result Value Ref Range    PSA 0.642 0.000 - 4.000 ng/mL     Patient's Body mass index is 33.79 kg/m². BMI is above normal parameters. Recommendations include: educational material.    International Prostate Symptom Score  The following is posted based on patient questionnaire answers:  0 - not at all    1-7 mild symptoms  1- Less than one time in five  8-19 moderate symptoms  2 -Less than half the time  20-35 severe symptoms  3 - About half the time  4 - More than half the time  5 - Almost always     For following sections:  Incomplete Emptying: - How often have you had the sensation  of not emptying your bladder completely after you finished urinating?  2  Frequency: -How often have you had to urinate again less than   two hours after you finished urinating?      3  Intermittency: -How often have you found you stopped and started again  Several times when you urinate?       2  Urgency: -How often do you find it difficult to postpone urination?             2  Weak stream: - How often have you had a weak urinary stream?             1  Straining: - How often have you had to push or strain to begin  Urination?          0  Sleeping: -How many times did you most typically get up to urinate   From the time you went to bed at night until the time you got up in the   1  Morning          Total `  11    Quality of Life  How would you feel if you had to live with your urinary condition the way   2  It is now, no better, no worse for the rest of your life?    Where: 0=delighted; 1= pleased, 2= mostly satisfied, 3= mixed, 4 = mostly  Dissatisfied, 5= Unhappy, 6 = terrible    Assessment and Plan    Diagnoses and all orders for this visit:    BPH with obstruction/lower urinary tract symptoms  -     POC Urinalysis Dipstick, Multipro  -     PSA DIAGNOSTIC;  Future    Calculus of kidney  -     Case Request; Standing  -     sodium chloride 0.9 % infusion  -     ceFAZolin (ANCEF) 2 g in sodium chloride 0.9 % 100 mL IVPB  -     Urinalysis without microscopic (no culture) - Urine, Clean Catch; Future  -     Urinalysis With Culture If Indicated -; Future  -     Case Request    Erectile disorder due to medical condition in male  -     sildenafil (VIAGRA) 100 MG tablet; 1/2 to 1 tab by mouth 1 hour prior to intercourse    Other orders  -     Follow Anesthesia Guidelines / Standing Orders; Future  -     Obtain Informed Consent  -     Provide NPO Instructions to Patient; Future  -     Follow Anesthesia Guidelines / Standing Orders; Standing  -     Verify NPO Status; Standing  -     SCD (Sequential Compression Device) - Place on Patient in Pre-Op; Standing  -     Verify/Perform Chlorhexidine Skin Prep; Standing  -     Verify / Perform Chlorhexidine Skin Prep if Indicated (If Not Already Completed); Standing  -     ECG 12 Lead; Standing  -     XR Abdomen KUB; Standing      1.  Enlarged prostate- continue tamsulosin.  2.  Nephrolithiasis, largest 1 cm on the left.  Patient desires to schedule left ESWL.  Discussed risks of procedure including infection, bleeding, need for additional procedures, inability to break up and/or pass any and/or all stone.  Complications of anesthesia.  He voiced understanding and provided informed consent to proceed next month with left ESWL.  3.  Erectile dysfunction.  He desires trial of PDE inhibitor therapy.  He understands no nitrates.

## 2019-09-19 ENCOUNTER — OFFICE VISIT (OUTPATIENT)
Dept: UROLOGY | Facility: CLINIC | Age: 69
End: 2019-09-19

## 2019-09-19 ENCOUNTER — RESULTS ENCOUNTER (OUTPATIENT)
Dept: UROLOGY | Facility: CLINIC | Age: 69
End: 2019-09-19

## 2019-09-19 ENCOUNTER — HOSPITAL ENCOUNTER (OUTPATIENT)
Dept: GENERAL RADIOLOGY | Facility: HOSPITAL | Age: 69
Discharge: HOME | End: 2019-09-19
Admitting: UROLOGY

## 2019-09-19 VITALS — BODY MASS INDEX: 33.78 KG/M2 | TEMPERATURE: 97 F | WEIGHT: 263.2 LBS | HEIGHT: 74 IN

## 2019-09-19 DIAGNOSIS — N20.1 URETERAL STONE: ICD-10-CM

## 2019-09-19 DIAGNOSIS — N20.0 CALCULUS OF KIDNEY: ICD-10-CM

## 2019-09-19 DIAGNOSIS — N13.8 BPH WITH OBSTRUCTION/LOWER URINARY TRACT SYMPTOMS: Primary | ICD-10-CM

## 2019-09-19 DIAGNOSIS — N40.1 BPH WITH OBSTRUCTION/LOWER URINARY TRACT SYMPTOMS: Primary | ICD-10-CM

## 2019-09-19 DIAGNOSIS — N52.1 ERECTILE DISORDER DUE TO MEDICAL CONDITION IN MALE: ICD-10-CM

## 2019-09-19 DIAGNOSIS — N20.1 URETERAL STONE: Primary | ICD-10-CM

## 2019-09-19 LAB
BILIRUB BLD-MCNC: NEGATIVE MG/DL
CLARITY, POC: CLEAR
COLOR UR: YELLOW
GLUCOSE UR STRIP-MCNC: NEGATIVE MG/DL
KETONES UR QL: NEGATIVE
LEUKOCYTE EST, POC: NEGATIVE
NITRITE UR-MCNC: NEGATIVE MG/ML
PH UR: 6 [PH] (ref 5–8)
PROT UR STRIP-MCNC: NEGATIVE MG/DL
RBC # UR STRIP: ABNORMAL /UL
SP GR UR: 1.02 (ref 1–1.03)
UROBILINOGEN UR QL: NORMAL

## 2019-09-19 PROCEDURE — 81001 URINALYSIS AUTO W/SCOPE: CPT | Performed by: UROLOGY

## 2019-09-19 PROCEDURE — 74018 RADEX ABDOMEN 1 VIEW: CPT

## 2019-09-19 PROCEDURE — 99214 OFFICE O/P EST MOD 30 MIN: CPT | Performed by: UROLOGY

## 2019-09-19 RX ORDER — SODIUM CHLORIDE 9 MG/ML
100 INJECTION, SOLUTION INTRAVENOUS CONTINUOUS
Status: CANCELLED | OUTPATIENT
Start: 2019-09-19

## 2019-09-19 RX ORDER — SILDENAFIL 100 MG/1
TABLET, FILM COATED ORAL
Qty: 10 TABLET | Refills: 5 | Status: SHIPPED | OUTPATIENT
Start: 2019-09-19 | End: 2021-05-25

## 2019-09-19 NOTE — PATIENT INSTRUCTIONS

## 2019-09-25 DIAGNOSIS — N20.0 CALCULUS OF KIDNEY: Primary | ICD-10-CM

## 2019-10-14 ENCOUNTER — APPOINTMENT (OUTPATIENT)
Dept: PREADMISSION TESTING | Facility: HOSPITAL | Age: 69
End: 2019-10-14

## 2019-10-14 VITALS
WEIGHT: 264.55 LBS | SYSTOLIC BLOOD PRESSURE: 159 MMHG | OXYGEN SATURATION: 99 % | RESPIRATION RATE: 20 BRPM | DIASTOLIC BLOOD PRESSURE: 69 MMHG | HEART RATE: 86 BPM | HEIGHT: 74 IN | BODY MASS INDEX: 33.95 KG/M2

## 2019-10-14 DIAGNOSIS — N20.0 CALCULUS OF KIDNEY: ICD-10-CM

## 2019-10-14 LAB
ANION GAP SERPL CALCULATED.3IONS-SCNC: 9 MMOL/L (ref 5–15)
BACTERIA UR QL AUTO: ABNORMAL /HPF
BILIRUB UR QL STRIP: NEGATIVE
BUN BLD-MCNC: 16 MG/DL (ref 8–23)
BUN/CREAT SERPL: 17.2 (ref 7–25)
CALCIUM SPEC-SCNC: 9.6 MG/DL (ref 8.6–10.5)
CHLORIDE SERPL-SCNC: 102 MMOL/L (ref 98–107)
CLARITY UR: CLEAR
CO2 SERPL-SCNC: 30 MMOL/L (ref 22–29)
COLOR UR: ABNORMAL
CREAT BLD-MCNC: 0.93 MG/DL (ref 0.76–1.27)
DEPRECATED RDW RBC AUTO: 45.5 FL (ref 37–54)
ERYTHROCYTE [DISTWIDTH] IN BLOOD BY AUTOMATED COUNT: 13.4 % (ref 12.3–15.4)
GFR SERPL CREATININE-BSD FRML MDRD: 98 ML/MIN/1.73
GLUCOSE BLD-MCNC: 119 MG/DL (ref 65–99)
GLUCOSE UR STRIP-MCNC: NEGATIVE MG/DL
HCT VFR BLD AUTO: 39.1 % (ref 37.5–51)
HGB BLD-MCNC: 13.3 G/DL (ref 13–17.7)
HGB UR QL STRIP.AUTO: ABNORMAL
HYALINE CASTS UR QL AUTO: ABNORMAL /LPF
KETONES UR QL STRIP: NEGATIVE
LEUKOCYTE ESTERASE UR QL STRIP.AUTO: ABNORMAL
MCH RBC QN AUTO: 30.9 PG (ref 26.6–33)
MCHC RBC AUTO-ENTMCNC: 34 G/DL (ref 31.5–35.7)
MCV RBC AUTO: 90.7 FL (ref 79–97)
NITRITE UR QL STRIP: NEGATIVE
PH UR STRIP.AUTO: 7.5 [PH] (ref 5–8)
PLATELET # BLD AUTO: 128 10*3/MM3 (ref 140–450)
PMV BLD AUTO: 9.8 FL (ref 6–12)
POTASSIUM BLD-SCNC: 4.5 MMOL/L (ref 3.5–5.2)
PROT UR QL STRIP: ABNORMAL
RBC # BLD AUTO: 4.31 10*6/MM3 (ref 4.14–5.8)
RBC # UR: ABNORMAL /HPF
REF LAB TEST METHOD: ABNORMAL
SODIUM BLD-SCNC: 141 MMOL/L (ref 136–145)
SP GR UR STRIP: 1.02 (ref 1–1.03)
SQUAMOUS #/AREA URNS HPF: ABNORMAL /HPF
UROBILINOGEN UR QL STRIP: ABNORMAL
WBC NRBC COR # BLD: 3.03 10*3/MM3 (ref 3.4–10.8)
WBC UR QL AUTO: ABNORMAL /HPF

## 2019-10-14 PROCEDURE — 85027 COMPLETE CBC AUTOMATED: CPT | Performed by: UROLOGY

## 2019-10-14 PROCEDURE — 80048 BASIC METABOLIC PNL TOTAL CA: CPT | Performed by: UROLOGY

## 2019-10-14 PROCEDURE — 93005 ELECTROCARDIOGRAM TRACING: CPT | Performed by: UROLOGY

## 2019-10-14 PROCEDURE — 93010 ELECTROCARDIOGRAM REPORT: CPT | Performed by: INTERNAL MEDICINE

## 2019-10-14 PROCEDURE — 81001 URINALYSIS AUTO W/SCOPE: CPT | Performed by: UROLOGY

## 2019-10-14 PROCEDURE — 36415 COLL VENOUS BLD VENIPUNCTURE: CPT

## 2019-10-14 PROCEDURE — 87086 URINE CULTURE/COLONY COUNT: CPT | Performed by: UROLOGY

## 2019-10-15 LAB — BACTERIA SPEC AEROBE CULT: NO GROWTH

## 2019-10-28 ENCOUNTER — ANESTHESIA (OUTPATIENT)
Dept: PERIOP | Facility: HOSPITAL | Age: 69
End: 2019-10-28

## 2019-10-28 ENCOUNTER — APPOINTMENT (OUTPATIENT)
Dept: GENERAL RADIOLOGY | Facility: HOSPITAL | Age: 69
End: 2019-10-28

## 2019-10-28 ENCOUNTER — HOSPITAL ENCOUNTER (OUTPATIENT)
Facility: HOSPITAL | Age: 69
Setting detail: HOSPITAL OUTPATIENT SURGERY
Discharge: HOME | End: 2019-10-28
Attending: UROLOGY | Admitting: UROLOGY

## 2019-10-28 ENCOUNTER — ANESTHESIA EVENT (OUTPATIENT)
Dept: PERIOP | Facility: HOSPITAL | Age: 69
End: 2019-10-28

## 2019-10-28 VITALS
DIASTOLIC BLOOD PRESSURE: 65 MMHG | HEART RATE: 67 BPM | OXYGEN SATURATION: 98 % | SYSTOLIC BLOOD PRESSURE: 149 MMHG | TEMPERATURE: 98 F | RESPIRATION RATE: 18 BRPM

## 2019-10-28 DIAGNOSIS — N20.0 CALCULUS OF KIDNEY: ICD-10-CM

## 2019-10-28 PROCEDURE — 25010000002 DEXAMETHASONE PER 1 MG: Performed by: NURSE ANESTHETIST, CERTIFIED REGISTERED

## 2019-10-28 PROCEDURE — S0260 H&P FOR SURGERY: HCPCS | Performed by: UROLOGY

## 2019-10-28 PROCEDURE — 25010000002 PROPOFOL 10 MG/ML EMULSION: Performed by: NURSE ANESTHETIST, CERTIFIED REGISTERED

## 2019-10-28 PROCEDURE — 25010000002 KETOROLAC TROMETHAMINE PER 15 MG: Performed by: NURSE ANESTHETIST, CERTIFIED REGISTERED

## 2019-10-28 PROCEDURE — 25010000002 DEXAMETHASONE PER 1 MG: Performed by: ANESTHESIOLOGY

## 2019-10-28 PROCEDURE — 25010000002 FENTANYL CITRATE (PF) 100 MCG/2ML SOLUTION: Performed by: NURSE ANESTHETIST, CERTIFIED REGISTERED

## 2019-10-28 PROCEDURE — 50590 FRAGMENTING OF KIDNEY STONE: CPT | Performed by: UROLOGY

## 2019-10-28 PROCEDURE — 74018 RADEX ABDOMEN 1 VIEW: CPT

## 2019-10-28 PROCEDURE — 25010000002 ONDANSETRON PER 1 MG: Performed by: NURSE ANESTHETIST, CERTIFIED REGISTERED

## 2019-10-28 RX ORDER — SODIUM CHLORIDE 0.9 % (FLUSH) 0.9 %
3-10 SYRINGE (ML) INJECTION AS NEEDED
Status: DISCONTINUED | OUTPATIENT
Start: 2019-10-28 | End: 2019-10-28 | Stop reason: HOSPADM

## 2019-10-28 RX ORDER — DEXAMETHASONE SODIUM PHOSPHATE 4 MG/ML
INJECTION, SOLUTION INTRA-ARTICULAR; INTRALESIONAL; INTRAMUSCULAR; INTRAVENOUS; SOFT TISSUE AS NEEDED
Status: DISCONTINUED | OUTPATIENT
Start: 2019-10-28 | End: 2019-10-28 | Stop reason: SURG

## 2019-10-28 RX ORDER — MIDAZOLAM HYDROCHLORIDE 1 MG/ML
2 INJECTION INTRAMUSCULAR; INTRAVENOUS
Status: DISCONTINUED | OUTPATIENT
Start: 2019-10-28 | End: 2019-10-28 | Stop reason: HOSPADM

## 2019-10-28 RX ORDER — DEXAMETHASONE SODIUM PHOSPHATE 4 MG/ML
4 INJECTION, SOLUTION INTRA-ARTICULAR; INTRALESIONAL; INTRAMUSCULAR; INTRAVENOUS; SOFT TISSUE ONCE AS NEEDED
Status: COMPLETED | OUTPATIENT
Start: 2019-10-28 | End: 2019-10-28

## 2019-10-28 RX ORDER — LIDOCAINE HYDROCHLORIDE 20 MG/ML
INJECTION, SOLUTION INFILTRATION; PERINEURAL AS NEEDED
Status: DISCONTINUED | OUTPATIENT
Start: 2019-10-28 | End: 2019-10-28 | Stop reason: SURG

## 2019-10-28 RX ORDER — FENTANYL CITRATE 50 UG/ML
25 INJECTION, SOLUTION INTRAMUSCULAR; INTRAVENOUS AS NEEDED
Status: DISCONTINUED | OUTPATIENT
Start: 2019-10-28 | End: 2019-10-28 | Stop reason: HOSPADM

## 2019-10-28 RX ORDER — METOCLOPRAMIDE HYDROCHLORIDE 5 MG/ML
5 INJECTION INTRAMUSCULAR; INTRAVENOUS
Status: DISCONTINUED | OUTPATIENT
Start: 2019-10-28 | End: 2019-10-28 | Stop reason: HOSPADM

## 2019-10-28 RX ORDER — OXYCODONE AND ACETAMINOPHEN 7.5; 325 MG/1; MG/1
2 TABLET ORAL EVERY 4 HOURS PRN
Status: DISCONTINUED | OUTPATIENT
Start: 2019-10-28 | End: 2019-10-28 | Stop reason: HOSPADM

## 2019-10-28 RX ORDER — FENTANYL CITRATE 50 UG/ML
INJECTION, SOLUTION INTRAMUSCULAR; INTRAVENOUS AS NEEDED
Status: DISCONTINUED | OUTPATIENT
Start: 2019-10-28 | End: 2019-10-28 | Stop reason: SURG

## 2019-10-28 RX ORDER — BUPIVACAINE HCL/0.9 % NACL/PF 0.1 %
2 PLASTIC BAG, INJECTION (ML) EPIDURAL ONCE
Status: COMPLETED | OUTPATIENT
Start: 2019-10-28 | End: 2019-10-28

## 2019-10-28 RX ORDER — EPHEDRINE SULFATE 50 MG/ML
INJECTION INTRAVENOUS AS NEEDED
Status: DISCONTINUED | OUTPATIENT
Start: 2019-10-28 | End: 2019-10-28 | Stop reason: SURG

## 2019-10-28 RX ORDER — SODIUM CHLORIDE, SODIUM LACTATE, POTASSIUM CHLORIDE, CALCIUM CHLORIDE 600; 310; 30; 20 MG/100ML; MG/100ML; MG/100ML; MG/100ML
1000 INJECTION, SOLUTION INTRAVENOUS CONTINUOUS
Status: DISCONTINUED | OUTPATIENT
Start: 2019-10-28 | End: 2019-10-28 | Stop reason: HOSPADM

## 2019-10-28 RX ORDER — OXYCODONE AND ACETAMINOPHEN 10; 325 MG/1; MG/1
1 TABLET ORAL ONCE AS NEEDED
Status: DISCONTINUED | OUTPATIENT
Start: 2019-10-28 | End: 2019-10-28 | Stop reason: HOSPADM

## 2019-10-28 RX ORDER — PROPOFOL 10 MG/ML
VIAL (ML) INTRAVENOUS AS NEEDED
Status: DISCONTINUED | OUTPATIENT
Start: 2019-10-28 | End: 2019-10-28 | Stop reason: SURG

## 2019-10-28 RX ORDER — MIDAZOLAM HYDROCHLORIDE 1 MG/ML
1 INJECTION INTRAMUSCULAR; INTRAVENOUS
Status: DISCONTINUED | OUTPATIENT
Start: 2019-10-28 | End: 2019-10-28 | Stop reason: HOSPADM

## 2019-10-28 RX ORDER — ACETAMINOPHEN 500 MG
1000 TABLET ORAL ONCE
Status: COMPLETED | OUTPATIENT
Start: 2019-10-28 | End: 2019-10-28

## 2019-10-28 RX ORDER — KETOROLAC TROMETHAMINE 30 MG/ML
INJECTION, SOLUTION INTRAMUSCULAR; INTRAVENOUS AS NEEDED
Status: DISCONTINUED | OUTPATIENT
Start: 2019-10-28 | End: 2019-10-28 | Stop reason: SURG

## 2019-10-28 RX ORDER — NALOXONE HCL 0.4 MG/ML
0.4 VIAL (ML) INJECTION AS NEEDED
Status: DISCONTINUED | OUTPATIENT
Start: 2019-10-28 | End: 2019-10-28 | Stop reason: HOSPADM

## 2019-10-28 RX ORDER — SODIUM CHLORIDE 0.9 % (FLUSH) 0.9 %
3 SYRINGE (ML) INJECTION EVERY 12 HOURS SCHEDULED
Status: DISCONTINUED | OUTPATIENT
Start: 2019-10-28 | End: 2019-10-28 | Stop reason: HOSPADM

## 2019-10-28 RX ORDER — LABETALOL HYDROCHLORIDE 5 MG/ML
5 INJECTION, SOLUTION INTRAVENOUS
Status: DISCONTINUED | OUTPATIENT
Start: 2019-10-28 | End: 2019-10-28 | Stop reason: HOSPADM

## 2019-10-28 RX ORDER — SODIUM CHLORIDE 9 MG/ML
100 INJECTION, SOLUTION INTRAVENOUS CONTINUOUS
Status: DISCONTINUED | OUTPATIENT
Start: 2019-10-28 | End: 2019-10-28 | Stop reason: HOSPADM

## 2019-10-28 RX ORDER — SODIUM CHLORIDE 0.9 % (FLUSH) 0.9 %
3 SYRINGE (ML) INJECTION AS NEEDED
Status: DISCONTINUED | OUTPATIENT
Start: 2019-10-28 | End: 2019-10-28 | Stop reason: HOSPADM

## 2019-10-28 RX ORDER — IPRATROPIUM BROMIDE AND ALBUTEROL SULFATE 2.5; .5 MG/3ML; MG/3ML
3 SOLUTION RESPIRATORY (INHALATION) ONCE AS NEEDED
Status: DISCONTINUED | OUTPATIENT
Start: 2019-10-28 | End: 2019-10-28 | Stop reason: HOSPADM

## 2019-10-28 RX ORDER — TRAMADOL HYDROCHLORIDE 50 MG/1
50 TABLET ORAL EVERY 6 HOURS PRN
Qty: 15 TABLET | Refills: 0 | Status: SHIPPED | OUTPATIENT
Start: 2019-10-28 | End: 2021-05-25

## 2019-10-28 RX ORDER — GLYCOPYRROLATE 0.2 MG/ML
INJECTION INTRAMUSCULAR; INTRAVENOUS AS NEEDED
Status: DISCONTINUED | OUTPATIENT
Start: 2019-10-28 | End: 2019-10-28 | Stop reason: SURG

## 2019-10-28 RX ORDER — ONDANSETRON 2 MG/ML
4 INJECTION INTRAMUSCULAR; INTRAVENOUS ONCE AS NEEDED
Status: DISCONTINUED | OUTPATIENT
Start: 2019-10-28 | End: 2019-10-28 | Stop reason: HOSPADM

## 2019-10-28 RX ORDER — SODIUM CHLORIDE, SODIUM LACTATE, POTASSIUM CHLORIDE, CALCIUM CHLORIDE 600; 310; 30; 20 MG/100ML; MG/100ML; MG/100ML; MG/100ML
100 INJECTION, SOLUTION INTRAVENOUS CONTINUOUS
Status: DISCONTINUED | OUTPATIENT
Start: 2019-10-28 | End: 2019-10-28 | Stop reason: HOSPADM

## 2019-10-28 RX ORDER — ONDANSETRON 4 MG/1
4 TABLET, FILM COATED ORAL EVERY 6 HOURS PRN
Qty: 10 TABLET | Refills: 0 | Status: SHIPPED | OUTPATIENT
Start: 2019-10-28 | End: 2021-05-25

## 2019-10-28 RX ORDER — ONDANSETRON 2 MG/ML
INJECTION INTRAMUSCULAR; INTRAVENOUS AS NEEDED
Status: DISCONTINUED | OUTPATIENT
Start: 2019-10-28 | End: 2019-10-28 | Stop reason: SURG

## 2019-10-28 RX ADMIN — Medication 2 G: at 12:00

## 2019-10-28 RX ADMIN — GLYCOPYRROLATE 0.2 MG: 0.2 INJECTION, SOLUTION INTRAMUSCULAR; INTRAVENOUS at 11:56

## 2019-10-28 RX ADMIN — ACETAMINOPHEN 1000 MG: 500 TABLET, FILM COATED ORAL at 11:43

## 2019-10-28 RX ADMIN — DEXAMETHASONE SODIUM PHOSPHATE 8 MG: 4 INJECTION, SOLUTION INTRAMUSCULAR; INTRAVENOUS at 11:56

## 2019-10-28 RX ADMIN — FENTANYL CITRATE 100 MCG: 50 INJECTION, SOLUTION INTRAMUSCULAR; INTRAVENOUS at 11:56

## 2019-10-28 RX ADMIN — ONDANSETRON HYDROCHLORIDE 4 MG: 2 SOLUTION INTRAMUSCULAR; INTRAVENOUS at 11:56

## 2019-10-28 RX ADMIN — PROPOFOL 200 MG: 10 INJECTION, EMULSION INTRAVENOUS at 11:56

## 2019-10-28 RX ADMIN — EPHEDRINE SULFATE 10 MG: 50 INJECTION INTRAVENOUS at 12:10

## 2019-10-28 RX ADMIN — KETOROLAC TROMETHAMINE 30 MG: 30 INJECTION, SOLUTION INTRAMUSCULAR at 12:47

## 2019-10-28 RX ADMIN — SODIUM CHLORIDE, POTASSIUM CHLORIDE, SODIUM LACTATE AND CALCIUM CHLORIDE 1000 ML: 600; 310; 30; 20 INJECTION, SOLUTION INTRAVENOUS at 11:04

## 2019-10-28 RX ADMIN — LIDOCAINE HYDROCHLORIDE 100 MG: 20 INJECTION, SOLUTION INFILTRATION; PERINEURAL at 11:56

## 2019-10-28 RX ADMIN — DEXAMETHASONE SODIUM PHOSPHATE 4 MG: 4 INJECTION, SOLUTION INTRAMUSCULAR; INTRAVENOUS at 11:43

## 2019-10-28 NOTE — BRIEF OP NOTE
EXTRACORPOREAL SHOCKWAVE LITHOTRIPSY  Progress Note    Martin Durán  10/28/2019    Pre-op Diagnosis:   Calculus of kidney [N20.0]       Post-Op Diagnosis Codes:     * Calculus of kidney [N20.0]    Procedure/CPT® Codes:      Procedure(s):  LEFT EXTRACORPOREAL SHOCKWAVE LITHOTRIPSY    Surgeon(s):  Maninder Hadley MD    Anesthesia: General    Staff:   Circulator: Ceci Angelo RN  Scrub Person: Emanuel Hoyos  Vendor Representative: John Alvarez    Estimated Blood Loss: none    Urine Voided: * No values recorded between 10/28/2019 11:51 AM and 10/28/2019 12:53 PM *    Specimens:                None          Drains:  None    Findings: Good stone fragmentation    Complications: None      Maninder Hadley MD     Date: 10/28/2019  Time: 12:53 PM

## 2019-10-28 NOTE — ANESTHESIA POSTPROCEDURE EVALUATION
Patient: Martin Durán    Procedure Summary     Date:  10/28/19 Room / Location:   [facility] OR 09 /  [facility] OR    Anesthesia Start:  1153 Anesthesia Stop:  1257    Procedure:  LEFT EXTRACORPOREAL SHOCKWAVE LITHOTRIPSY (Left ) Diagnosis:       Calculus of kidney      (Calculus of kidney [N20.0])    Surgeon:  Maninder Hadley MD Provider:  David Benton CRNA    Anesthesia Type:  general ASA Status:  2          Anesthesia Type: general  Last vitals  BP   149/65 (10/28/19 1401)   Temp   97.7 °F (36.5 °C) (10/28/19 1258)   Pulse   67 (10/28/19 1401)   Resp   18 (10/28/19 1401)     SpO2   98 % (10/28/19 1401)     Post Anesthesia Care and Evaluation    Patient location during evaluation: PACU  Patient participation: complete - patient participated  Level of consciousness: awake and alert  Pain management: adequate  Airway patency: patent  Anesthetic complications: No anesthetic complications    Cardiovascular status: acceptable  Respiratory status: acceptable  Hydration status: acceptable    Comments: Blood pressure 149/65, pulse 67, temperature 97.7 °F (36.5 °C), temperature source Temporal, resp. rate 18, SpO2 98 %.    Pt discharged from PACU based on yumiko score >8

## 2019-10-28 NOTE — OP NOTE
OP NOTE  Martin Durán    Date of Procedure: 10/28/2019    Pre-op Diagnosis:   Calculus of kidney [N20.0]    Post-op Diagnosis:     Post-Op Diagnosis Codes:     * Calculus of kidney [N20.0]    Procedure/CPT® Codes:      Procedure(s):  LEFT EXTRACORPOREAL SHOCKWAVE LITHOTRIPSY    Surgeon(s):  Maninder Hadley MD    Anesthesia: General    Staff:   Circulator: Ceci Angelo RN  Scrub Person: Emanuel Hoyos  Vendor Representative: John Alvarez    Indications:   9 mm left lower pole kidney stone and other kidney stones.   After discussion of options, patient has given informed consent to undergo left ESWL.  All risks, benefits, and alternatives were discussed.    Operative Report: The patient is identified. The KUB is reviewed. After answering any questions, patient was brought to the operating room and placed on the patient table of the [facility].  General endotracheal anesthesia was administered.  Preoperative KUB was reviewed.   An [facility] c-arm fluoroscope was used to identify the stone.    The shock-head is brought into postion.  This stone is brought into the F2 plane for focus.  Treatment was initiated.  We gradually increased the energy level from 1 to 7.  This stone was treated at a rate of 60 for the first 500 shocks followed by 90 shocks for the rest of the case.  We paused for 2 minutes after 300 shocks to reduce risk of renal damage.  The stone was periodically checked to make sure that we were on it and getting good breakup.  We checked at 700, 1000, 1500, 2000, and 2500 shocks.  The stone did have good breakup.  I felt it was unnecessary to place a ureteral stent.  At the conclusion of 3000 shocks, the patient was awakened from anesthesia and transferred to the recovery room in satisfactory condition.      Estimated Blood Loss: <30 mL    Specimens:                None      Drains:  None     Complications: none  Plan: Repeat KUB few weeks. Home today if no evidence infection or significant  bleeding.     Maninder Hadley MD     Date: 10/28/2019  Time: 12:54 PM

## 2019-10-28 NOTE — ANESTHESIA PROCEDURE NOTES
Airway  Urgency: elective    Airway not difficult    General Information and Staff    Patient location during procedure: OR  CRNA: David Benton CRNA    Indications and Patient Condition  Indications for airway management: airway protection    Preoxygenated: yes  Mask difficulty assessment: 0 - not attempted    Final Airway Details  Final airway type: supraglottic airway      Successful airway: I-gel  Size 5    Number of attempts at approach: 1  Assessment: lips, teeth, and gum same as pre-op and atraumatic intubation

## 2019-10-28 NOTE — DISCHARGE INSTRUCTIONS

## 2019-10-28 NOTE — H&P
Mr. Durán is 69 y.o. male     Chief Complaint: BPH and Calculus of kidney     History of Present Illness   69-year-old male established patient previously followed by Dr. Franco here for multiple problems.  He has known enlarged prostate on tamsulosin.  He has kidney stones.  KUB today shows 1 cm left-sided stone and multiple other nonobstructing nephroliths.  He also complains of new problem of erectile dysfunction.  Quality painless.  Severity loss of function.  Timing constant.  Context never treated before.  PSA on 9/12/2019 was normal 0.64     I spent time today reviewing and summarizing old records last urology clinic visit with Dr. Franco 7/24/18.     I independently visualized and reviewed the patient's prior imaging studies today in clinic and discussed the imaging findings with the patient.           The following portions of the patient's history were reviewed and updated as appropriate: allergies, current medications, past family history, past medical history, past social history, past surgical history and problem list.     Review of Systems   Constitutional: Negative for chills and fever.   Gastrointestinal: Negative for abdominal pain, anal bleeding and blood in stool.   Genitourinary: Negative for dysuria, frequency, hematuria and urgency.   All other systems reviewed and are negative.           Current Outpatient Medications:   •  allopurinol (ZYLOPRIM) 300 MG tablet, Take 300 mg by mouth Daily., Disp: , Rfl: 3  •  amLODIPine (NORVASC) 5 MG tablet, Take 5 mg by mouth Daily., Disp: , Rfl: 5  •  atenolol (TENORMIN) 50 MG tablet, Take 50 mg by mouth Daily., Disp: , Rfl: 3  •  meloxicam (MOBIC) 7.5 MG tablet, Take 7.5 mg by mouth Daily. Stopped prior to surgery on 6/1/17, Disp: , Rfl:   •  Multiple Vitamins-Minerals (MULTIVITAMIN ADULT PO), Take 1 capsule by mouth Daily., Disp: , Rfl:   •  tamsulosin (FLOMAX) 0.4 MG capsule 24 hr capsule, TAKE ONE CAPSULE BY MOUTH EVERY DAY, Disp: 90 capsule, Rfl:  "5     Medical History        Past Medical History:   Diagnosis Date   • Arthritis       gout   • BPH with urinary obstruction     • Calculus of kidney     • Calculus of ureter     • Hearing loss     • Hypertension     • Microscopic hematuria     • Nocturia              Surgical History         Past Surgical History:   Procedure Laterality Date   • CARPAL TUNNEL RELEASE       • EXTRACORPOREAL SHOCK WAVE LITHOTRIPSY (ESWL) Left 3/1/2017     Procedure: LEFT EXTRACORPOREAL SHOCKWAVE LITHOTRIPSY;  Surgeon: Emanuel Franco MD;  Location: [facility] OR;  Service:    • EXTRACORPOREAL SHOCK WAVE LITHOTRIPSY (ESWL) Left 2017     Procedure: EXTRACORPOREAL SHOCKWAVE LITHOTRIPSY;  Surgeon: Emanuel Franco MD;  Location: [facility] OR;  Service:    • EXTRACORPOREAL SHOCKWAVE LITHOTRIPSY (ESWL), STENT INSERTION/REMOVAL       • HEMORRHOIDECTOMY       • KNEE SURGERY                Social History   Social History            Socioeconomic History   • Marital status:        Spouse name: Not on file   • Number of children: Not on file   • Years of education: Not on file   • Highest education level: Not on file   Tobacco Use   • Smoking status: Former Smoker       Types: Pipe       Last attempt to quit: 1973       Years since quittin.7   • Smokeless tobacco: Never Used   • Tobacco comment: only a pipe smoker and quit many years ago   Substance and Sexual Activity   • Alcohol use: No   • Drug use: No   • Sexual activity: Defer                  Family History   Problem Relation Age of Onset   • No Known Problems Father     • No Known Problems Mother           Objective     Temp 97 °F (36.1 °C)   Ht 188 cm (74\")   Wt 119 kg (263 lb 3.2 oz)   BMI 33.79 kg/m²      Physical Exam  Constitutional: Well nourished, Well developed; No apparent distress.  His vital signs are reviewed  Psychiatric: Appropriate affect; Alert and oriented  Eyes: Unremarkable  Musculoskeletal: Normal gait and station  GI: Abdomen is soft, " non-tender  Respiratory: No distress; Unlabored movement; No accessory musculature needed with symmetric movements  Skin: No pallor or diaphoresis  ; Penis and testicles are normal; Prostate 40 mL without nodule              Results for orders placed or performed in visit on 09/11/19   PSA Diagnostic   Result Value Ref Range     PSA 0.642 0.000 - 4.000 ng/mL      Patient's Body mass index is 33.79 kg/m². BMI is above normal parameters. Recommendations include: educational material.     International Prostate Symptom Score  The following is posted based on patient questionnaire answers:  0 - not at all                                         1-7 mild symptoms  1- Less than one time in five               8-19 moderate symptoms  2 -Less than half the time                   20-35 severe symptoms  3 - About half the time  4 - More than half the time  5 - Almost always      For following sections:  Incomplete Emptying: - How often have you had the sensation  of not emptying your bladder completely after you finished urinating?                     2  Frequency: -How often have you had to urinate again less than   two hours after you finished urinating?                                                                      3  Intermittency: -How often have you found you stopped and started again  Several times when you urinate?                                                                               2  Urgency: -How often do you find it difficult to postpone urination?                                                                                                                                      2  Weak stream: - How often have you had a weak urinary stream?                                                                                                                                      1  Straining: - How often have you had to push or strain to begin  Urination?                                                                                                                     0  Sleeping: -How many times did you most typically get up to urinate   From the time you went to bed at night until the time you got up in the                    1  Morning                                                                                                  Total    `                       11     Quality of Life  How would you feel if you had to live with your urinary condition the way                2  It is now, no better, no worse for the rest of your life?     Where: 0=delighted; 1= pleased, 2= mostly satisfied, 3= mixed, 4 = mostly                        Dissatisfied, 5= Unhappy, 6 = terrible     Assessment and Plan     Diagnoses and all orders for this visit:     BPH with obstruction/lower urinary tract symptoms  -     POC Urinalysis Dipstick, Multipro  -     PSA DIAGNOSTIC; Future     Calculus of kidney  -     Case Request; Standing  -     sodium chloride 0.9 % infusion  -     ceFAZolin (ANCEF) 2 g in sodium chloride 0.9 % 100 mL IVPB  -     Urinalysis without microscopic (no culture) - Urine, Clean Catch; Future  -     Urinalysis With Culture If Indicated -; Future  -     Case Request     Erectile disorder due to medical condition in male  -     sildenafil (VIAGRA) 100 MG tablet; 1/2 to 1 tab by mouth 1 hour prior to intercourse     Other orders  -     Follow Anesthesia Guidelines / Standing Orders; Future  -     Obtain Informed Consent  -     Provide NPO Instructions to Patient; Future  -     Follow Anesthesia Guidelines / Standing Orders; Standing  -     Verify NPO Status; Standing  -     SCD (Sequential Compression Device) - Place on Patient in Pre-Op; Standing  -     Verify/Perform Chlorhexidine Skin Prep; Standing  -     Verify / Perform Chlorhexidine Skin Prep if Indicated (If Not Already Completed); Standing  -     ECG 12 Lead; Standing  -     XR Abdomen KUB; Standing        1.  Enlarged prostate- continue tamsulosin.  2.   Nephrolithiasis, largest 1 cm on the left.  Patient desires to schedule left ESWL.  Discussed risks of procedure including infection, bleeding, need for additional procedures, inability to break up and/or pass any and/or all stone.  Complications of anesthesia.  He voiced understanding and provided informed consent to proceed next month with left ESWL.  3.  Erectile dysfunction.  He desires trial of PDE inhibitor therapy.  He understands no nitrates.                  Revision History

## 2019-10-28 NOTE — ANESTHESIA PREPROCEDURE EVALUATION
Anesthesia Evaluation     Patient summary reviewed   no history of anesthetic complications:  NPO Solid Status: > 8 hours  NPO Liquid Status: > 8 hours           Airway   Mallampati: II  TM distance: >3 FB  Neck ROM: full  Dental    (+) upper dentures and lower dentures    Pulmonary    (-) asthma, sleep apnea, not a smoker  Cardiovascular   Exercise tolerance: good (4-7 METS)    ECG reviewed  Patient on routine beta blocker and Beta blocker given within 24 hours of surgery    (+) hypertension,   (-) pacemaker, past MI, angina, cardiac stents      Neuro/Psych  (-) seizures, TIA, CVA  GI/Hepatic/Renal/Endo    (+) obesity,   renal disease stones,   (-) GERD, hepatitis, liver disease, diabetes    Musculoskeletal     Abdominal    Substance History      OB/GYN          Other   (+) arthritis                       Anesthesia Plan    ASA 2     general     intravenous induction   Anesthetic plan, all risks, benefits, and alternatives have been provided, discussed and informed consent has been obtained with: patient.

## 2019-12-13 LAB
ALBUMIN SERPL-MCNC: 4.3 G/DL (ref 3.5–5.2)
ALP BLD-CCNC: 111 U/L (ref 40–130)
ALT SERPL-CCNC: 26 U/L (ref 5–41)
ANION GAP SERPL CALCULATED.3IONS-SCNC: 15 MMOL/L (ref 7–19)
AST SERPL-CCNC: 28 U/L (ref 5–40)
BASOPHILS ABSOLUTE: 0 K/UL (ref 0–0.2)
BASOPHILS RELATIVE PERCENT: 0.9 % (ref 0–1)
BILIRUB SERPL-MCNC: 0.8 MG/DL (ref 0.2–1.2)
BUN BLDV-MCNC: 19 MG/DL (ref 8–23)
CALCIUM SERPL-MCNC: 9.7 MG/DL (ref 8.8–10.2)
CHLORIDE BLD-SCNC: 104 MMOL/L (ref 98–111)
CHOLESTEROL, TOTAL: 111 MG/DL (ref 160–199)
CO2: 25 MMOL/L (ref 22–29)
CREAT SERPL-MCNC: 0.9 MG/DL (ref 0.5–1.2)
EOSINOPHILS ABSOLUTE: 0.1 K/UL (ref 0–0.6)
EOSINOPHILS RELATIVE PERCENT: 2.2 % (ref 0–5)
GFR NON-AFRICAN AMERICAN: >60
GLUCOSE BLD-MCNC: 123 MG/DL (ref 74–109)
HCT VFR BLD CALC: 43.1 % (ref 42–52)
HDLC SERPL-MCNC: 36 MG/DL (ref 55–121)
HEMOGLOBIN: 14 G/DL (ref 14–18)
IMMATURE GRANULOCYTES #: 0 K/UL
LDL CHOLESTEROL CALCULATED: 63 MG/DL
LYMPHOCYTES ABSOLUTE: 1.2 K/UL (ref 1.1–4.5)
LYMPHOCYTES RELATIVE PERCENT: 36.6 % (ref 20–40)
MCH RBC QN AUTO: 31 PG (ref 27–31)
MCHC RBC AUTO-ENTMCNC: 32.5 G/DL (ref 33–37)
MCV RBC AUTO: 95.6 FL (ref 80–94)
MONOCYTES ABSOLUTE: 0.3 K/UL (ref 0–0.9)
MONOCYTES RELATIVE PERCENT: 9.6 % (ref 0–10)
NEUTROPHILS ABSOLUTE: 1.6 K/UL (ref 1.5–7.5)
NEUTROPHILS RELATIVE PERCENT: 50.4 % (ref 50–65)
PDW BLD-RTO: 13.5 % (ref 11.5–14.5)
PLATELET # BLD: 146 K/UL (ref 130–400)
PMV BLD AUTO: 10.7 FL (ref 9.4–12.4)
POTASSIUM SERPL-SCNC: 4.3 MMOL/L (ref 3.5–5)
PROSTATE SPECIFIC ANTIGEN: 0.68 NG/ML (ref 0–4)
RBC # BLD: 4.51 M/UL (ref 4.7–6.1)
SODIUM BLD-SCNC: 144 MMOL/L (ref 136–145)
TOTAL PROTEIN: 7.8 G/DL (ref 6.6–8.7)
TRIGL SERPL-MCNC: 60 MG/DL (ref 0–149)
TSH SERPL DL<=0.05 MIU/L-ACNC: 0.87 UIU/ML (ref 0.27–4.2)
WBC # BLD: 3.2 K/UL (ref 4.8–10.8)

## 2020-01-21 NOTE — PROGRESS NOTES
Subjective    Mr. Durán is 69 y.o. male    Chief Complaint: Calculus of kidney    History of Present Illness    69-year-old male established patient follow-up status post left ESWL 10/28/2019 for 9 mm left kidney stone.  Renal ultrasound reviewed with the patient today shows no hydronephrosis and no kidney stone visible on the left.  He does have a remaining 9 mm right-sided kidney stone, currently asymptomatic.  Quality painless.  Location right kidney.  Severity 9 mm.  Context LUTS are currently controlled on tamsulosin for his enlarged prostate.   Associated symptoms erectile dysfunction for which he was previously given sildenafil.   PSA on 9/12/2019 was normal 0.64       I independently visualized and reviewed the patient's prior imaging studies today in clinic and discussed the imaging findings with the patient.     The following portions of the patient's history were reviewed and updated as appropriate: allergies, current medications, past family history, past medical history, past social history, past surgical history and problem list.    Review of Systems   Constitutional: Negative for chills and fever.   Gastrointestinal: Negative for abdominal pain, anal bleeding and blood in stool.   Genitourinary: Negative for dysuria, frequency, hematuria and urgency.   All other systems reviewed and are negative.        Current Outpatient Medications:   •  allopurinol (ZYLOPRIM) 300 MG tablet, Take 300 mg by mouth Daily., Disp: , Rfl: 3  •  amLODIPine (NORVASC) 5 MG tablet, Take 5 mg by mouth Daily., Disp: , Rfl: 5  •  atenolol (TENORMIN) 50 MG tablet, Take 50 mg by mouth Daily., Disp: , Rfl: 3  •  meloxicam (MOBIC) 7.5 MG tablet, Take 7.5 mg by mouth Daily. Stopped prior to surgery on 6/1/17, Disp: , Rfl:   •  Multiple Vitamins-Minerals (MULTIVITAMIN ADULT PO), Take 1 capsule by mouth Daily., Disp: , Rfl:   •  ondansetron (ZOFRAN) 4 MG tablet, Take 1 tablet by mouth Every 6 (Six) Hours As Needed for Nausea or  Vomiting., Disp: 10 tablet, Rfl: 0  •  sildenafil (VIAGRA) 100 MG tablet, 1/2 to 1 tab by mouth 1 hour prior to intercourse, Disp: 10 tablet, Rfl: 5  •  tamsulosin (FLOMAX) 0.4 MG capsule 24 hr capsule, TAKE ONE CAPSULE BY MOUTH EVERY DAY, Disp: 90 capsule, Rfl: 5  •  traMADol (ULTRAM) 50 MG tablet, Take 1 tablet by mouth Every 6 (Six) Hours As Needed (prn postop pain)., Disp: 15 tablet, Rfl: 0    Past Medical History:   Diagnosis Date   • Arthritis     gout   • BPH with urinary obstruction    • Calculus of kidney    • Calculus of ureter    • Hearing loss    • Hypertension    • Microscopic hematuria    • Nocturia        Past Surgical History:   Procedure Laterality Date   • CARPAL TUNNEL RELEASE     • EXTRACORPOREAL SHOCK WAVE LITHOTRIPSY (ESWL) Left 3/1/2017    Procedure: LEFT EXTRACORPOREAL SHOCKWAVE LITHOTRIPSY;  Surgeon: Emanuel Franco MD;  Location: [facility] OR;  Service:    • EXTRACORPOREAL SHOCK WAVE LITHOTRIPSY (ESWL) Left 2017    Procedure: EXTRACORPOREAL SHOCKWAVE LITHOTRIPSY;  Surgeon: Emanuel Franco MD;  Location: [facility] OR;  Service:    • EXTRACORPOREAL SHOCK WAVE LITHOTRIPSY (ESWL) Left 10/28/2019    Procedure: LEFT EXTRACORPOREAL SHOCKWAVE LITHOTRIPSY;  Surgeon: Maninder Hadley MD;  Location: [facility] OR;  Service: Urology   • EXTRACORPOREAL SHOCKWAVE LITHOTRIPSY (ESWL), STENT INSERTION/REMOVAL     • HEMORRHOIDECTOMY     • KNEE SURGERY Left     arthroscopy       Social History     Socioeconomic History   • Marital status:      Spouse name: Not on file   • Number of children: Not on file   • Years of education: Not on file   • Highest education level: Not on file   Tobacco Use   • Smoking status: Former Smoker     Types: Pipe     Last attempt to quit: 1973     Years since quittin.1   • Smokeless tobacco: Never Used   • Tobacco comment: only a pipe smoker and quit many years ago   Substance and Sexual Activity   • Alcohol use: No   • Drug use: No   • Sexual activity:  "Defer       Family History   Problem Relation Age of Onset   • No Known Problems Father    • No Known Problems Mother        Objective    Temp 98.2 °F (36.8 °C)   Ht 188 cm (74\")   Wt 119 kg (262 lb)   BMI 33.64 kg/m²     Physical Exam  Constitutional: Well nourished, Well developed; No apparent distress; Vital reviewed as above  Psychiatric: Appropriate affect; Alert and oriented  Eyes: Unremarkable  Musculoskeletal: Normal gait and station  GI: Abdomen is soft, non-tender  Respiratory: No distress; Unlabored movement; No accessory musculature needed with symmetric movements  Skin: No pallor or diaphoresis  Lymphatic: No adenopathy neck or groin      Results for orders placed or performed in visit on 01/28/20   POC Urinalysis Dipstick, Multipro   Result Value Ref Range    Color Yellow Yellow, Straw, Dark Yellow, Adrianne    Clarity, UA Clear Clear    Glucose, UA Negative Negative, 1000 mg/dL (3+) mg/dL    Bilirubin Negative Negative    Ketones, UA Negative Negative    Specific Gravity  1.025 1.005 - 1.030    Blood, UA Small (A) Negative    pH, Urine 6.5 5.0 - 8.0    Protein, POC Negative Negative mg/dL    Urobilinogen, UA Normal Normal    Nitrite, UA Negative Negative    Leukocytes Negative Negative     Patient's Body mass index is 33.64 kg/m². BMI is above normal parameters. Recommendations include: educational material.  Assessment and Plan    Diagnoses and all orders for this visit:    Calculus of kidney  -     POC Urinalysis Dipstick, Multipro  -     US renal bilateral; Future  -     XR abdomen kub; Future    Renal stone    Erectile disorder due to medical condition in male    Essential hypertension      Doing well status post left ESWL no remaining stone visible on left.  He has remaining 9 mm right-sided kidney stone, currently asymptomatic.  We discussed possible right ESWL but he would like to wait to schedule for now.  Follow-up will be in 6 months with pre-clinic KUB or sooner as needed.        "

## 2020-01-24 ENCOUNTER — HOSPITAL ENCOUNTER (OUTPATIENT)
Dept: ULTRASOUND IMAGING | Facility: HOSPITAL | Age: 70
Discharge: HOME | End: 2020-01-24
Admitting: UROLOGY

## 2020-01-24 DIAGNOSIS — N20.0 CALCULUS OF KIDNEY: ICD-10-CM

## 2020-01-24 PROCEDURE — 76775 US EXAM ABDO BACK WALL LIM: CPT

## 2020-01-28 ENCOUNTER — OFFICE VISIT (OUTPATIENT)
Dept: UROLOGY | Facility: CLINIC | Age: 70
End: 2020-01-28

## 2020-01-28 VITALS — WEIGHT: 262 LBS | HEIGHT: 74 IN | BODY MASS INDEX: 33.62 KG/M2 | TEMPERATURE: 98.2 F

## 2020-01-28 DIAGNOSIS — N52.1 ERECTILE DISORDER DUE TO MEDICAL CONDITION IN MALE: ICD-10-CM

## 2020-01-28 DIAGNOSIS — N20.0 RENAL STONE: ICD-10-CM

## 2020-01-28 DIAGNOSIS — I10 ESSENTIAL HYPERTENSION: ICD-10-CM

## 2020-01-28 DIAGNOSIS — N20.0 CALCULUS OF KIDNEY: Primary | ICD-10-CM

## 2020-01-28 LAB
BILIRUB BLD-MCNC: NEGATIVE MG/DL
CLARITY, POC: CLEAR
COLOR UR: YELLOW
GLUCOSE UR STRIP-MCNC: NEGATIVE MG/DL
KETONES UR QL: NEGATIVE
LEUKOCYTE EST, POC: NEGATIVE
NITRITE UR-MCNC: NEGATIVE MG/ML
PH UR: 6.5 [PH] (ref 5–8)
PROT UR STRIP-MCNC: NEGATIVE MG/DL
RBC # UR STRIP: ABNORMAL /UL
SP GR UR: 1.02 (ref 1–1.03)
UROBILINOGEN UR QL: NORMAL

## 2020-01-28 PROCEDURE — 81001 URINALYSIS AUTO W/SCOPE: CPT | Performed by: UROLOGY

## 2020-01-28 PROCEDURE — 99214 OFFICE O/P EST MOD 30 MIN: CPT | Performed by: UROLOGY

## 2020-01-28 NOTE — PATIENT INSTRUCTIONS

## 2020-06-12 ENCOUNTER — TELEPHONE (OUTPATIENT)
Dept: UROLOGY | Facility: CLINIC | Age: 70
End: 2020-06-12

## 2020-06-12 DIAGNOSIS — N13.8 BPH WITH OBSTRUCTION/LOWER URINARY TRACT SYMPTOMS: ICD-10-CM

## 2020-06-12 DIAGNOSIS — N40.1 BPH WITH OBSTRUCTION/LOWER URINARY TRACT SYMPTOMS: ICD-10-CM

## 2020-06-12 RX ORDER — TAMSULOSIN HYDROCHLORIDE 0.4 MG/1
1 CAPSULE ORAL DAILY
Qty: 90 CAPSULE | Refills: 5 | Status: SHIPPED | OUTPATIENT
Start: 2020-06-12 | End: 2021-05-25 | Stop reason: SDUPTHER

## 2020-06-12 NOTE — TELEPHONE ENCOUNTER
Dr. Hadley's patient    Patient is wanting a refill on Tamsulosin. [facility] here in [place].

## 2020-07-21 NOTE — PROGRESS NOTES
Subjective    Mr. Durán is 70 y.o. male    Chief Complaint: Calculus of kidney    History of Present Illness    70-year-old male established patient follow-up for kidney stones.  Location bilateral kidneys.  Severity largest right side 9 mm.  Quality currently painless.  Context he had left ESWL 10/28/2019 for 9 mm left kidney stone.  KUB today shows 9 mm right kidney stone along with a couple of smaller left-sided stones. Context LUTS are currently controlled on tamsulosin for his enlarged prostate.   Associated symptoms erectile dysfunction for which he was previously given sildenafil.   PSA on 9/12/2019 was normal 0.64     I independently visualized and reviewed the patient's prior imaging studies today in clinic and discussed the imaging findings with the patient.    The following portions of the patient's history were reviewed and updated as appropriate: allergies, current medications, past family history, past medical history, past social history, past surgical history and problem list.    Review of Systems   Constitutional: Negative for chills and fever.   Gastrointestinal: Negative for abdominal pain, anal bleeding and blood in stool.   Genitourinary: Negative for dysuria, flank pain, frequency, hematuria and urgency.   All other systems reviewed and are negative.        Current Outpatient Medications:   •  allopurinol (ZYLOPRIM) 300 MG tablet, Take 300 mg by mouth Daily., Disp: , Rfl: 3  •  amLODIPine (NORVASC) 5 MG tablet, Take 5 mg by mouth Daily., Disp: , Rfl: 5  •  atenolol (TENORMIN) 50 MG tablet, Take 50 mg by mouth Daily., Disp: , Rfl: 3  •  meloxicam (MOBIC) 7.5 MG tablet, Take 7.5 mg by mouth Daily. Stopped prior to surgery on 6/1/17, Disp: , Rfl:   •  Multiple Vitamins-Minerals (MULTIVITAMIN ADULT PO), Take 1 capsule by mouth Daily., Disp: , Rfl:   •  ondansetron (ZOFRAN) 4 MG tablet, Take 1 tablet by mouth Every 6 (Six) Hours As Needed for Nausea or Vomiting., Disp: 10 tablet, Rfl: 0  •   sildenafil (VIAGRA) 100 MG tablet, 1/2 to 1 tab by mouth 1 hour prior to intercourse, Disp: 10 tablet, Rfl: 5  •  tamsulosin (FLOMAX) 0.4 MG capsule 24 hr capsule, Take 1 capsule by mouth Daily., Disp: 90 capsule, Rfl: 5  •  traMADol (ULTRAM) 50 MG tablet, Take 1 tablet by mouth Every 6 (Six) Hours As Needed (prn postop pain)., Disp: 15 tablet, Rfl: 0    Past Medical History:   Diagnosis Date   • Arthritis     gout   • BPH with urinary obstruction    • Calculus of kidney    • Calculus of ureter    • Hearing loss    • Hypertension    • Microscopic hematuria    • Nocturia        Past Surgical History:   Procedure Laterality Date   • CARPAL TUNNEL RELEASE     • EXTRACORPOREAL SHOCK WAVE LITHOTRIPSY (ESWL) Left 3/1/2017    Procedure: LEFT EXTRACORPOREAL SHOCKWAVE LITHOTRIPSY;  Surgeon: Emanuel Franco MD;  Location: [facility] OR;  Service:    • EXTRACORPOREAL SHOCK WAVE LITHOTRIPSY (ESWL) Left 2017    Procedure: EXTRACORPOREAL SHOCKWAVE LITHOTRIPSY;  Surgeon: Emanuel Franco MD;  Location: [facility] OR;  Service:    • EXTRACORPOREAL SHOCK WAVE LITHOTRIPSY (ESWL) Left 10/28/2019    Procedure: LEFT EXTRACORPOREAL SHOCKWAVE LITHOTRIPSY;  Surgeon: Maninder Hadley MD;  Location: [facility] OR;  Service: Urology   • EXTRACORPOREAL SHOCKWAVE LITHOTRIPSY (ESWL), STENT INSERTION/REMOVAL     • HEMORRHOIDECTOMY     • KNEE SURGERY Left     arthroscopy       Social History     Socioeconomic History   • Marital status:      Spouse name: Not on file   • Number of children: Not on file   • Years of education: Not on file   • Highest education level: Not on file   Tobacco Use   • Smoking status: Former Smoker     Types: Pipe     Last attempt to quit: 1973     Years since quittin.5   • Smokeless tobacco: Never Used   • Tobacco comment: only a pipe smoker and quit many years ago   Substance and Sexual Activity   • Alcohol use: No   • Drug use: No   • Sexual activity: Defer       Family History   Problem Relation  Age of Onset   • No Known Problems Father    • No Known Problems Mother        Objective    There were no vitals taken for this visit.    Physical Exam  Constitutional: Well nourished, Well developed; No apparent distress; Vital reviewed as above  Psychiatric: Appropriate affect; Alert and oriented  Eyes: Unremarkable  Musculoskeletal: Normal gait and station  GI: Abdomen is soft, non-tender  Respiratory: No distress; Unlabored movement; No accessory musculature needed with symmetric movements  Skin: No pallor or diaphoresis  Lymphatic: No adenopathy neck or groin      Results for orders placed or performed in visit on 01/28/20   POC Urinalysis Dipstick, Multipro   Result Value Ref Range    Color Yellow Yellow, Straw, Dark Yellow, Adrianne    Clarity, UA Clear Clear    Glucose, UA Negative Negative, 1000 mg/dL (3+) mg/dL    Bilirubin Negative Negative    Ketones, UA Negative Negative    Specific Gravity  1.025 1.005 - 1.030    Blood, UA Small (A) Negative    pH, Urine 6.5 5.0 - 8.0    Protein, POC Negative Negative mg/dL    Urobilinogen, UA Normal Normal    Nitrite, UA Negative Negative    Leukocytes Negative Negative     Assessment and Plan    Diagnoses and all orders for this visit:    Calculus of kidney  -     Cancel: POC Urinalysis Dipstick, Multipro    BPH (benign prostatic hypertrophy) with urinary obstruction    Erectile disorder due to medical condition in male    9 mm right kidney stone and couple smaller left-sided stones, currently asymptomatic.  He would like to think about right ESWL in October and will call back to schedule.  Continue tamsulosin.      This document has been signed by HORACE Hadley MD on July 28, 2020 17:09

## 2020-07-28 ENCOUNTER — OFFICE VISIT (OUTPATIENT)
Dept: UROLOGY | Facility: CLINIC | Age: 70
End: 2020-07-28

## 2020-07-28 ENCOUNTER — HOSPITAL ENCOUNTER (OUTPATIENT)
Dept: GENERAL RADIOLOGY | Facility: HOSPITAL | Age: 70
Discharge: HOME | End: 2020-07-28
Admitting: UROLOGY

## 2020-07-28 VITALS — TEMPERATURE: 97.2 F | BODY MASS INDEX: 33.86 KG/M2 | WEIGHT: 263.8 LBS | HEIGHT: 74 IN

## 2020-07-28 DIAGNOSIS — N20.0 CALCULUS OF KIDNEY: ICD-10-CM

## 2020-07-28 DIAGNOSIS — N52.1 ERECTILE DISORDER DUE TO MEDICAL CONDITION IN MALE: ICD-10-CM

## 2020-07-28 DIAGNOSIS — N20.0 CALCULUS OF KIDNEY: Primary | ICD-10-CM

## 2020-07-28 DIAGNOSIS — N40.1 BENIGN PROSTATIC HYPERPLASIA WITH URINARY OBSTRUCTION: ICD-10-CM

## 2020-07-28 DIAGNOSIS — N13.8 BENIGN PROSTATIC HYPERPLASIA WITH URINARY OBSTRUCTION: ICD-10-CM

## 2020-07-28 PROCEDURE — 99213 OFFICE O/P EST LOW 20 MIN: CPT | Performed by: UROLOGY

## 2020-07-28 PROCEDURE — 74018 RADEX ABDOMEN 1 VIEW: CPT

## 2020-07-28 NOTE — PATIENT INSTRUCTIONS

## 2020-08-20 ENCOUNTER — RESULTS ENCOUNTER (OUTPATIENT)
Dept: UROLOGY | Facility: CLINIC | Age: 70
End: 2020-08-20

## 2020-08-20 DIAGNOSIS — N40.1 BPH WITH OBSTRUCTION/LOWER URINARY TRACT SYMPTOMS: ICD-10-CM

## 2020-08-20 DIAGNOSIS — N13.8 BPH WITH OBSTRUCTION/LOWER URINARY TRACT SYMPTOMS: ICD-10-CM

## 2020-10-21 ENCOUNTER — TRANSCRIBE ORDERS (OUTPATIENT)
Dept: ADMINISTRATIVE | Facility: HOSPITAL | Age: 70
End: 2020-10-21

## 2020-10-21 DIAGNOSIS — R07.9 CHEST PAIN, UNSPECIFIED TYPE: Primary | ICD-10-CM

## 2020-10-27 ENCOUNTER — HOSPITAL ENCOUNTER (OUTPATIENT)
Dept: CARDIOLOGY | Facility: HOSPITAL | Age: 70
Discharge: HOME | End: 2020-10-27

## 2020-10-27 ENCOUNTER — HOSPITAL ENCOUNTER (OUTPATIENT)
Dept: ULTRASOUND IMAGING | Facility: HOSPITAL | Age: 70
Discharge: HOME | End: 2020-10-27

## 2020-10-27 ENCOUNTER — TRANSCRIBE ORDERS (OUTPATIENT)
Dept: ADMINISTRATIVE | Facility: HOSPITAL | Age: 70
End: 2020-10-27

## 2020-10-27 VITALS — HEIGHT: 74 IN | BODY MASS INDEX: 33.24 KG/M2 | WEIGHT: 259 LBS

## 2020-10-27 DIAGNOSIS — R07.9 CHEST PAIN, UNSPECIFIED TYPE: ICD-10-CM

## 2020-10-27 LAB
BH CV STRESS BP STAGE 1: NORMAL
BH CV STRESS DURATION MIN STAGE 1: 6
BH CV STRESS DURATION SEC STAGE 1: 22
BH CV STRESS GRADE STAGE 1: 10
BH CV STRESS HR STAGE 1: 121
BH CV STRESS METS STAGE 1: 5
BH CV STRESS PROTOCOL 1: NORMAL
BH CV STRESS RECOVERY BP: NORMAL MMHG
BH CV STRESS RECOVERY HR: 94 BPM
BH CV STRESS SPEED STAGE 1: 1.7
BH CV STRESS STAGE 1: 1
MAXIMAL PREDICTED HEART RATE: 151 BPM
PERCENT MAX PREDICTED HR: 80.13 %
STRESS BASELINE BP: NORMAL MMHG
STRESS BASELINE HR: 76 BPM
STRESS PERCENT HR: 94 %
STRESS POST ESTIMATED WORKLOAD: 5 METS
STRESS POST EXERCISE DUR MIN: 6 MIN
STRESS POST EXERCISE DUR SEC: 22 SEC
STRESS POST PEAK BP: NORMAL MMHG
STRESS POST PEAK HR: 121 BPM
STRESS TARGET HR: 128 BPM

## 2020-10-27 PROCEDURE — 93018 CV STRESS TEST I&R ONLY: CPT | Performed by: INTERNAL MEDICINE

## 2020-10-27 PROCEDURE — 76705 ECHO EXAM OF ABDOMEN: CPT

## 2020-10-27 PROCEDURE — 93017 CV STRESS TEST TRACING ONLY: CPT

## 2020-11-30 LAB
ALBUMIN SERPL-MCNC: 4.1 G/DL (ref 3.5–5.2)
ALP BLD-CCNC: 124 U/L (ref 40–130)
ALT SERPL-CCNC: 31 U/L (ref 5–41)
ANION GAP SERPL CALCULATED.3IONS-SCNC: 10 MMOL/L (ref 7–19)
AST SERPL-CCNC: 30 U/L (ref 5–40)
BASOPHILS ABSOLUTE: 0 K/UL (ref 0–0.2)
BASOPHILS RELATIVE PERCENT: 0.6 % (ref 0–1)
BILIRUB SERPL-MCNC: 0.6 MG/DL (ref 0.2–1.2)
BUN BLDV-MCNC: 17 MG/DL (ref 8–23)
CALCIUM SERPL-MCNC: 9.4 MG/DL (ref 8.8–10.2)
CHLORIDE BLD-SCNC: 105 MMOL/L (ref 98–111)
CHOLESTEROL, TOTAL: 115 MG/DL (ref 160–199)
CO2: 27 MMOL/L (ref 22–29)
CREAT SERPL-MCNC: 0.9 MG/DL (ref 0.5–1.2)
EOSINOPHILS ABSOLUTE: 0.1 K/UL (ref 0–0.6)
EOSINOPHILS RELATIVE PERCENT: 3.2 % (ref 0–5)
GFR AFRICAN AMERICAN: >59
GFR NON-AFRICAN AMERICAN: >60
GLUCOSE BLD-MCNC: 130 MG/DL (ref 74–109)
HBA1C MFR BLD: 5.9 % (ref 4–6)
HCT VFR BLD CALC: 41.6 % (ref 42–52)
HDLC SERPL-MCNC: 34 MG/DL (ref 55–121)
HEMOGLOBIN: 13.7 G/DL (ref 14–18)
IMMATURE GRANULOCYTES #: 0 K/UL
LDL CHOLESTEROL CALCULATED: 71 MG/DL
LYMPHOCYTES ABSOLUTE: 1.2 K/UL (ref 1.1–4.5)
LYMPHOCYTES RELATIVE PERCENT: 37.7 % (ref 20–40)
MCH RBC QN AUTO: 30.4 PG (ref 27–31)
MCHC RBC AUTO-ENTMCNC: 32.9 G/DL (ref 33–37)
MCV RBC AUTO: 92.4 FL (ref 80–94)
MONOCYTES ABSOLUTE: 0.3 K/UL (ref 0–0.9)
MONOCYTES RELATIVE PERCENT: 8.5 % (ref 0–10)
NEUTROPHILS ABSOLUTE: 1.6 K/UL (ref 1.5–7.5)
NEUTROPHILS RELATIVE PERCENT: 49.7 % (ref 50–65)
PDW BLD-RTO: 13.5 % (ref 11.5–14.5)
PLATELET # BLD: 138 K/UL (ref 130–400)
PMV BLD AUTO: 10.6 FL (ref 9.4–12.4)
POTASSIUM SERPL-SCNC: 3.9 MMOL/L (ref 3.5–5)
PROSTATE SPECIFIC ANTIGEN: 0.72 NG/ML (ref 0–4)
RBC # BLD: 4.5 M/UL (ref 4.7–6.1)
SODIUM BLD-SCNC: 142 MMOL/L (ref 136–145)
TOTAL PROTEIN: 7.6 G/DL (ref 6.6–8.7)
TRIGL SERPL-MCNC: 49 MG/DL (ref 0–149)
TSH SERPL DL<=0.05 MIU/L-ACNC: 1.1 UIU/ML (ref 0.27–4.2)
WBC # BLD: 3.2 K/UL (ref 4.8–10.8)

## 2021-03-01 ENCOUNTER — IMMUNIZATION (OUTPATIENT)
Dept: VACCINE CLINIC | Facility: HOSPITAL | Age: 71
End: 2021-03-01

## 2021-03-01 PROCEDURE — 91301 HC SARSCO02 VAC 100MCG/0.5ML IM: CPT | Performed by: OBSTETRICS & GYNECOLOGY

## 2021-03-01 PROCEDURE — 0011A: CPT | Performed by: OBSTETRICS & GYNECOLOGY

## 2021-03-29 ENCOUNTER — IMMUNIZATION (OUTPATIENT)
Dept: VACCINE CLINIC | Facility: HOSPITAL | Age: 71
End: 2021-03-29

## 2021-03-29 PROCEDURE — 0012A: CPT | Performed by: OBSTETRICS & GYNECOLOGY

## 2021-03-29 PROCEDURE — 91301 HC SARSCO02 VAC 100MCG/0.5ML IM: CPT | Performed by: OBSTETRICS & GYNECOLOGY

## 2021-05-17 DIAGNOSIS — Z87.442 HISTORY OF KIDNEY STONES: Primary | ICD-10-CM

## 2021-05-25 ENCOUNTER — HOSPITAL ENCOUNTER (OUTPATIENT)
Dept: GENERAL RADIOLOGY | Facility: HOSPITAL | Age: 71
Discharge: HOME | End: 2021-05-25
Admitting: UROLOGY

## 2021-05-25 ENCOUNTER — OFFICE VISIT (OUTPATIENT)
Dept: UROLOGY | Facility: CLINIC | Age: 71
End: 2021-05-25

## 2021-05-25 VITALS — TEMPERATURE: 97.6 F | WEIGHT: 259 LBS | HEIGHT: 74 IN | BODY MASS INDEX: 33.24 KG/M2

## 2021-05-25 DIAGNOSIS — N13.8 BENIGN PROSTATIC HYPERPLASIA WITH URINARY OBSTRUCTION: ICD-10-CM

## 2021-05-25 DIAGNOSIS — Z87.442 HISTORY OF KIDNEY STONES: Primary | ICD-10-CM

## 2021-05-25 DIAGNOSIS — N13.8 BPH WITH OBSTRUCTION/LOWER URINARY TRACT SYMPTOMS: ICD-10-CM

## 2021-05-25 DIAGNOSIS — N40.1 BPH WITH OBSTRUCTION/LOWER URINARY TRACT SYMPTOMS: ICD-10-CM

## 2021-05-25 DIAGNOSIS — N52.1 ERECTILE DISORDER DUE TO MEDICAL CONDITION IN MALE: ICD-10-CM

## 2021-05-25 DIAGNOSIS — N20.0 CALCULUS OF KIDNEY: ICD-10-CM

## 2021-05-25 DIAGNOSIS — N40.1 BENIGN PROSTATIC HYPERPLASIA WITH URINARY OBSTRUCTION: ICD-10-CM

## 2021-05-25 PROCEDURE — 81003 URINALYSIS AUTO W/O SCOPE: CPT | Performed by: UROLOGY

## 2021-05-25 PROCEDURE — 99214 OFFICE O/P EST MOD 30 MIN: CPT | Performed by: UROLOGY

## 2021-05-25 PROCEDURE — 74018 RADEX ABDOMEN 1 VIEW: CPT

## 2021-05-25 RX ORDER — TAMSULOSIN HYDROCHLORIDE 0.4 MG/1
1 CAPSULE ORAL DAILY
Qty: 90 CAPSULE | Refills: 5 | Status: SHIPPED | OUTPATIENT
Start: 2021-05-25 | End: 2022-01-10 | Stop reason: SDUPTHER

## 2021-05-25 RX ORDER — SODIUM CHLORIDE 9 MG/ML
100 INJECTION, SOLUTION INTRAVENOUS CONTINUOUS
Status: CANCELLED | OUTPATIENT
Start: 2021-05-25

## 2021-05-25 NOTE — PATIENT INSTRUCTIONS
"BMI for Adults  What is BMI?  Body mass index (BMI) is a number that is calculated from a person's weight and height. BMI can help estimate how much of a person's weight is composed of fat. BMI does not measure body fat directly. Rather, it is an alternative to procedures that directly measure body fat, which can be difficult and expensive.  BMI can help identify people who may be at higher risk for certain medical problems.  What are BMI measurements used for?  BMI is used as a screening tool to identify possible weight problems. It helps determine whether a person is obese, overweight, a healthy weight, or underweight.  BMI is useful for:  · Identifying a weight problem that may be related to a medical condition or may increase the risk for medical problems.  · Promoting changes, such as changes in diet and exercise, to help reach a healthy weight. BMI screening can be repeated to see if these changes are working.  How is BMI calculated?  BMI involves measuring your weight in relation to your height. Both height and weight are measured, and the BMI is calculated from those numbers. This can be done either in English ([place] or metric measurements. Note that charts and online BMI calculators are available to help you find your BMI quickly and easily without having to do these calculations yourself.  To calculate your BMI in English ([place] measurements:    1. Measure your weight in pounds (lb).  2. Multiply the number of pounds by 703.  ? For example, for a person who weighs 180 lb, multiply that number by 703, which equals 126,540.  3. Measure your height in inches. Then multiply that number by itself to get a measurement called \"inches squared.\"  ? For example, for a person who is 70 inches tall, the \"inches squared\" measurement is 70 inches x 70 inches, which equals 4,900 inches squared.  4. Divide the total from step 2 (number of lb x 703) by the total from step 3 (inches squared): 126,540 ÷ 4,900 = 25.8. This is " "your BMI.  To calculate your BMI in metric measurements:  1. Measure your weight in kilograms (kg).  2. Measure your height in meters (m). Then multiply that number by itself to get a measurement called \"meters squared.\"  ? For example, for a person who is 1.75 m tall, the \"meters squared\" measurement is 1.75 m x 1.75 m, which is equal to 3.1 meters squared.  3. Divide the number of kilograms (your weight) by the meters squared number. In this example: 70 ÷ 3.1 = 22.6. This is your BMI.  What do the results mean?  BMI charts are used to identify whether you are underweight, normal weight, overweight, or obese. The following guidelines will be used:  · Underweight: BMI less than 18.5.  · Normal weight: BMI between 18.5 and 24.9.  · Overweight: BMI between 25 and 29.9.  · Obese: BMI of 30 or above.  Keep these notes in mind:  · Weight includes both fat and muscle, so someone with a muscular build, such as an athlete, may have a BMI that is higher than 24.9. In cases like these, BMI is not an accurate measure of body fat.  · To determine if excess body fat is the cause of a BMI of 25 or higher, further assessments may need to be done by a health care provider.  · BMI is usually interpreted in the same way for men and women.  Where to find more information  For more information about BMI, including tools to quickly calculate your BMI, go to these websites:  · Centers for Disease Control and Prevention: www.cdc.gov  · American Heart Association: www.heart.org  · National Heart, Lung, and Blood [place]: www.nhlbi.nih.gov  Summary  · Body mass index (BMI) is a number that is calculated from a person's weight and height.  · BMI may help estimate how much of a person's weight is composed of fat. BMI can help identify those who may be at higher risk for certain medical problems.  · BMI can be measured using English measurements or metric measurements.  · BMI charts are used to identify whether you are underweight, normal " weight, overweight, or obese.  This information is not intended to replace advice given to you by your health care provider. Make sure you discuss any questions you have with your health care provider.  Document Revised: 09/09/2020 Document Reviewed: 07/17/2020  Elsevier Patient Education © 2021 [employer].

## 2021-05-27 ENCOUNTER — TRANSCRIBE ORDERS (OUTPATIENT)
Dept: ADMINISTRATIVE | Facility: HOSPITAL | Age: 71
End: 2021-05-27

## 2021-05-27 DIAGNOSIS — Z11.59 SCREENING FOR VIRAL DISEASE: Primary | ICD-10-CM

## 2021-06-02 ENCOUNTER — LAB (OUTPATIENT)
Dept: LAB | Facility: HOSPITAL | Age: 71
End: 2021-06-02

## 2021-06-02 ENCOUNTER — PRE-ADMISSION TESTING (OUTPATIENT)
Dept: PREADMISSION TESTING | Facility: HOSPITAL | Age: 71
End: 2021-06-02

## 2021-06-02 VITALS
HEIGHT: 72 IN | HEART RATE: 59 BPM | WEIGHT: 260.14 LBS | DIASTOLIC BLOOD PRESSURE: 70 MMHG | BODY MASS INDEX: 35.24 KG/M2 | OXYGEN SATURATION: 100 % | RESPIRATION RATE: 20 BRPM | SYSTOLIC BLOOD PRESSURE: 165 MMHG

## 2021-06-02 DIAGNOSIS — N20.0 CALCULUS OF KIDNEY: ICD-10-CM

## 2021-06-02 LAB — SARS-COV-2 ORF1AB RESP QL NAA+PROBE: NOT DETECTED

## 2021-06-02 PROCEDURE — 81001 URINALYSIS AUTO W/SCOPE: CPT | Performed by: UROLOGY

## 2021-06-02 PROCEDURE — 80048 BASIC METABOLIC PNL TOTAL CA: CPT | Performed by: UROLOGY

## 2021-06-02 PROCEDURE — 93010 ELECTROCARDIOGRAM REPORT: CPT | Performed by: INTERNAL MEDICINE

## 2021-06-02 PROCEDURE — U0004 COV-19 TEST NON-CDC HGH THRU: HCPCS | Performed by: UROLOGY

## 2021-06-02 PROCEDURE — 93005 ELECTROCARDIOGRAM TRACING: CPT

## 2021-06-02 PROCEDURE — C9803 HOPD COVID-19 SPEC COLLECT: HCPCS | Performed by: UROLOGY

## 2021-06-02 PROCEDURE — 85025 COMPLETE CBC W/AUTO DIFF WBC: CPT | Performed by: UROLOGY

## 2021-06-02 NOTE — DISCHARGE INSTRUCTIONS
DAY OF SURGERY INSTRUCTIONS        YOUR SURGEON: Dr. Maninder Hadley     PROCEDURE: Right Extracorporeal Shockwave Lithotripsy     DATE OF SURGERY: 6/4/2021    ARRIVAL TIME: AS DIRECTED BY OFFICE    YOU MAY TAKE THE FOLLOWING MEDICATION(S) THE MORNING OF SURGERY WITH A SIP OF WATER: Atenolol Ok to take morning of procedure; Otherwise Nothing to eat or drink after midnight     ALL OTHER HOME MEDICATIONS CHECK WITH YOUR DOCTOR    DO NOT TAKE ANY ERECTILE DYSFUNCTION MEDICATIONS (EX:  CIALIS, VIAGRA) 24 HOURS PRIOR TO SURGERY              MANAGING PAIN AFTER SURGERY    We know you are probably wondering what your pain will be like after surgery.  Following surgery it is unrealistic to expect you will not have pain.   Pain is how our bodies let us know that something is wrong or cautions us to be careful.  That said, our goal is to make your pain tolerable.    Methods we may use to treat your pain include (oral or IV medications, PCAs, epidurals, nerve blocks, etc.)   While some procedures require IV pain medications for a short time after surgery, transitioning to pain medications by mouth allows for better management of pain.   Your nurse will encourage you to take oral pain medications whenever possible.  IV medications work almost immediately, but only last a short while.  Taking medications by mouth allows for a more constant level of medication in your blood stream for a longer period of time.      Once your pain is out of control it is harder to get back under control.  It is important you are aware when your next dose of pain medication is due.  If you are admitted, your nurse may write the time of your next dose on the white board in your room to help you remember.      We are interested in your pain and encourage you to inform us about aggravating factors during your visit.   Many times a simple repositioning every few hours can make a big difference.    If your physician says it is okay, do not let your pain  prevent you from getting out of bed. Be sure to call your nurse for assistance prior to getting up so you do not fall.      Before surgery, please decide your tolerable pain goal.  These faces help describe the pain ratings we use on a 0-10 scale.   Be prepared to tell us your goal and whether or not you take pain or anxiety medications at home.      BEFORE YOU COME TO THE HOSPITAL  (Pre-op instructions)  • Do not eat, drink, smoke or chew gum after midnight the night before surgery.  This also includes no mints.  • Morning of surgery take only the medicines you have been instructed with a sip of water unless otherwise instructed  by your physician.  • Do not shave, wear makeup or dark nail polish.  • Remove all jewelry including rings.  • Leave anything you consider valuable at home.  • Leave your suitcase in the car until after your surgery.  • Bring the following with you if applicable:  o Picture ID and insurance, Medicare or Medicaid cards  o Co-pay/deductible required by insurance (cash, check, credit card)  o Copy of advance directive, living will or power-of- documents if not brought to PAT  o CPAP or BIPAP mask and tubing  o Relaxation aids ( book, magazine), etc.  o Hearing aids                                 ON THE DAY OF SURGERY  · On the day of surgery check in at registration located at the main entrance of the hospital.   ? You will be registered and given a beeper with instructions where to wait in the main lobby.  ? When your beeper lights up and vibrates a member of the Outpatient Surgery staff will meet you at the double doors under the stair steps and escort you to your preoperative room.   · You may have cloth compression devices placed on your legs. These help to prevent blood clots and reduce swelling in your legs.  · An IV may be inserted into one of your veins.  · In the operating room, you may be given one or more of the following:  ? A medicine to help you relax (sedative).  ? A  "medicine to numb the area (local anesthetic).  ? A medicine to make you fall asleep (general anesthetic).  ? A medicine that is injected into an area of your body to numb everything below the injection site (regional anesthetic).  · Your surgical site will be marked or identified.  · You may be given an antibiotic through your IV to help prevent infection.  Contact a health care provider if you:  · Develop a fever of more than 100.4°F (38°C) or other feelings of illness during the 48 hours before your surgery.  · Have symptoms that get worse.  Have questions or concerns about your surgery    General Anesthesia/Surgery, Adult  General anesthesia is the use of medicines to make a person \"go to sleep\" (unconscious) for a medical procedure. General anesthesia must be used for certain procedures, and is often recommended for procedures that:  · Last a long time.  · Require you to be still or in an unusual position.  · Are major and can cause blood loss.  The medicines used for general anesthesia are called general anesthetics. As well as making you unconscious for a certain amount of time, these medicines:  · Prevent pain.  · Control your blood pressure.  · Relax your muscles.  Tell a health care provider about:  · Any allergies you have.  · All medicines you are taking, including vitamins, herbs, eye drops, creams, and over-the-counter medicines.  · Any problems you or family members have had with anesthetic medicines.  · Types of anesthetics you have had in the past.  · Any blood disorders you have.  · Any surgeries you have had.  · Any medical conditions you have.  · Any recent upper respiratory, chest, or ear infections.  · Any history of:  ? Heart or lung conditions, such as heart failure, sleep apnea, asthma, or chronic obstructive pulmonary disease (COPD).  ?  service.  ? Depression or anxiety.  · Any tobacco or drug use, including marijuana or alcohol use.  · Whether you are pregnant or may be " pregnant.  What are the risks?  Generally, this is a safe procedure. However, problems may occur, including:  · Allergic reaction.  · Lung and heart problems.  · Inhaling food or liquid from the stomach into the lungs (aspiration).  · Nerve injury.  · Air in the bloodstream, which can lead to stroke.  · Extreme agitation or confusion (delirium) when you wake up from the anesthetic.  · Waking up during your procedure and being unable to move. This is rare.  These problems are more likely to develop if you are having a major surgery or if you have an advanced or serious medical condition. You can prevent some of these complications by answering all of your health care provider's questions thoroughly and by following all instructions before your procedure.  General anesthesia can cause side effects, including:  · Nausea or vomiting.  · A sore throat from the breathing tube.  · Hoarseness.  · Wheezing or coughing.  · Shaking chills.  · Tiredness.  · Body aches.  · Anxiety.  · Sleepiness or drowsiness.  · Confusion or agitation.  RISKS AND COMPLICATIONS OF SURGERY  Your health care provider will discuss possible risks and complications with you before surgery. Common risks and complications include:    · Problems due to the use of anesthetics.  · Blood loss and replacement (does not apply to minor surgical procedures).  · Temporary increase in pain due to surgery.  · Uncorrected pain or problems that the surgery was meant to correct.  · Infection.  · New damage.    What happens before the procedure?    Medicines  Ask your health care provider about:  · Changing or stopping your regular medicines. This is especially important if you are taking diabetes medicines or blood thinners.  · Taking medicines such as aspirin and ibuprofen. These medicines can thin your blood. Do not take these medicines unless your health care provider tells you to take them.  · Taking over-the-counter medicines, vitamins, herbs, and supplements.  Do not take these during the week before your procedure unless your health care provider approves them.  General instructions  · Starting 3-6 weeks before the procedure, do not use any products that contain nicotine or tobacco, such as cigarettes and e-cigarettes. If you need help quitting, ask your health care provider.  · If you brush your teeth on the morning of the procedure, make sure to spit out all of the toothpaste.  · Tell your health care provider if you become ill or develop a cold, cough, or fever.  · If instructed by your health care provider, bring your sleep apnea device with you on the day of your surgery (if applicable).  · Ask your health care provider if you will be going home the same day, the following day, or after a longer hospital stay.  ? Plan to have someone take you home from the hospital or clinic.  ? Plan to have a responsible adult care for you for at least 24 hours after you leave the hospital or clinic. This is important.  What happens during the procedure?  · You will be given anesthetics through both of the following:  ? A mask placed over your nose and mouth.  ? An IV in one of your veins.  · You may receive a medicine to help you relax (sedative).  · After you are unconscious, a breathing tube may be inserted down your throat to help you breathe. This will be removed before you wake up.  · An anesthesia specialist will stay with you throughout your procedure. He or she will:  ? Keep you comfortable and safe by continuing to give you medicines and adjusting the amount of medicine that you get.  ? Monitor your blood pressure, pulse, and oxygen levels to make sure that the anesthetics do not cause any problems.  The procedure may vary among health care providers and hospitals.  What happens after the procedure?  · Your blood pressure, temperature, heart rate, breathing rate, and blood oxygen level will be monitored until the medicines you were given have worn off.  · You will wake up  in a recovery area. You may wake up slowly.  · If you feel anxious or agitated, you may be given medicine to help you calm down.  · If you will be going home the same day, your health care provider may check to make sure you can walk, drink, and urinate.  · Your health care provider will treat any pain or side effects you have before you go home.  · Do not drive for 24 hours if you were given a sedative.  Summary  · General anesthesia is used to keep you still and prevent pain during a procedure.  · It is important to tell your healthcare provider about your medical history and any surgeries you have had, and previous experience with anesthesia.  · Follow your healthcare provider’s instructions about when to stop eating, drinking, or taking certain medicines before your procedure.  · Plan to have someone take you home from the hospital or clinic.  This information is not intended to replace advice given to you by your health care provider. Make sure you discuss any questions you have with your health care provider.  Document Released: 03/26/2009 Document Revised: 08/03/2018 Document Reviewed: 08/03/2018  [employer] Interactive Patient Education © 2019 [employer]      Fall Prevention in Hospitals, Adult  As a hospital patient, your condition and the treatments you receive can increase your risk for falls. Some additional risk factors for falls in a hospital include:  · Being in an unfamiliar environment.  · Being on bed rest.  · Your surgery.  · Taking certain medicines.  · Your tubing requirements, such as intravenous (IV) therapy or catheters.  It is important that you learn how to decrease fall risks while at the hospital. Below are important tips that can help prevent falls.  SAFETY TIPS FOR PREVENTING FALLS  Talk about your risk of falling.  · Ask your health care provider why you are at risk for falling. Is it your medicine, illness, tubing placement, or something else?  · Make a plan with your health care  provider to keep you safe from falls.  · Ask your health care provider or pharmacist about side effects of your medicines. Some medicines can make you dizzy or affect your coordination.  Ask for help.  · Ask for help before getting out of bed. You may need to press your call button.  · Ask for assistance in getting safely to the toilet.  · Ask for a walker or cane to be put at your bedside. Ask that most of the side rails on your bed be placed up before your health care provider leaves the room.  · Ask family or friends to sit with you.  · Ask for things that are out of your reach, such as your glasses, hearing aids, telephone, bedside table, or call button.  Follow these tips to avoid falling:  · Stay lying or seated, rather than standing, while waiting for help.  · Wear rubber-soled slippers or shoes whenever you walk in the hospital.  · Avoid quick, sudden movements.  ¨ Change positions slowly.  ¨ Sit on the side of your bed before standing.  ¨ Stand up slowly and wait before you start to walk.  · Let your health care provider know if there is a spill on the floor.  · Pay careful attention to the medical equipment, electrical cords, and tubes around you.  · When you need help, use your call button by your bed or in the bathroom. Wait for one of your health care providers to help you.  · If you feel dizzy or unsure of your footing, return to bed and wait for assistance.  · Avoid being distracted by the TV, telephone, or another person in your room.  · Do not lean or support yourself on rolling objects, such as IV poles or bedside tables.     This information is not intended to replace advice given to you by your health care provider. Make sure you discuss any questions you have with your health care provider.     Document Released: 12/15/2001 Document Revised: 01/08/2016 Document Reviewed: 08/25/2013  [employer] Interactive Patient Education ©2016 [employer]            PATIENT/FAMILY/RESPONSIBLE PARTY VERBALIZES  UNDERSTANDING OF ABOVE EDUCATION.  COPY OF PAIN SCALE GIVEN AND REVIEWED WITH VERBALIZED UNDERSTANDING.

## 2021-06-04 ENCOUNTER — HOSPITAL ENCOUNTER (OUTPATIENT)
Facility: HOSPITAL | Age: 71
Setting detail: HOSPITAL OUTPATIENT SURGERY
Discharge: HOME | End: 2021-06-04
Attending: UROLOGY | Admitting: UROLOGY

## 2021-06-04 ENCOUNTER — ANESTHESIA EVENT (OUTPATIENT)
Dept: PERIOP | Facility: HOSPITAL | Age: 71
End: 2021-06-04

## 2021-06-04 ENCOUNTER — APPOINTMENT (OUTPATIENT)
Dept: GENERAL RADIOLOGY | Facility: HOSPITAL | Age: 71
End: 2021-06-04

## 2021-06-04 ENCOUNTER — ANESTHESIA (OUTPATIENT)
Dept: PERIOP | Facility: HOSPITAL | Age: 71
End: 2021-06-04

## 2021-06-04 VITALS
HEART RATE: 68 BPM | RESPIRATION RATE: 20 BRPM | OXYGEN SATURATION: 99 % | SYSTOLIC BLOOD PRESSURE: 181 MMHG | TEMPERATURE: 98 F | DIASTOLIC BLOOD PRESSURE: 85 MMHG

## 2021-06-04 DIAGNOSIS — N20.0 CALCULUS OF KIDNEY: ICD-10-CM

## 2021-06-04 LAB
QT INTERVAL: 396 MS
QTC INTERVAL: 401 MS

## 2021-06-04 PROCEDURE — 50590 FRAGMENTING OF KIDNEY STONE: CPT | Performed by: UROLOGY

## 2021-06-04 PROCEDURE — 25010000002 PROPOFOL 10 MG/ML EMULSION: Performed by: NURSE ANESTHETIST, CERTIFIED REGISTERED

## 2021-06-04 PROCEDURE — 25010000002 FENTANYL CITRATE (PF) 100 MCG/2ML SOLUTION: Performed by: NURSE ANESTHETIST, CERTIFIED REGISTERED

## 2021-06-04 PROCEDURE — 25010000002 ONDANSETRON PER 1 MG: Performed by: NURSE ANESTHETIST, CERTIFIED REGISTERED

## 2021-06-04 PROCEDURE — 74018 RADEX ABDOMEN 1 VIEW: CPT

## 2021-06-04 RX ORDER — FENTANYL CITRATE 50 UG/ML
25 INJECTION, SOLUTION INTRAMUSCULAR; INTRAVENOUS
Status: DISCONTINUED | OUTPATIENT
Start: 2021-06-04 | End: 2021-06-04 | Stop reason: HOSPADM

## 2021-06-04 RX ORDER — FLUMAZENIL 0.1 MG/ML
0.2 INJECTION INTRAVENOUS AS NEEDED
Status: DISCONTINUED | OUTPATIENT
Start: 2021-06-04 | End: 2021-06-04 | Stop reason: HOSPADM

## 2021-06-04 RX ORDER — SODIUM CHLORIDE, SODIUM LACTATE, POTASSIUM CHLORIDE, CALCIUM CHLORIDE 600; 310; 30; 20 MG/100ML; MG/100ML; MG/100ML; MG/100ML
1000 INJECTION, SOLUTION INTRAVENOUS CONTINUOUS
Status: DISCONTINUED | OUTPATIENT
Start: 2021-06-04 | End: 2021-06-04 | Stop reason: HOSPADM

## 2021-06-04 RX ORDER — LIDOCAINE HYDROCHLORIDE 10 MG/ML
0.5 INJECTION, SOLUTION EPIDURAL; INFILTRATION; INTRACAUDAL; PERINEURAL ONCE AS NEEDED
Status: DISCONTINUED | OUTPATIENT
Start: 2021-06-04 | End: 2021-06-04 | Stop reason: HOSPADM

## 2021-06-04 RX ORDER — FENTANYL CITRATE 50 UG/ML
INJECTION, SOLUTION INTRAMUSCULAR; INTRAVENOUS AS NEEDED
Status: DISCONTINUED | OUTPATIENT
Start: 2021-06-04 | End: 2021-06-04 | Stop reason: SURG

## 2021-06-04 RX ORDER — IBUPROFEN 600 MG/1
600 TABLET ORAL ONCE AS NEEDED
Status: DISCONTINUED | OUTPATIENT
Start: 2021-06-04 | End: 2021-06-04 | Stop reason: HOSPADM

## 2021-06-04 RX ORDER — NALOXONE HCL 0.4 MG/ML
0.4 VIAL (ML) INJECTION AS NEEDED
Status: DISCONTINUED | OUTPATIENT
Start: 2021-06-04 | End: 2021-06-04 | Stop reason: HOSPADM

## 2021-06-04 RX ORDER — MIDAZOLAM HYDROCHLORIDE 1 MG/ML
0.5 INJECTION INTRAMUSCULAR; INTRAVENOUS
Status: DISCONTINUED | OUTPATIENT
Start: 2021-06-04 | End: 2021-06-04 | Stop reason: HOSPADM

## 2021-06-04 RX ORDER — OXYCODONE AND ACETAMINOPHEN 7.5; 325 MG/1; MG/1
2 TABLET ORAL EVERY 4 HOURS PRN
Status: DISCONTINUED | OUTPATIENT
Start: 2021-06-04 | End: 2021-06-04 | Stop reason: HOSPADM

## 2021-06-04 RX ORDER — SODIUM CHLORIDE 0.9 % (FLUSH) 0.9 %
3 SYRINGE (ML) INJECTION EVERY 12 HOURS SCHEDULED
Status: DISCONTINUED | OUTPATIENT
Start: 2021-06-04 | End: 2021-06-04 | Stop reason: HOSPADM

## 2021-06-04 RX ORDER — PROPOFOL 10 MG/ML
VIAL (ML) INTRAVENOUS AS NEEDED
Status: DISCONTINUED | OUTPATIENT
Start: 2021-06-04 | End: 2021-06-04 | Stop reason: SURG

## 2021-06-04 RX ORDER — BUPIVACAINE HCL/0.9 % NACL/PF 0.1 %
2 PLASTIC BAG, INJECTION (ML) EPIDURAL ONCE
Status: DISCONTINUED | OUTPATIENT
Start: 2021-06-04 | End: 2021-06-04 | Stop reason: HOSPADM

## 2021-06-04 RX ORDER — SODIUM CHLORIDE 0.9 % (FLUSH) 0.9 %
3 SYRINGE (ML) INJECTION AS NEEDED
Status: DISCONTINUED | OUTPATIENT
Start: 2021-06-04 | End: 2021-06-04 | Stop reason: HOSPADM

## 2021-06-04 RX ORDER — ONDANSETRON 2 MG/ML
INJECTION INTRAMUSCULAR; INTRAVENOUS AS NEEDED
Status: DISCONTINUED | OUTPATIENT
Start: 2021-06-04 | End: 2021-06-04 | Stop reason: SURG

## 2021-06-04 RX ORDER — ACETAMINOPHEN 500 MG
1000 TABLET ORAL ONCE
Status: COMPLETED | OUTPATIENT
Start: 2021-06-04 | End: 2021-06-04

## 2021-06-04 RX ORDER — ONDANSETRON 2 MG/ML
4 INJECTION INTRAMUSCULAR; INTRAVENOUS ONCE AS NEEDED
Status: DISCONTINUED | OUTPATIENT
Start: 2021-06-04 | End: 2021-06-04 | Stop reason: HOSPADM

## 2021-06-04 RX ORDER — SODIUM CHLORIDE 9 MG/ML
100 INJECTION, SOLUTION INTRAVENOUS CONTINUOUS
Status: DISCONTINUED | OUTPATIENT
Start: 2021-06-04 | End: 2021-06-04 | Stop reason: HOSPADM

## 2021-06-04 RX ORDER — SODIUM CHLORIDE, SODIUM LACTATE, POTASSIUM CHLORIDE, CALCIUM CHLORIDE 600; 310; 30; 20 MG/100ML; MG/100ML; MG/100ML; MG/100ML
100 INJECTION, SOLUTION INTRAVENOUS CONTINUOUS
Status: DISCONTINUED | OUTPATIENT
Start: 2021-06-04 | End: 2021-06-04 | Stop reason: HOSPADM

## 2021-06-04 RX ORDER — ONDANSETRON 4 MG/1
4 TABLET, ORALLY DISINTEGRATING ORAL EVERY 6 HOURS PRN
Qty: 6 TABLET | Refills: 1 | Status: SHIPPED | OUTPATIENT
Start: 2021-06-04 | End: 2021-06-04 | Stop reason: SDUPTHER

## 2021-06-04 RX ORDER — HYDROCODONE BITARTRATE AND ACETAMINOPHEN 5; 325 MG/1; MG/1
1 TABLET ORAL EVERY 4 HOURS PRN
Qty: 18 TABLET | Refills: 0 | Status: SHIPPED | OUTPATIENT
Start: 2021-06-04

## 2021-06-04 RX ORDER — HYDROCODONE BITARTRATE AND ACETAMINOPHEN 5; 325 MG/1; MG/1
1 TABLET ORAL ONCE AS NEEDED
Status: DISCONTINUED | OUTPATIENT
Start: 2021-06-04 | End: 2021-06-04 | Stop reason: HOSPADM

## 2021-06-04 RX ORDER — SODIUM CHLORIDE 0.9 % (FLUSH) 0.9 %
3-10 SYRINGE (ML) INJECTION AS NEEDED
Status: DISCONTINUED | OUTPATIENT
Start: 2021-06-04 | End: 2021-06-04 | Stop reason: HOSPADM

## 2021-06-04 RX ORDER — OXYCODONE AND ACETAMINOPHEN 10; 325 MG/1; MG/1
1 TABLET ORAL ONCE AS NEEDED
Status: DISCONTINUED | OUTPATIENT
Start: 2021-06-04 | End: 2021-06-04 | Stop reason: HOSPADM

## 2021-06-04 RX ORDER — ONDANSETRON 4 MG/1
4 TABLET, ORALLY DISINTEGRATING ORAL EVERY 6 HOURS PRN
Qty: 6 TABLET | Refills: 1 | Status: SHIPPED | OUTPATIENT
Start: 2021-06-04

## 2021-06-04 RX ORDER — LABETALOL HYDROCHLORIDE 5 MG/ML
5 INJECTION, SOLUTION INTRAVENOUS
Status: DISCONTINUED | OUTPATIENT
Start: 2021-06-04 | End: 2021-06-04 | Stop reason: HOSPADM

## 2021-06-04 RX ORDER — HYDROCODONE BITARTRATE AND ACETAMINOPHEN 5; 325 MG/1; MG/1
1 TABLET ORAL EVERY 4 HOURS PRN
Qty: 18 TABLET | Refills: 0 | Status: SHIPPED | OUTPATIENT
Start: 2021-06-04 | End: 2021-06-04 | Stop reason: SDUPTHER

## 2021-06-04 RX ADMIN — HYDROCODONE BITARTRATE AND ACETAMINOPHEN 1 TABLET: 5; 325 TABLET ORAL at 12:47

## 2021-06-04 RX ADMIN — SODIUM CHLORIDE, POTASSIUM CHLORIDE, SODIUM LACTATE AND CALCIUM CHLORIDE 1000 ML: 600; 310; 30; 20 INJECTION, SOLUTION INTRAVENOUS at 08:05

## 2021-06-04 RX ADMIN — ACETAMINOPHEN 1000 MG: 500 TABLET, FILM COATED ORAL at 10:24

## 2021-06-04 RX ADMIN — ONDANSETRON 4 MG: 2 INJECTION INTRAMUSCULAR; INTRAVENOUS at 11:36

## 2021-06-04 RX ADMIN — VASOPRESSIN 1 ML: 20 INJECTION INTRAVENOUS at 11:11

## 2021-06-04 RX ADMIN — PROPOFOL 200 MG: 10 INJECTION, EMULSION INTRAVENOUS at 10:52

## 2021-06-04 RX ADMIN — VASOPRESSIN 1 ML: 20 INJECTION INTRAVENOUS at 11:25

## 2021-06-04 RX ADMIN — FENTANYL CITRATE 100 MCG: 50 INJECTION, SOLUTION INTRAMUSCULAR; INTRAVENOUS at 10:52

## 2021-06-04 NOTE — DISCHARGE INSTR - APPOINTMENTS
[facility] Scheduling Department will call you with your ultrasound appointment date and time.

## 2021-06-04 NOTE — DISCHARGE INSTRUCTIONS

## 2021-06-04 NOTE — ANESTHESIA PREPROCEDURE EVALUATION
Anesthesia Evaluation     Patient summary reviewed and Nursing notes reviewed   no history of anesthetic complications:  NPO Solid Status: > 8 hours  NPO Liquid Status: > 8 hours           Airway   Mallampati: II  TM distance: >3 FB  Neck ROM: full  Dental    (+) upper dentures and lower dentures    Pulmonary    (-) asthma, sleep apnea, not a smoker  Cardiovascular   Exercise tolerance: good (4-7 METS)    ECG reviewed  Patient on routine beta blocker and Beta blocker given within 24 hours of surgery    (+) hypertension,   (-) pacemaker, past MI, angina, cardiac stents      Neuro/Psych  (-) seizures, TIA, CVA  GI/Hepatic/Renal/Endo    (+) obesity,   renal disease stones,   (-) GERD, hepatitis, liver disease, diabetes    Musculoskeletal     Abdominal    Substance History      OB/GYN          Other   arthritis,                        Anesthesia Plan    ASA 2     general     intravenous induction     Anesthetic plan, all risks, benefits, and alternatives have been provided, discussed and informed consent has been obtained with: patient.

## 2021-06-04 NOTE — ANESTHESIA POSTPROCEDURE EVALUATION
Patient: Martin Durán    Procedure Summary     Date: 06/04/21 Room / Location:  [facility] OR  /  [facility]    Anesthesia Start: 1048 Anesthesia Stop: 1148    Procedure: RIGHT EXTRACORPOREAL SHOCKWAVE LITHOTRIPSY (Right ) Diagnosis:       Calculus of kidney      (Calculus of kidney [N20.0])    Surgeons: Maninder Hadley MD Provider: Julio César Fung CRNA    Anesthesia Type: general ASA Status: 2          Anesthesia Type: general    Vitals  Vitals Value Taken Time   /82 06/04/21 1215   Temp 98 °F (36.7 °C) 06/04/21 1215   Pulse 59 06/04/21 1217   Resp 14 06/04/21 1200   SpO2 100 % 06/04/21 1217   Vitals shown include unvalidated device data.        Post Anesthesia Care and Evaluation    Patient location during evaluation: PACU  Patient participation: complete - patient participated  Level of consciousness: awake and alert  Pain management: adequate  Airway patency: patent  Anesthetic complications: No anesthetic complications    Cardiovascular status: acceptable  Respiratory status: acceptable  Hydration status: acceptable    Comments: Blood pressure 171/85, pulse 65, temperature 98 °F (36.7 °C), temperature source Temporal, resp. rate 20, SpO2 100 %.    Pt discharged from PACU based on yumiko score >8

## 2021-06-04 NOTE — ANESTHESIA PROCEDURE NOTES
Airway  Date/Time: 6/4/2021 10:53 AM    General Information and Staff    CRNA: Julio César Fung CRNA    Indications and Patient Condition  Indications for airway management: CNS depression    Preoxygenated: yes  Mask difficulty assessment: 0 - not attempted    Final Airway Details  Final airway type: supraglottic airway      Successful airway: I-gel  Size 5    Number of attempts at approach: 1  Assessment: lips, teeth, and gum same as pre-op and atraumatic intubation    Additional Comments  Atraumatic Insertion

## 2021-06-04 NOTE — OP NOTE
OP NOTE  Martin Durán    Date of Procedure: 6/4/2021    Pre-op Diagnosis:   Calculus of kidney [N20.0]    Post-op Diagnosis:     Post-Op Diagnosis Codes:     * Calculus of kidney [N20.0]    Procedure/CPT® Codes:      Procedure(s):  RIGHT EXTRACORPOREAL SHOCKWAVE LITHOTRIPSY    Surgeon(s):  Maninder Hadley MD    Anesthesia: General    Staff:   Circulator: Yamilex Aranda RN; Martha Boyce RN    Indications:   71-year-old male with 9 mm right kidney stone.   After discussion of options, patient has given informed consent to undergo right ESWL.  All risks, benefits, and alternatives were discussed.    Operative Report: The patient is identified. The KUB is reviewed. After answering any questions, patient was brought to the operating room and placed on the patient table of the [facility].  General endotracheal anesthesia was administered.  Preoperative KUB was reviewed.   An C c-arm fluoroscope was used to identify the stone.    The shock-head is brought into position.  This stone is brought into the F2 plane for focus.  Treatment was initiated.  We gradually increased the energy level from 1 to 7.  This stone was treated at a rate of 60 for the first 300 shocks followed by rate 90.  We paused for 2 minutes after 300 shocks to reduce risk of renal damage.  The stone was periodically checked to make sure that we were on it and getting good breakup.  We checked at 700, 1000, 1500, 2000, and 2500 shocks.  The stone did have good breakup.  I felt it was unnecessary to place a ureteral stent.  At the conclusion of 3000 shocks, the patient was awakened from anesthesia and transferred to the recovery room in satisfactory condition.      Estimated Blood Loss: <30 mL    Specimens:                None      Drains: * No LDAs found *    Complications: none      Maninder Hadley MD     Date: 6/4/2021  Time: 11:22 CDT

## 2021-09-08 NOTE — PROGRESS NOTES
Subjective    Mr. Durán is 71 y.o. male    Chief Complaint: Kidney stone    History of Present Illness  71-year-old male established patient follow-up status post right ESWL 6/4/2021 for 9 mm right kidney stone.  He is doing well without flank pain or hematuria.  He has known left-sided kidney stones remaining.  Renal ultrasound done today reviewed by me with the patient shows echogenic focus in left lower pole consistent with his known kidney stone, no hydronephrosis present bilaterally.  LUTS are currently controlled on tamsulosin.  Last PSA on 11/30/2020 was normal 0.72.    I independently visualized and reviewed the patient's prior imaging studies today in clinic and discussed the imaging findings with the patient.        The following portions of the patient's history were reviewed and updated as appropriate: allergies, current medications, past family history, past medical history, past social history, past surgical history and problem list.    Review of Systems   Constitutional: Negative for chills and fever.   Gastrointestinal: Negative for abdominal pain, anal bleeding and blood in stool.   Genitourinary: Negative for dysuria and hematuria.         Current Outpatient Medications:   •  allopurinol (ZYLOPRIM) 300 MG tablet, Take 300 mg by mouth Daily., Disp: , Rfl: 3  •  amLODIPine (NORVASC) 5 MG tablet, Take 5 mg by mouth Daily., Disp: , Rfl: 5  •  atenolol (TENORMIN) 50 MG tablet, Take 50 mg by mouth Daily., Disp: , Rfl: 3  •  meloxicam (MOBIC) 7.5 MG tablet, Take 7.5 mg by mouth Daily. Stopped prior to surgery on 6/1/17, Disp: , Rfl:   •  Multiple Vitamins-Minerals (MULTIVITAMIN ADULT PO), Take 1 capsule by mouth Daily., Disp: , Rfl:   •  tamsulosin (FLOMAX) 0.4 MG capsule 24 hr capsule, Take 1 capsule by mouth Daily., Disp: 90 capsule, Rfl: 5  •  HYDROcodone-acetaminophen (NORCO) 5-325 MG per tablet, Take 1 tablet by mouth Every 4 (Four) Hours As Needed (postop pain)., Disp: 18 tablet, Rfl: 0  •   ondansetron ODT (Zofran ODT) 4 MG disintegrating tablet, Place 1 tablet on the tongue Every 6 (Six) Hours As Needed for Nausea., Disp: 6 tablet, Rfl: 1    Past Medical History:   Diagnosis Date   • Arthritis     gout   • BPH with urinary obstruction    • Calculus of kidney    • Calculus of ureter    • Hearing loss    • Hypertension    • Kidney stones    • Microscopic hematuria    • Nocturia        Past Surgical History:   Procedure Laterality Date   • CARDIAC CATHETERIZATION     • CARPAL TUNNEL RELEASE     • COLONOSCOPY     • EXTRACORPOREAL SHOCK WAVE LITHOTRIPSY (ESWL) Left 3/1/2017    Procedure: LEFT EXTRACORPOREAL SHOCKWAVE LITHOTRIPSY;  Surgeon: Emanuel Franco MD;  Location:  [facility] OR;  Service:    • EXTRACORPOREAL SHOCK WAVE LITHOTRIPSY (ESWL) Left 2017    Procedure: EXTRACORPOREAL SHOCKWAVE LITHOTRIPSY;  Surgeon: Emanuel Franco MD;  Location: [facility] OR;  Service:    • EXTRACORPOREAL SHOCK WAVE LITHOTRIPSY (ESWL) Left 10/28/2019    Procedure: LEFT EXTRACORPOREAL SHOCKWAVE LITHOTRIPSY;  Surgeon: Maninder Hadley MD;  Location:  [facility] OR;  Service: Urology   • EXTRACORPOREAL SHOCK WAVE LITHOTRIPSY (ESWL) Right 2021    Procedure: RIGHT EXTRACORPOREAL SHOCKWAVE LITHOTRIPSY;  Surgeon: Maninder Hadley MD;  Location:  [facility] OR;  Service: Urology;  Laterality: Right;   • EXTRACORPOREAL SHOCKWAVE LITHOTRIPSY (ESWL), STENT INSERTION/REMOVAL     • HEMORRHOIDECTOMY     • KNEE SURGERY Left     arthroscopy       Social History     Socioeconomic History   • Marital status:      Spouse name: Not on file   • Number of children: Not on file   • Years of education: Not on file   • Highest education level: Not on file   Tobacco Use   • Smoking status: Former Smoker     Types: Pipe     Quit date: 1973     Years since quittin.7   • Smokeless tobacco: Never Used   • Tobacco comment: only a pipe smoker and quit many years ago   Vaping Use   • Vaping Use: Never used   Substance and Sexual  "Activity   • Alcohol use: No   • Drug use: No   • Sexual activity: Defer       Family History   Problem Relation Age of Onset   • No Known Problems Father    • No Known Problems Mother        Objective    Temp 97.5 °F (36.4 °C)   Ht 188 cm (74\")   Wt 113 kg (250 lb)   BMI 32.10 kg/m²     Physical Exam        Results for orders placed or performed in visit on 06/02/21   ECG 12 Lead   Result Value Ref Range    QT Interval 396 ms    QTC Interval 401 ms     Assessment and Plan    Diagnoses and all orders for this visit:    1. Calculus of kidney (Primary)  -     XR abdomen kub; Future    2. Urine frequency  -     Cancel: POC Urinalysis Dipstick, Multipro  -     US renal bilateral; Future    3. BPH with obstruction/lower urinary tract symptoms    4. Essential hypertension      Doing well appears stone free on the right after right ESWL.  He will follow-up with me after the holidays with a preclinic [facility] to reevaluate his left sided kidney stones.  Continue tamsulosin for his enlarged prostate.        This document has been signed by HORACE Hadley MD on September 10, 2021 17:41 CDT        "

## 2021-09-09 ENCOUNTER — HOSPITAL ENCOUNTER (OUTPATIENT)
Dept: ULTRASOUND IMAGING | Facility: HOSPITAL | Age: 71
Discharge: HOME | End: 2021-09-09
Admitting: UROLOGY

## 2021-09-09 ENCOUNTER — OFFICE VISIT (OUTPATIENT)
Dept: UROLOGY | Facility: CLINIC | Age: 71
End: 2021-09-09

## 2021-09-09 VITALS — BODY MASS INDEX: 32.08 KG/M2 | TEMPERATURE: 97.5 F | HEIGHT: 74 IN | WEIGHT: 250 LBS

## 2021-09-09 DIAGNOSIS — N13.8 BPH WITH OBSTRUCTION/LOWER URINARY TRACT SYMPTOMS: ICD-10-CM

## 2021-09-09 DIAGNOSIS — Z87.442 HISTORY OF KIDNEY STONES: ICD-10-CM

## 2021-09-09 DIAGNOSIS — R35.0 URINE FREQUENCY: ICD-10-CM

## 2021-09-09 DIAGNOSIS — N40.1 BPH WITH OBSTRUCTION/LOWER URINARY TRACT SYMPTOMS: ICD-10-CM

## 2021-09-09 DIAGNOSIS — N20.0 CALCULUS OF KIDNEY: Primary | ICD-10-CM

## 2021-09-09 DIAGNOSIS — I10 ESSENTIAL HYPERTENSION: ICD-10-CM

## 2021-09-09 PROCEDURE — 76775 US EXAM ABDO BACK WALL LIM: CPT

## 2021-09-09 PROCEDURE — 99214 OFFICE O/P EST MOD 30 MIN: CPT | Performed by: UROLOGY

## 2022-01-04 NOTE — PROGRESS NOTES
Subjective    Mr. Durán is 71 y.o. male    Chief Complaint: Calculus of kidney     History of Present Illness    71-year-old male established patient follow-up status post right ESWL 6/4/2021 for 9 mm right kidney stone.  He is doing well without flank pain or hematuria.  His LUTS are well controlled on tamsulosin. He has known left-sided kidney stones remaining measuring 7-8 mm and 4 mm on my review on KUB x-ray done today reviewed by me with the patient.   Last PSA on 11/30/2020 was normal 0.72.     I independently visualized and reviewed the patient's prior imaging studies today in clinic and discussed the imaging findings with the patient.     The following portions of the patient's history were reviewed and updated as appropriate: allergies, current medications, past family history, past medical history, past social history, past surgical history and problem list.    Review of Systems      Current Outpatient Medications:   •  allopurinol (ZYLOPRIM) 300 MG tablet, Take 300 mg by mouth Daily., Disp: , Rfl: 3  •  amLODIPine (NORVASC) 5 MG tablet, Take 5 mg by mouth Daily., Disp: , Rfl: 5  •  atenolol (TENORMIN) 50 MG tablet, Take 50 mg by mouth Daily., Disp: , Rfl: 3  •  meloxicam (MOBIC) 7.5 MG tablet, Take 7.5 mg by mouth Daily. Stopped prior to surgery on 6/1/17, Disp: , Rfl:   •  Multiple Vitamins-Minerals (MULTIVITAMIN ADULT PO), Take 1 capsule by mouth Daily., Disp: , Rfl:   •  ondansetron ODT (Zofran ODT) 4 MG disintegrating tablet, Place 1 tablet on the tongue Every 6 (Six) Hours As Needed for Nausea., Disp: 6 tablet, Rfl: 1  •  tamsulosin (FLOMAX) 0.4 MG capsule 24 hr capsule, Take 1 capsule by mouth Daily., Disp: 90 capsule, Rfl: 5  •  HYDROcodone-acetaminophen (NORCO) 5-325 MG per tablet, Take 1 tablet by mouth Every 4 (Four) Hours As Needed (postop pain)., Disp: 18 tablet, Rfl: 0    Past Medical History:   Diagnosis Date   • Arthritis     gout   • BPH with urinary obstruction    • Calculus of kidney   "  • Calculus of ureter    • Hearing loss    • Hypertension    • Kidney stones    • Microscopic hematuria    • Nocturia        Past Surgical History:   Procedure Laterality Date   • CARDIAC CATHETERIZATION     • CARPAL TUNNEL RELEASE     • COLONOSCOPY     • EXTRACORPOREAL SHOCK WAVE LITHOTRIPSY (ESWL) Left 3/1/2017    Procedure: LEFT EXTRACORPOREAL SHOCKWAVE LITHOTRIPSY;  Surgeon: Emanuel Franco MD;  Location:  [facility];  Service:    • EXTRACORPOREAL SHOCK WAVE LITHOTRIPSY (ESWL) Left 2017    Procedure: EXTRACORPOREAL SHOCKWAVE LITHOTRIPSY;  Surgeon: Emanuel Franco MD;  Location:  [facility];  Service:    • EXTRACORPOREAL SHOCK WAVE LITHOTRIPSY (ESWL) Left 10/28/2019    Procedure: LEFT EXTRACORPOREAL SHOCKWAVE LITHOTRIPSY;  Surgeon: Maninder Hadley MD;  Location:  [facility];  Service: Urology   • EXTRACORPOREAL SHOCK WAVE LITHOTRIPSY (ESWL) Right 2021    Procedure: RIGHT EXTRACORPOREAL SHOCKWAVE LITHOTRIPSY;  Surgeon: Maninder Hadley MD;  Location:  [facility];  Service: Urology;  Laterality: Right;   • EXTRACORPOREAL SHOCKWAVE LITHOTRIPSY (ESWL), STENT INSERTION/REMOVAL     • HEMORRHOIDECTOMY     • KNEE SURGERY Left     arthroscopy       Social History     Socioeconomic History   • Marital status:    Tobacco Use   • Smoking status: Former Smoker     Types: Pipe     Quit date: 1973     Years since quittin.0   • Smokeless tobacco: Never Used   • Tobacco comment: only a pipe smoker and quit many years ago   Vaping Use   • Vaping Use: Never used   Substance and Sexual Activity   • Alcohol use: No   • Drug use: No   • Sexual activity: Defer       Family History   Problem Relation Age of Onset   • No Known Problems Father    • No Known Problems Mother        Objective    Temp 97.6 °F (36.4 °C)   Ht 188 cm (74\")   Wt 111 kg (245 lb 3.2 oz)   BMI 31.48 kg/m²     Physical Exam        Results for orders placed or performed in visit on 01/10/22   POC Urinalysis Dipstick, Multipro    " Specimen: Urine   Result Value Ref Range    Color Yellow Yellow, Straw, Dark Yellow, Adrianne    Clarity, UA Clear Clear    Glucose,  mg/dL (A) Negative, 1000 mg/dL (3+) mg/dL    Bilirubin Negative Negative    Ketones, UA Negative Negative    Specific Gravity  1.030 1.005 - 1.030    Blood, UA Negative Negative    pH, Urine 6.0 5.0 - 8.0    Protein, POC Negative Negative mg/dL    Urobilinogen, UA Normal Normal    Nitrite, UA Negative Negative    Leukocytes Negative Negative     Assessment and Plan    Diagnoses and all orders for this visit:    1. Calculus of kidney (Primary)  -     POC Urinalysis Dipstick, Multipro  -     XR abdomen kub; Future    2. BPH with obstruction/lower urinary tract symptoms  -     tamsulosin (FLOMAX) 0.4 MG capsule 24 hr capsule; Take 1 capsule by mouth Daily.  Dispense: 90 capsule; Refill: 5         Doing well appears stone free on the right after right ESWL.  Asymptomatic left sided kidney stones.  He wishes to observe these for now.  Continue tamsulosin.  We did discuss his small amount of glucose and on his urinalysis today for which she will follow-up with Dr. Russell.  He will follow-up with me in 6 months with pre-clinic [facility] or sooner as needed.       This document has been signed by HORACE Hadley MD on January 10, 2022 17:13 CST

## 2022-01-07 ENCOUNTER — TELEPHONE (OUTPATIENT)
Dept: UROLOGY | Facility: CLINIC | Age: 72
End: 2022-01-07

## 2022-01-10 ENCOUNTER — HOSPITAL ENCOUNTER (OUTPATIENT)
Dept: GENERAL RADIOLOGY | Facility: HOSPITAL | Age: 72
Discharge: HOME | End: 2022-01-10
Admitting: UROLOGY

## 2022-01-10 ENCOUNTER — OFFICE VISIT (OUTPATIENT)
Dept: UROLOGY | Facility: CLINIC | Age: 72
End: 2022-01-10

## 2022-01-10 VITALS — WEIGHT: 245.2 LBS | HEIGHT: 74 IN | TEMPERATURE: 97.6 F | BODY MASS INDEX: 31.47 KG/M2

## 2022-01-10 DIAGNOSIS — N40.1 BPH WITH OBSTRUCTION/LOWER URINARY TRACT SYMPTOMS: ICD-10-CM

## 2022-01-10 DIAGNOSIS — N13.8 BPH WITH OBSTRUCTION/LOWER URINARY TRACT SYMPTOMS: ICD-10-CM

## 2022-01-10 DIAGNOSIS — N20.0 CALCULUS OF KIDNEY: Primary | ICD-10-CM

## 2022-01-10 DIAGNOSIS — N20.0 CALCULUS OF KIDNEY: ICD-10-CM

## 2022-01-10 LAB
BILIRUB BLD-MCNC: NEGATIVE MG/DL
CLARITY, POC: CLEAR
COLOR UR: YELLOW
GLUCOSE UR STRIP-MCNC: ABNORMAL MG/DL
KETONES UR QL: NEGATIVE
LEUKOCYTE EST, POC: NEGATIVE
NITRITE UR-MCNC: NEGATIVE MG/ML
PH UR: 6 [PH] (ref 5–8)
PROT UR STRIP-MCNC: NEGATIVE MG/DL
RBC # UR STRIP: NEGATIVE /UL
SP GR UR: 1.03 (ref 1–1.03)
UROBILINOGEN UR QL: NORMAL

## 2022-01-10 PROCEDURE — 74018 RADEX ABDOMEN 1 VIEW: CPT

## 2022-01-10 PROCEDURE — 81001 URINALYSIS AUTO W/SCOPE: CPT | Performed by: UROLOGY

## 2022-01-10 PROCEDURE — 99214 OFFICE O/P EST MOD 30 MIN: CPT | Performed by: UROLOGY

## 2022-01-10 RX ORDER — TAMSULOSIN HYDROCHLORIDE 0.4 MG/1
1 CAPSULE ORAL DAILY
Qty: 90 CAPSULE | Refills: 5 | Status: SHIPPED | OUTPATIENT
Start: 2022-01-10 | End: 2023-01-16 | Stop reason: SDUPTHER

## 2022-01-10 NOTE — PATIENT INSTRUCTIONS
"BMI for Adults  What is BMI?  Body mass index (BMI) is a number that is calculated from a person's weight and height. BMI can help estimate how much of a person's weight is composed of fat. BMI does not measure body fat directly. Rather, it is an alternative to procedures that directly measure body fat, which can be difficult and expensive.  BMI can help identify people who may be at higher risk for certain medical problems.  What are BMI measurements used for?  BMI is used as a screening tool to identify possible weight problems. It helps determine whether a person is obese, overweight, a healthy weight, or underweight.  BMI is useful for:  · Identifying a weight problem that may be related to a medical condition or may increase the risk for medical problems.  · Promoting changes, such as changes in diet and exercise, to help reach a healthy weight. BMI screening can be repeated to see if these changes are working.  How is BMI calculated?  BMI involves measuring your weight in relation to your height. Both height and weight are measured, and the BMI is calculated from those numbers. This can be done either in English (U[place] or metric measurements. Note that charts and online BMI calculators are available to help you find your BMI quickly and easily without having to do these calculations yourself.  To calculate your BMI in English (U[place] measurements:    1. Measure your weight in pounds (lb).  2. Multiply the number of pounds by 703.  ? For example, for a person who weighs 180 lb, multiply that number by 703, which equals 126,540.  3. Measure your height in inches. Then multiply that number by itself to get a measurement called \"inches squared.\"  ? For example, for a person who is 70 inches tall, the \"inches squared\" measurement is 70 inches x 70 inches, which equals 4,900 inches squared.  4. Divide the total from step 2 (number of lb x 703) by the total from step 3 (inches squared): 126,540 ÷ 4,900 = 25.8. This is " "your BMI.    To calculate your BMI in metric measurements:  1. Measure your weight in kilograms (kg).  2. Measure your height in meters (m). Then multiply that number by itself to get a measurement called \"meters squared.\"  ? For example, for a person who is 1.75 m tall, the \"meters squared\" measurement is 1.75 m x 1.75 m, which is equal to 3.1 meters squared.  3. Divide the number of kilograms (your weight) by the meters squared number. In this example: 70 ÷ 3.1 = 22.6. This is your BMI.  What do the results mean?  BMI charts are used to identify whether you are underweight, normal weight, overweight, or obese. The following guidelines will be used:  · Underweight: BMI less than 18.5.  · Normal weight: BMI between 18.5 and 24.9.  · Overweight: BMI between 25 and 29.9.  · Obese: BMI of 30 or above.  Keep these notes in mind:  · Weight includes both fat and muscle, so someone with a muscular build, such as an athlete, may have a BMI that is higher than 24.9. In cases like these, BMI is not an accurate measure of body fat.  · To determine if excess body fat is the cause of a BMI of 25 or higher, further assessments may need to be done by a health care provider.  · BMI is usually interpreted in the same way for men and women.  Where to find more information  For more information about BMI, including tools to quickly calculate your BMI, go to these websites:  · Centers for Disease Control and Prevention: www.cdc.gov  · American Heart Association: www.heart.org  · National Heart, Lung, and Blood [place]: www.nhlbi.nih.gov  Summary  · Body mass index (BMI) is a number that is calculated from a person's weight and height.  · BMI may help estimate how much of a person's weight is composed of fat. BMI can help identify those who may be at higher risk for certain medical problems.  · BMI can be measured using English measurements or metric measurements.  · BMI charts are used to identify whether you are underweight, normal " weight, overweight, or obese.  This information is not intended to replace advice given to you by your health care provider. Make sure you discuss any questions you have with your health care provider.  Document Revised: 09/09/2020 Document Reviewed: 07/17/2020  Elsevier Patient Education © 2021 [employer]

## 2022-02-17 LAB
ALBUMIN SERPL-MCNC: 4 G/DL (ref 3.5–5.2)
ALP BLD-CCNC: 111 U/L (ref 40–130)
ALT SERPL-CCNC: 27 U/L (ref 5–41)
ANION GAP SERPL CALCULATED.3IONS-SCNC: 11 MMOL/L (ref 7–19)
AST SERPL-CCNC: 32 U/L (ref 5–40)
BASOPHILS ABSOLUTE: 0 K/UL (ref 0–0.2)
BASOPHILS RELATIVE PERCENT: 0.6 % (ref 0–1)
BILIRUB SERPL-MCNC: 0.8 MG/DL (ref 0.2–1.2)
BUN BLDV-MCNC: 21 MG/DL (ref 8–23)
CALCIUM SERPL-MCNC: 9.7 MG/DL (ref 8.8–10.2)
CHLORIDE BLD-SCNC: 105 MMOL/L (ref 98–111)
CHOLESTEROL, TOTAL: 114 MG/DL (ref 160–199)
CO2: 26 MMOL/L (ref 22–29)
CREAT SERPL-MCNC: 1 MG/DL (ref 0.5–1.2)
EOSINOPHILS ABSOLUTE: 0.1 K/UL (ref 0–0.6)
EOSINOPHILS RELATIVE PERCENT: 3.2 % (ref 0–5)
GFR AFRICAN AMERICAN: >59
GFR NON-AFRICAN AMERICAN: >60
GLUCOSE BLD-MCNC: 104 MG/DL (ref 74–109)
HBA1C MFR BLD: 5.6 % (ref 4–6)
HCT VFR BLD CALC: 40 % (ref 42–52)
HDLC SERPL-MCNC: 39 MG/DL (ref 55–121)
HEMOGLOBIN: 12.8 G/DL (ref 14–18)
IMMATURE GRANULOCYTES #: 0 K/UL
LDL CHOLESTEROL CALCULATED: 67 MG/DL
LYMPHOCYTES ABSOLUTE: 0.9 K/UL (ref 1.1–4.5)
LYMPHOCYTES RELATIVE PERCENT: 29.8 % (ref 20–40)
MCH RBC QN AUTO: 30.8 PG (ref 27–31)
MCHC RBC AUTO-ENTMCNC: 32 G/DL (ref 33–37)
MCV RBC AUTO: 96.4 FL (ref 80–94)
MONOCYTES ABSOLUTE: 0.3 K/UL (ref 0–0.9)
MONOCYTES RELATIVE PERCENT: 9.6 % (ref 0–10)
NEUTROPHILS ABSOLUTE: 1.8 K/UL (ref 1.5–7.5)
NEUTROPHILS RELATIVE PERCENT: 56.8 % (ref 50–65)
PDW BLD-RTO: 13.7 % (ref 11.5–14.5)
PLATELET # BLD: 122 K/UL (ref 130–400)
PMV BLD AUTO: 10.8 FL (ref 9.4–12.4)
POTASSIUM SERPL-SCNC: 4.3 MMOL/L (ref 3.5–5)
PROSTATE SPECIFIC ANTIGEN: 0.68 NG/ML (ref 0–4)
RBC # BLD: 4.15 M/UL (ref 4.7–6.1)
SODIUM BLD-SCNC: 142 MMOL/L (ref 136–145)
TOTAL PROTEIN: 7.3 G/DL (ref 6.6–8.7)
TRIGL SERPL-MCNC: 40 MG/DL (ref 0–149)
TSH SERPL DL<=0.05 MIU/L-ACNC: 1.11 UIU/ML (ref 0.27–4.2)
WBC # BLD: 3.1 K/UL (ref 4.8–10.8)

## 2022-07-19 NOTE — PROGRESS NOTES
Subjective    Mr. Durán is 72 y.o. male    Chief Complaint: Calculus of kidney    History of Present Illness       72-year-old male established patient follow-up for kidney stones.  He also has new problem of recent episode of gross hematuria.  This was painless.  He denies flank pain associated with this.  He has history of hematuria in 2017 at which time he was found to have ureteral stone.  He had right ESWL 6/4/2021 for 9 mm right kidney stone.  KUB x-ray done today reviewed by me with the patient shows bilateral kidney stones, largest being 5 mm on the left.  A previous calcification left kidney on previous x-ray is not well visualized today.  His LUTS are well controlled on tamsulosin.   Last PSA on 2/17/2022 was normal at 0.68.     I independently visualized and reviewed the patient's prior imaging studies today in clinic and discussed the imaging findings with the patient.    The following portions of the patient's history were reviewed and updated as appropriate: allergies, current medications, past family history, past medical history, past social history, past surgical history and problem list.    Review of Systems      Current Outpatient Medications:   •  allopurinol (ZYLOPRIM) 300 MG tablet, Take 300 mg by mouth Daily., Disp: , Rfl: 3  •  amLODIPine (NORVASC) 5 MG tablet, Take 5 mg by mouth Daily., Disp: , Rfl: 5  •  atenolol (TENORMIN) 50 MG tablet, Take 50 mg by mouth Daily., Disp: , Rfl: 3  •  HYDROcodone-acetaminophen (NORCO) 5-325 MG per tablet, Take 1 tablet by mouth Every 4 (Four) Hours As Needed (postop pain)., Disp: 18 tablet, Rfl: 0  •  meloxicam (MOBIC) 7.5 MG tablet, Take 7.5 mg by mouth Daily. Stopped prior to surgery on 6/1/17, Disp: , Rfl:   •  Multiple Vitamins-Minerals (MULTIVITAMIN ADULT PO), Take 1 capsule by mouth Daily., Disp: , Rfl:   •  ondansetron ODT (Zofran ODT) 4 MG disintegrating tablet, Place 1 tablet on the tongue Every 6 (Six) Hours As Needed for Nausea., Disp: 6 tablet,  Rfl: 1  •  tamsulosin (FLOMAX) 0.4 MG capsule 24 hr capsule, Take 1 capsule by mouth Daily., Disp: 90 capsule, Rfl: 5    Past Medical History:   Diagnosis Date   • Arthritis     gout   • BPH with urinary obstruction    • Calculus of kidney    • Calculus of ureter    • Hearing loss    • Hypertension    • Kidney stones    • Microscopic hematuria    • Nocturia        Past Surgical History:   Procedure Laterality Date   • CARDIAC CATHETERIZATION     • CARPAL TUNNEL RELEASE     • COLONOSCOPY     • EXTRACORPOREAL SHOCK WAVE LITHOTRIPSY (ESWL) Left 3/1/2017    Procedure: LEFT EXTRACORPOREAL SHOCKWAVE LITHOTRIPSY;  Surgeon: Emanuel Franco MD;  Location:  [facility];  Service:    • EXTRACORPOREAL SHOCK WAVE LITHOTRIPSY (ESWL) Left 2017    Procedure: EXTRACORPOREAL SHOCKWAVE LITHOTRIPSY;  Surgeon: Emanuel Franco MD;  Location:  [facility];  Service:    • EXTRACORPOREAL SHOCK WAVE LITHOTRIPSY (ESWL) Left 10/28/2019    Procedure: LEFT EXTRACORPOREAL SHOCKWAVE LITHOTRIPSY;  Surgeon: Maninder Hadley MD;  Location:  [facility];  Service: Urology   • EXTRACORPOREAL SHOCK WAVE LITHOTRIPSY (ESWL) Right 2021    Procedure: RIGHT EXTRACORPOREAL SHOCKWAVE LITHOTRIPSY;  Surgeon: Maninder Hadley MD;  Location:  [facility];  Service: Urology;  Laterality: Right;   • EXTRACORPOREAL SHOCKWAVE LITHOTRIPSY (ESWL), STENT INSERTION/REMOVAL     • HEMORRHOIDECTOMY     • KNEE SURGERY Left     arthroscopy       Social History     Socioeconomic History   • Marital status:    Tobacco Use   • Smoking status: Former Smoker     Types: Pipe     Quit date: 1973     Years since quittin.5   • Smokeless tobacco: Never Used   • Tobacco comment: only a pipe smoker and quit many years ago   Vaping Use   • Vaping Use: Never used   Substance and Sexual Activity   • Alcohol use: No   • Drug use: No   • Sexual activity: Defer       Family History   Problem Relation Age of Onset   • No Known Problems Father    • No Known Problems  "Mother        Objective    Temp 97.3 °F (36.3 °C)   Ht 188 cm (74\")   Wt 114 kg (252 lb)   BMI 32.35 kg/m²     Physical Exam        Results for orders placed or performed in visit on 07/20/22   POC Urinalysis Dipstick, Multipro    Specimen: Urine   Result Value Ref Range    Color Yellow Yellow, Straw, Dark Yellow, Adrianne    Clarity, UA Clear Clear    Glucose, UA Negative Negative mg/dL    Bilirubin Small (1+) (A) Negative    Ketones, UA Trace (A) Negative    Specific Gravity  1.030 1.005 - 1.030    Blood, UA Negative Negative    pH, Urine 6.0 5.0 - 8.0    Protein, POC Trace (A) Negative mg/dL    Urobilinogen, UA 1 E.U./dL  (A) Normal    Nitrite, UA Negative Negative    Leukocytes Negative Negative     Assessment and Plan    Diagnoses and all orders for this visit:    1. Calculus of kidney (Primary)  -     POC Urinalysis Dipstick, Multipro    2. Gross hematuria  -     CT Abdomen Pelvis With & Without Contrast; Future      New problem of history of gross hematuria for which I recommended follow-up with me for cystoscopy after getting a CT scan for complete hematuria evaluation and also to rule out retained ureteral stone/hydronephrosis.. Asymptomatic left sided kidney stones.    Continue tamsulosin.          This document has been signed by HORACE Hadley MD on July 21, 2022 15:47 CDT              "

## 2022-07-20 ENCOUNTER — OFFICE VISIT (OUTPATIENT)
Dept: UROLOGY | Facility: CLINIC | Age: 72
End: 2022-07-20

## 2022-07-20 ENCOUNTER — HOSPITAL ENCOUNTER (OUTPATIENT)
Dept: GENERAL RADIOLOGY | Facility: HOSPITAL | Age: 72
Discharge: HOME | End: 2022-07-20
Admitting: UROLOGY

## 2022-07-20 VITALS — HEIGHT: 74 IN | TEMPERATURE: 97.3 F | WEIGHT: 252 LBS | BODY MASS INDEX: 32.34 KG/M2

## 2022-07-20 DIAGNOSIS — N20.0 CALCULUS OF KIDNEY: ICD-10-CM

## 2022-07-20 DIAGNOSIS — N20.0 CALCULUS OF KIDNEY: Primary | ICD-10-CM

## 2022-07-20 DIAGNOSIS — R31.0 GROSS HEMATURIA: ICD-10-CM

## 2022-07-20 LAB
BILIRUB BLD-MCNC: ABNORMAL MG/DL
CLARITY, POC: CLEAR
COLOR UR: YELLOW
GLUCOSE UR STRIP-MCNC: NEGATIVE MG/DL
KETONES UR QL: ABNORMAL
LEUKOCYTE EST, POC: NEGATIVE
NITRITE UR-MCNC: NEGATIVE MG/ML
PH UR: 6 [PH] (ref 5–8)
PROT UR STRIP-MCNC: ABNORMAL MG/DL
RBC # UR STRIP: NEGATIVE /UL
SP GR UR: 1.03 (ref 1–1.03)
UROBILINOGEN UR QL: ABNORMAL

## 2022-07-20 PROCEDURE — 81001 URINALYSIS AUTO W/SCOPE: CPT | Performed by: UROLOGY

## 2022-07-20 PROCEDURE — 99214 OFFICE O/P EST MOD 30 MIN: CPT | Performed by: UROLOGY

## 2022-07-20 PROCEDURE — 74018 RADEX ABDOMEN 1 VIEW: CPT

## 2022-08-11 ENCOUNTER — HOSPITAL ENCOUNTER (OUTPATIENT)
Dept: CT IMAGING | Facility: HOSPITAL | Age: 72
Discharge: HOME | End: 2022-08-11
Admitting: UROLOGY

## 2022-08-11 DIAGNOSIS — R31.0 GROSS HEMATURIA: ICD-10-CM

## 2022-08-11 PROCEDURE — 25010000002 IOPAMIDOL 61 % SOLUTION: Performed by: UROLOGY

## 2022-08-11 PROCEDURE — 74178 CT ABD&PLV WO CNTR FLWD CNTR: CPT

## 2022-08-11 RX ADMIN — IOPAMIDOL 100 ML: 612 INJECTION, SOLUTION INTRAVENOUS at 09:25

## 2022-08-12 ENCOUNTER — PROCEDURE VISIT (OUTPATIENT)
Dept: UROLOGY | Facility: CLINIC | Age: 72
End: 2022-08-12

## 2022-08-12 DIAGNOSIS — R35.0 URINE FREQUENCY: ICD-10-CM

## 2022-08-12 DIAGNOSIS — N20.0 CALCULUS OF KIDNEY: ICD-10-CM

## 2022-08-12 DIAGNOSIS — R31.0 GROSS HEMATURIA: Primary | ICD-10-CM

## 2022-08-12 LAB
BILIRUB BLD-MCNC: ABNORMAL MG/DL
CLARITY, POC: CLEAR
COLOR UR: YELLOW
GLUCOSE UR STRIP-MCNC: NEGATIVE MG/DL
KETONES UR QL: ABNORMAL
LEUKOCYTE EST, POC: NEGATIVE
NITRITE UR-MCNC: NEGATIVE MG/ML
PH UR: 6 [PH] (ref 5–8)
PROT UR STRIP-MCNC: ABNORMAL MG/DL
RBC # UR STRIP: ABNORMAL /UL
SP GR UR: 1.02 (ref 1–1.03)
UROBILINOGEN UR QL: ABNORMAL

## 2022-08-12 PROCEDURE — 81001 URINALYSIS AUTO W/SCOPE: CPT | Performed by: UROLOGY

## 2022-08-12 PROCEDURE — 52000 CYSTOURETHROSCOPY: CPT | Performed by: UROLOGY

## 2022-08-12 NOTE — PROGRESS NOTES
Pre- operative diagnosis:  Hematuria.  CT urogram shows bilateral nonobstructing small kidney stones, no masses or hydronephrosis.    Post operative diagnosis:  BPH.  Kidney stones    Procedure:  The patient was prepped and draped in a normal sterile fashion.  The urethra was anesthetized with 2% lidocaine jelly.  A flexible cystoscope was introduced per urethra.      Urethra:  Normal    Bladder:  Normal mucosa, No bladder tumor and mild trabeculation    Ureteral orifices:  Normal position bilaterally and Clear efflux bilaterally    Prostate:  lateral lobe hypertrophy    Patient tolerated the procedure well    Complications: none    Blood loss: minimal    Follow up:    Routine follow up-with me in January with pre-clinic KUB and PSA.  continue tamsulosin.  Consider finasteride as needed in the future.      This document has been signed by HORACE Hadley MD on August 12, 2022 12:50 CDT

## 2023-01-12 NOTE — PROGRESS NOTES
Subjective    Mr. Durán is 72 y.o. male    Chief Complaint: Calculus of kidney     History of Present Illness    72-year-old male established patient follow-up for enlarged prostate and kidney stones.    He had normal hematuria evaluation last year except for enlarged prostate.  LUTS well-controlled on tamsulosin.  He had right ESWL 6/4/2021 for 9 mm right kidney stone.  KUB x-ray done today reviewed by me with the patient shows bilateral kidney stones, with some mild enlargement of his left-sided kidney stone now measuring up to 8 mm.  He also has a calcific density adjacent to the transverse process on the left concerning for possible small UPJ stone approximately 4 mm versus artifact.  He denies any flank pain and otherwise feels well.  Last PSA on 2/17/2022 was normal at 0.68.     I independently visualized and reviewed the patient's prior imaging studies today in clinic and discussed the imaging findings with the patient.    The following portions of the patient's history were reviewed and updated as appropriate: allergies, current medications, past family history, past medical history, past social history, past surgical history and problem list.    Review of Systems      Current Outpatient Medications:   •  allopurinol (ZYLOPRIM) 300 MG tablet, Take 300 mg by mouth Daily., Disp: , Rfl: 3  •  amLODIPine (NORVASC) 5 MG tablet, Take 5 mg by mouth Daily., Disp: , Rfl: 5  •  atenolol (TENORMIN) 50 MG tablet, Take 50 mg by mouth Daily., Disp: , Rfl: 3  •  HYDROcodone-acetaminophen (NORCO) 5-325 MG per tablet, Take 1 tablet by mouth Every 4 (Four) Hours As Needed (postop pain)., Disp: 18 tablet, Rfl: 0  •  meloxicam (MOBIC) 7.5 MG tablet, Take 7.5 mg by mouth Daily. Stopped prior to surgery on 6/1/17, Disp: , Rfl:   •  Multiple Vitamins-Minerals (MULTIVITAMIN ADULT PO), Take 1 capsule by mouth Daily., Disp: , Rfl:   •  ondansetron ODT (Zofran ODT) 4 MG disintegrating tablet, Place 1 tablet on the tongue Every 6  (Six) Hours As Needed for Nausea., Disp: 6 tablet, Rfl: 1  •  tamsulosin (FLOMAX) 0.4 MG capsule 24 hr capsule, Take 1 capsule by mouth Daily., Disp: 90 capsule, Rfl: 5    Past Medical History:   Diagnosis Date   • Arthritis     gout   • BPH with urinary obstruction    • Calculus of kidney    • Calculus of ureter    • Hearing loss    • Hypertension    • Kidney stones    • Microscopic hematuria    • Nocturia        Past Surgical History:   Procedure Laterality Date   • CARDIAC CATHETERIZATION     • CARPAL TUNNEL RELEASE     • COLONOSCOPY     • EXTRACORPOREAL SHOCK WAVE LITHOTRIPSY (ESWL) Left 3/1/2017    Procedure: LEFT EXTRACORPOREAL SHOCKWAVE LITHOTRIPSY;  Surgeon: Emanuel Franco MD;  Location: [facility] OR;  Service:    • EXTRACORPOREAL SHOCK WAVE LITHOTRIPSY (ESWL) Left 2017    Procedure: EXTRACORPOREAL SHOCKWAVE LITHOTRIPSY;  Surgeon: Emanuel Franco MD;  Location: [facility] OR;  Service:    • EXTRACORPOREAL SHOCK WAVE LITHOTRIPSY (ESWL) Left 10/28/2019    Procedure: LEFT EXTRACORPOREAL SHOCKWAVE LITHOTRIPSY;  Surgeon: Maninder Hadley MD;  Location: [facility] OR;  Service: Urology   • EXTRACORPOREAL SHOCK WAVE LITHOTRIPSY (ESWL) Right 2021    Procedure: RIGHT EXTRACORPOREAL SHOCKWAVE LITHOTRIPSY;  Surgeon: Maninder Hadley MD;  Location: [facility] OR;  Service: Urology;  Laterality: Right;   • EXTRACORPOREAL SHOCKWAVE LITHOTRIPSY (ESWL), STENT INSERTION/REMOVAL     • HEMORRHOIDECTOMY     • KNEE SURGERY Left     arthroscopy       Social History     Socioeconomic History   • Marital status:    Tobacco Use   • Smoking status: Former     Types: Pipe     Quit date: 1973     Years since quittin.0   • Smokeless tobacco: Never   • Tobacco comments:     only a pipe smoker and quit many years ago   Vaping Use   • Vaping Use: Never used   Substance and Sexual Activity   • Alcohol use: No   • Drug use: No   • Sexual activity: Defer       Family History   Problem Relation Age of Onset   • No  "Known Problems Father    • No Known Problems Mother        Objective    Temp 97.8 °F (36.6 °C)   Ht 188 cm (74\")   Wt 116 kg (255 lb 3.2 oz)   BMI 32.77 kg/m²     Physical Exam        Results for orders placed or performed in visit on 08/12/22   POC Urinalysis Dipstick, Multipro    Specimen: Urine   Result Value Ref Range    Color Yellow Yellow, Straw, Dark Yellow, Adrianne    Clarity, UA Clear Clear    Glucose, UA Negative Negative mg/dL    Bilirubin Small (1+) (A) Negative    Ketones, UA Trace (A) Negative    Specific Gravity  1.025 1.005 - 1.030    Blood, UA Trace (A) Negative    pH, Urine 6.0 5.0 - 8.0    Protein, POC 30 mg/dL (A) Negative mg/dL    Urobilinogen, UA 1 E.U./dL (A) Normal    Nitrite, UA Negative Negative    Leukocytes Negative Negative     Assessment and Plan    Diagnoses and all orders for this visit:    1. Calculus of kidney (Primary)  -     Cancel: POC Urinalysis Dipstick, Multipro  -     XR abdomen kub; Future    2. BPH with obstruction/lower urinary tract symptoms  -     tamsulosin (FLOMAX) 0.4 MG capsule 24 hr capsule; Take 1 capsule by mouth Daily.  Dispense: 90 capsule; Refill: 5      Overall stable appearing stones compared to the CT scan last year.  LUTS well-controlled on alpha-blocker monotherapy-continue tamsulosin.  We discussed that he may have a small left UPJ stone versus artifact.  He will follow-up with me in 6 to 8 weeks with repeat pre-clinic KUB or sooner as needed for any flank pain or concern for ureteral stone.      This document has been signed by HORACE Hadley MD on January 17, 2023 16:12 CST          "

## 2023-01-16 ENCOUNTER — OFFICE VISIT (OUTPATIENT)
Dept: UROLOGY | Facility: CLINIC | Age: 73
End: 2023-01-16
Payer: MEDICARE

## 2023-01-16 ENCOUNTER — HOSPITAL ENCOUNTER (OUTPATIENT)
Dept: GENERAL RADIOLOGY | Facility: HOSPITAL | Age: 73
Discharge: HOME | End: 2023-01-16
Admitting: UROLOGY
Payer: MEDICARE

## 2023-01-16 VITALS — WEIGHT: 255.2 LBS | HEIGHT: 74 IN | TEMPERATURE: 97.8 F | BODY MASS INDEX: 32.75 KG/M2

## 2023-01-16 DIAGNOSIS — N20.0 CALCULUS OF KIDNEY: Primary | ICD-10-CM

## 2023-01-16 DIAGNOSIS — N20.0 CALCULUS OF KIDNEY: ICD-10-CM

## 2023-01-16 DIAGNOSIS — N13.8 BPH WITH OBSTRUCTION/LOWER URINARY TRACT SYMPTOMS: ICD-10-CM

## 2023-01-16 DIAGNOSIS — N40.1 BPH WITH OBSTRUCTION/LOWER URINARY TRACT SYMPTOMS: ICD-10-CM

## 2023-01-16 PROCEDURE — 74018 RADEX ABDOMEN 1 VIEW: CPT

## 2023-01-16 PROCEDURE — 99214 OFFICE O/P EST MOD 30 MIN: CPT | Performed by: UROLOGY

## 2023-01-16 PROCEDURE — 81003 URINALYSIS AUTO W/O SCOPE: CPT | Performed by: UROLOGY

## 2023-01-16 RX ORDER — TAMSULOSIN HYDROCHLORIDE 0.4 MG/1
1 CAPSULE ORAL DAILY
Qty: 90 CAPSULE | Refills: 5 | Status: SHIPPED | OUTPATIENT
Start: 2023-01-16

## 2023-01-16 NOTE — LETTER
January 17, 2023     Tariq Russell MD  [address] [place] [address] [id number]  [place] [address]    Patient: Martin Durán   YOB: 1950   Date of Visit: 1/16/2023     Dear Dr. Drew MD:    Thank you for referring Martin Durán to me for evaluation. Below are the relevant portions of my assessment and plan of care.    If you have questions, please do not hesitate to call me. I look forward to following Martin along with you.         Sincerely,        Maninder Hadley MD        CC: No Recipients    Progress Notes:  Subjective    Mr. Durán is 72 y.o. male    Chief Complaint: Calculus of kidney     History of Present Illness    72-year-old male established patient follow-up for enlarged prostate and kidney stones.    He had normal hematuria evaluation last year except for enlarged prostate.  LUTS well-controlled on tamsulosin.  He had right ESWL 6/4/2021 for 9 mm right kidney stone.  KUB x-ray done today reviewed by me with the patient shows bilateral kidney stones, with some mild enlargement of his left-sided kidney stone now measuring up to 8 mm.  He also has a calcific density adjacent to the transverse process on the left concerning for possible small UPJ stone approximately 4 mm versus artifact.  He denies any flank pain and otherwise feels well.  Last PSA on 2/17/2022 was normal at 0.68.     I independently visualized and reviewed the patient's prior imaging studies today in clinic and discussed the imaging findings with the patient.    The following portions of the patient's history were reviewed and updated as appropriate: allergies, current medications, past family history, past medical history, past social history, past surgical history and problem list.    Review of Systems      Current Outpatient Medications:   •  allopurinol (ZYLOPRIM) 300 MG tablet, Take 300 mg by mouth Daily., Disp: , Rfl: 3  •  amLODIPine (NORVASC) 5 MG tablet, Take 5 mg by mouth Daily., Disp: , Rfl: 5  •  atenolol  (TENORMIN) 50 MG tablet, Take 50 mg by mouth Daily., Disp: , Rfl: 3  •  HYDROcodone-acetaminophen (NORCO) 5-325 MG per tablet, Take 1 tablet by mouth Every 4 (Four) Hours As Needed (postop pain)., Disp: 18 tablet, Rfl: 0  •  meloxicam (MOBIC) 7.5 MG tablet, Take 7.5 mg by mouth Daily. Stopped prior to surgery on 6/1/17, Disp: , Rfl:   •  Multiple Vitamins-Minerals (MULTIVITAMIN ADULT PO), Take 1 capsule by mouth Daily., Disp: , Rfl:   •  ondansetron ODT (Zofran ODT) 4 MG disintegrating tablet, Place 1 tablet on the tongue Every 6 (Six) Hours As Needed for Nausea., Disp: 6 tablet, Rfl: 1  •  tamsulosin (FLOMAX) 0.4 MG capsule 24 hr capsule, Take 1 capsule by mouth Daily., Disp: 90 capsule, Rfl: 5    Past Medical History:   Diagnosis Date   • Arthritis     gout   • BPH with urinary obstruction    • Calculus of kidney    • Calculus of ureter    • Hearing loss    • Hypertension    • Kidney stones    • Microscopic hematuria    • Nocturia        Past Surgical History:   Procedure Laterality Date   • CARDIAC CATHETERIZATION     • CARPAL TUNNEL RELEASE     • COLONOSCOPY     • EXTRACORPOREAL SHOCK WAVE LITHOTRIPSY (ESWL) Left 3/1/2017    Procedure: LEFT EXTRACORPOREAL SHOCKWAVE LITHOTRIPSY;  Surgeon: Emanuel Franco MD;  Location: [facility] OR;  Service:    • EXTRACORPOREAL SHOCK WAVE LITHOTRIPSY (ESWL) Left 6/14/2017    Procedure: EXTRACORPOREAL SHOCKWAVE LITHOTRIPSY;  Surgeon: Emanuel Franco MD;  Location: [facility] OR;  Service:    • EXTRACORPOREAL SHOCK WAVE LITHOTRIPSY (ESWL) Left 10/28/2019    Procedure: LEFT EXTRACORPOREAL SHOCKWAVE LITHOTRIPSY;  Surgeon: Maninder Hadlye MD;  Location: [facility] OR;  Service: Urology   • EXTRACORPOREAL SHOCK WAVE LITHOTRIPSY (ESWL) Right 6/4/2021    Procedure: RIGHT EXTRACORPOREAL SHOCKWAVE LITHOTRIPSY;  Surgeon: Maninder Hadley MD;  Location: [facility] OR;  Service: Urology;  Laterality: Right;   • EXTRACORPOREAL SHOCKWAVE LITHOTRIPSY (ESWL), STENT INSERTION/REMOVAL     •  "HEMORRHOIDECTOMY     • KNEE SURGERY Left     arthroscopy       Social History     Socioeconomic History   • Marital status:    Tobacco Use   • Smoking status: Former     Types: Pipe     Quit date: 1973     Years since quittin.0   • Smokeless tobacco: Never   • Tobacco comments:     only a pipe smoker and quit many years ago   Vaping Use   • Vaping Use: Never used   Substance and Sexual Activity   • Alcohol use: No   • Drug use: No   • Sexual activity: Defer       Family History   Problem Relation Age of Onset   • No Known Problems Father    • No Known Problems Mother        Objective    Temp 97.8 °F (36.6 °C)   Ht 188 cm (74\")   Wt 116 kg (255 lb 3.2 oz)   BMI 32.77 kg/m²     Physical Exam        Results for orders placed or performed in visit on 22   POC Urinalysis Dipstick, Multipro    Specimen: Urine   Result Value Ref Range    Color Yellow Yellow, Straw, Dark Yellow, Adrianne    Clarity, UA Clear Clear    Glucose, UA Negative Negative mg/dL    Bilirubin Small (1+) (A) Negative    Ketones, UA Trace (A) Negative    Specific Gravity  1.025 1.005 - 1.030    Blood, UA Trace (A) Negative    pH, Urine 6.0 5.0 - 8.0    Protein, POC 30 mg/dL (A) Negative mg/dL    Urobilinogen, UA 1 E.U./dL (A) Normal    Nitrite, UA Negative Negative    Leukocytes Negative Negative     Assessment and Plan    Diagnoses and all orders for this visit:    1. Calculus of kidney (Primary)  -     Cancel: POC Urinalysis Dipstick, Multipro  -     XR abdomen kub; Future    2. BPH with obstruction/lower urinary tract symptoms  -     tamsulosin (FLOMAX) 0.4 MG capsule 24 hr capsule; Take 1 capsule by mouth Daily.  Dispense: 90 capsule; Refill: 5      Overall stable appearing stones compared to the CT scan last year.  LUTS well-controlled on alpha-blocker monotherapy-continue tamsulosin.  We discussed that he may have a small left UPJ stone versus artifact.  He will follow-up with me in 6 to 8 weeks with repeat pre-clinic KUB " or sooner as needed for any flank pain or concern for ureteral stone.      This document has been signed by HORACE Hadley MD on January 17, 2023 16:12 CST

## 2023-03-17 NOTE — PROGRESS NOTES
Subjective    Mr. Durán is 73 y.o. male    Chief Complaint: Calculus of kidney     History of Present Illness    73-year-old male established patient follow-up for enlarged prostate and kidney stones.    He had normal hematuria evaluation in 2022 except for enlarged prostate.  LUTS well-controlled on tamsulosin.  He was noted to have a small radiodensity in the vicinity of possible left proximal ureter/UPJ on KUB in January and follows up for repeat KUB today.  His KUB x-ray done today reviewed by me with the patient shows stable calcifications.  He continues to have absolutely no flank pain or hematuria feels well.  He most recently had right ESWL 6/4/2021 for 9 mm right kidney stone.  KUB x-ray done today reviewed by me with the patient shows bilateral kidney stones, with some mild enlargement of his left-sided kidney stone now measuring up to 8 mm.  He also has a calcific density adjacent to the transverse process on the left concerning for possible small UPJ stone approximately 4 mm versus artifact.  He denies any flank pain and otherwise feels well.  Last PSA on 2/17/2022 was normal at 0.68.     I independently visualized and reviewed the patient's prior imaging studies today in clinic and discussed the imaging findings with the patient.       The following portions of the patient's history were reviewed and updated as appropriate: allergies, current medications, past family history, past medical history, past social history, past surgical history and problem list.    Review of Systems      Current Outpatient Medications:   •  allopurinol (ZYLOPRIM) 300 MG tablet, Take 1 tablet by mouth Daily., Disp: , Rfl: 3  •  amLODIPine (NORVASC) 5 MG tablet, Take 1 tablet by mouth Daily., Disp: , Rfl: 5  •  atenolol (TENORMIN) 50 MG tablet, Take 1 tablet by mouth Daily., Disp: , Rfl: 3  •  meloxicam (MOBIC) 7.5 MG tablet, Take 1 tablet by mouth Daily. Stopped prior to surgery on 6/1/17, Disp: , Rfl:   •  Multiple  Vitamins-Minerals (MULTIVITAMIN ADULT PO), Take 1 tablet by mouth Daily., Disp: , Rfl:   •  tamsulosin (FLOMAX) 0.4 MG capsule 24 hr capsule, Take 1 capsule by mouth Daily., Disp: 90 capsule, Rfl: 5  •  HYDROcodone-acetaminophen (NORCO) 5-325 MG per tablet, Take 1 tablet by mouth Every 4 (Four) Hours As Needed (postop pain). (Patient not taking: Reported on 3/22/2023), Disp: 18 tablet, Rfl: 0  •  ondansetron ODT (Zofran ODT) 4 MG disintegrating tablet, Place 1 tablet on the tongue Every 6 (Six) Hours As Needed for Nausea. (Patient not taking: Reported on 3/22/2023), Disp: 6 tablet, Rfl: 1    Past Medical History:   Diagnosis Date   • Arthritis     gout   • BPH with urinary obstruction    • Calculus of kidney    • Calculus of ureter    • Hearing loss    • Hypertension    • Kidney stones    • Microscopic hematuria    • Nocturia        Past Surgical History:   Procedure Laterality Date   • CARDIAC CATHETERIZATION     • CARPAL TUNNEL RELEASE     • COLONOSCOPY     • EXTRACORPOREAL SHOCK WAVE LITHOTRIPSY (ESWL) Left 3/1/2017    Procedure: LEFT EXTRACORPOREAL SHOCKWAVE LITHOTRIPSY;  Surgeon: Emanuel Franco MD;  Location: [facility] OR;  Service:    • EXTRACORPOREAL SHOCK WAVE LITHOTRIPSY (ESWL) Left 6/14/2017    Procedure: EXTRACORPOREAL SHOCKWAVE LITHOTRIPSY;  Surgeon: Emanuel Franco MD;  Location: [facility] OR;  Service:    • EXTRACORPOREAL SHOCK WAVE LITHOTRIPSY (ESWL) Left 10/28/2019    Procedure: LEFT EXTRACORPOREAL SHOCKWAVE LITHOTRIPSY;  Surgeon: Maninder Hadley MD;  Location: [facility] OR;  Service: Urology   • EXTRACORPOREAL SHOCK WAVE LITHOTRIPSY (ESWL) Right 6/4/2021    Procedure: RIGHT EXTRACORPOREAL SHOCKWAVE LITHOTRIPSY;  Surgeon: Maninder Hadley MD;  Location: [facility] OR;  Service: Urology;  Laterality: Right;   • EXTRACORPOREAL SHOCKWAVE LITHOTRIPSY (ESWL), STENT INSERTION/REMOVAL     • HEMORRHOIDECTOMY     • KNEE SURGERY Left 1997    arthroscopy       Social History     Socioeconomic History   • Marital  "status:    Tobacco Use   • Smoking status: Former     Types: Pipe     Quit date: 1973     Years since quittin.2   • Smokeless tobacco: Never   • Tobacco comments:     only a pipe smoker and quit many years ago   Vaping Use   • Vaping Use: Never used   Substance and Sexual Activity   • Alcohol use: No   • Drug use: No   • Sexual activity: Defer       Family History   Problem Relation Age of Onset   • No Known Problems Father    • No Known Problems Mother        Objective    Temp 98 °F (36.7 °C)   Ht 188 cm (74\")   Wt 114 kg (252 lb)   BMI 32.35 kg/m²     Physical Exam        Results for orders placed or performed in visit on 22   POC Urinalysis Dipstick, Multipro    Specimen: Urine   Result Value Ref Range    Color Yellow Yellow, Straw, Dark Yellow, Adrianne    Clarity, UA Clear Clear    Glucose, UA Negative Negative mg/dL    Bilirubin Small (1+) (A) Negative    Ketones, UA Trace (A) Negative    Specific Gravity  1.025 1.005 - 1.030    Blood, UA Trace (A) Negative    pH, Urine 6.0 5.0 - 8.0    Protein, POC 30 mg/dL (A) Negative mg/dL    Urobilinogen, UA 1 E.U./dL (A) Normal    Nitrite, UA Negative Negative    Leukocytes Negative Negative     Assessment and Plan    Diagnoses and all orders for this visit:    1. Calculus of kidney (Primary)  -     Cancel: POC Urinalysis Dipstick, Multipro  -     US Renal Bilateral; Future    2. BPH with obstruction/lower urinary tract symptoms    3. Erectile disorder due to medical condition in male      Overall stable appearing stones compared to KUB last visit and the CT scan last year.  LUTS well-controlled on alpha-blocker monotherapy-continue tamsulosin.  We discussed that he may have a small left UPJ stone versus artifact.    He does not want another CT scan at this time.  He will follow-up with me in 6 months with pre-clinic bilateral renal ultrasound or sooner as needed.  He is having some mild ED but does not want a PDE inhibitor at this time.  We will " discuss further at next visit      This document has been signed by HORACE Hadley MD on March 24, 2023 08:48 CDT

## 2023-03-22 ENCOUNTER — OFFICE VISIT (OUTPATIENT)
Dept: UROLOGY | Facility: CLINIC | Age: 73
End: 2023-03-22
Payer: MEDICARE

## 2023-03-22 ENCOUNTER — HOSPITAL ENCOUNTER (OUTPATIENT)
Dept: GENERAL RADIOLOGY | Facility: HOSPITAL | Age: 73
Discharge: HOME | End: 2023-03-22
Admitting: UROLOGY
Payer: MEDICARE

## 2023-03-22 VITALS — WEIGHT: 252 LBS | TEMPERATURE: 98 F | BODY MASS INDEX: 32.34 KG/M2 | HEIGHT: 74 IN

## 2023-03-22 DIAGNOSIS — N13.8 BPH WITH OBSTRUCTION/LOWER URINARY TRACT SYMPTOMS: ICD-10-CM

## 2023-03-22 DIAGNOSIS — N52.1 ERECTILE DISORDER DUE TO MEDICAL CONDITION IN MALE: ICD-10-CM

## 2023-03-22 DIAGNOSIS — N20.0 CALCULUS OF KIDNEY: ICD-10-CM

## 2023-03-22 DIAGNOSIS — N40.1 BPH WITH OBSTRUCTION/LOWER URINARY TRACT SYMPTOMS: ICD-10-CM

## 2023-03-22 DIAGNOSIS — N20.0 CALCULUS OF KIDNEY: Primary | ICD-10-CM

## 2023-03-22 PROCEDURE — 74018 RADEX ABDOMEN 1 VIEW: CPT

## 2023-03-22 PROCEDURE — 1159F MED LIST DOCD IN RCRD: CPT | Performed by: UROLOGY

## 2023-03-22 PROCEDURE — 1160F RVW MEDS BY RX/DR IN RCRD: CPT | Performed by: UROLOGY

## 2023-03-22 PROCEDURE — 99214 OFFICE O/P EST MOD 30 MIN: CPT | Performed by: UROLOGY

## 2023-05-27 NOTE — NURSING NOTE
Patient states that he took his norvasc and atenolol this am  
Statement Selected
Attending Discharge Physical Examination:

## 2023-09-14 NOTE — PROGRESS NOTES
Subjective    Mr. Durán is 73 y.o. male    Chief Complaint: Calculus of kidney    History of Present Illness    73-year-old male established patient follow-up for enlarged prostate and kidney stones.    He had normal hematuria evaluation in 2022 except for enlarged prostate.  LUTS well-controlled on tamsulosin.  Bilateral renal ultrasound done today reviewed by me with the patient shows normal kidneys bilaterally with small echogenic foci consistent with his nonobstructing kidney stones, simple appearing renal cysts.  No hydronephrosis.  He most recently had right ESWL 6/4/2021 for 9 mm right kidney stone.   He denies any flank pain and otherwise feels well.  Last PSA on 2/17/2022 was normal at 0.68.     I independently visualized and reviewed the patient's prior imaging studies today in clinic and discussed the imaging findings with the patient.        The following portions of the patient's history were reviewed and updated as appropriate: allergies, current medications, past family history, past medical history, past social history, past surgical history and problem list.    Review of Systems      Current Outpatient Medications:     allopurinol (ZYLOPRIM) 300 MG tablet, Take 1 tablet by mouth Daily., Disp: , Rfl: 3    amLODIPine (NORVASC) 5 MG tablet, Take 1 tablet by mouth Daily., Disp: , Rfl: 5    atenolol (TENORMIN) 50 MG tablet, Take 1 tablet by mouth Daily., Disp: , Rfl: 3    HYDROcodone-acetaminophen (NORCO) 5-325 MG per tablet, Take 1 tablet by mouth Every 4 (Four) Hours As Needed (postop pain). (Patient not taking: Reported on 3/22/2023), Disp: 18 tablet, Rfl: 0    meloxicam (MOBIC) 7.5 MG tablet, Take 1 tablet by mouth Daily. Stopped prior to surgery on 6/1/17, Disp: , Rfl:     Multiple Vitamins-Minerals (MULTIVITAMIN ADULT PO), Take 1 tablet by mouth Daily., Disp: , Rfl:     ondansetron ODT (Zofran ODT) 4 MG disintegrating tablet, Place 1 tablet on the tongue Every 6 (Six) Hours As Needed for Nausea.  (Patient not taking: Reported on 3/22/2023), Disp: 6 tablet, Rfl: 1    tamsulosin (FLOMAX) 0.4 MG capsule 24 hr capsule, Take 1 capsule by mouth Daily., Disp: 90 capsule, Rfl: 5    Past Medical History:   Diagnosis Date    Arthritis     gout    BPH with urinary obstruction     Calculus of kidney     Calculus of ureter     Hearing loss     Hypertension     Kidney stones     Microscopic hematuria     Nocturia        Past Surgical History:   Procedure Laterality Date    CARDIAC CATHETERIZATION      CARPAL TUNNEL RELEASE      COLONOSCOPY      EXTRACORPOREAL SHOCK WAVE LITHOTRIPSY (ESWL) Left 3/1/2017    Procedure: LEFT EXTRACORPOREAL SHOCKWAVE LITHOTRIPSY;  Surgeon: Emanuel Franco MD;  Location:  [facility] OR;  Service:     EXTRACORPOREAL SHOCK WAVE LITHOTRIPSY (ESWL) Left 2017    Procedure: EXTRACORPOREAL SHOCKWAVE LITHOTRIPSY;  Surgeon: Emanuel Franco MD;  Location:  [facility] OR;  Service:     EXTRACORPOREAL SHOCK WAVE LITHOTRIPSY (ESWL) Left 10/28/2019    Procedure: LEFT EXTRACORPOREAL SHOCKWAVE LITHOTRIPSY;  Surgeon: Maninder Hadley MD;  Location:  [facility] OR;  Service: Urology    EXTRACORPOREAL SHOCK WAVE LITHOTRIPSY (ESWL) Right 2021    Procedure: RIGHT EXTRACORPOREAL SHOCKWAVE LITHOTRIPSY;  Surgeon: Maninder Hadley MD;  Location:  [facility] OR;  Service: Urology;  Laterality: Right;    EXTRACORPOREAL SHOCKWAVE LITHOTRIPSY (ESWL), STENT INSERTION/REMOVAL      HEMORRHOIDECTOMY      KNEE SURGERY Left     arthroscopy       Social History     Socioeconomic History    Marital status:    Tobacco Use    Smoking status: Former     Types: Pipe     Quit date: 1973     Years since quittin.7    Smokeless tobacco: Never    Tobacco comments:     only a pipe smoker and quit many years ago   Vaping Use    Vaping Use: Never used   Substance and Sexual Activity    Alcohol use: No    Drug use: No    Sexual activity: Defer       Family History   Problem Relation Age of Onset    No Known Problems Father      No Known Problems Mother        Objective    There were no vitals taken for this visit.    Physical Exam        Results for orders placed or performed in visit on 08/12/22   POC Urinalysis Dipstick, Multipro    Specimen: Urine   Result Value Ref Range    Color Yellow Yellow, Straw, Dark Yellow, Adrianne    Clarity, UA Clear Clear    Glucose, UA Negative Negative mg/dL    Bilirubin Small (1+) (A) Negative    Ketones, UA Trace (A) Negative    Specific Gravity  1.025 1.005 - 1.030    Blood, UA Trace (A) Negative    pH, Urine 6.0 5.0 - 8.0    Protein, POC 30 mg/dL (A) Negative mg/dL    Urobilinogen, UA 1 E.U./dL (A) Normal    Nitrite, UA Negative Negative    Leukocytes Negative Negative     Assessment and Plan    Diagnoses and all orders for this visit:    1. Calculus of kidney (Primary)  -     POC Urinalysis Dipstick, Multipro      Overall stable appearing stones compared to KUB last visit and the CT scan last year.  LUTS well-controlled on alpha-blocker monotherapy-continue tamsulosin.  Follow-up in 1 year with KUB or sooner as needed.      This document has been signed by HORACE Hadley MD on September 20, 2023 21:58 CDT

## 2023-09-20 ENCOUNTER — OFFICE VISIT (OUTPATIENT)
Dept: UROLOGY | Facility: CLINIC | Age: 73
End: 2023-09-20
Payer: MEDICARE

## 2023-09-20 ENCOUNTER — HOSPITAL ENCOUNTER (OUTPATIENT)
Dept: ULTRASOUND IMAGING | Facility: HOSPITAL | Age: 73
Discharge: HOME | End: 2023-09-20
Admitting: UROLOGY
Payer: MEDICARE

## 2023-09-20 VITALS — TEMPERATURE: 96.6 F | HEIGHT: 74 IN | WEIGHT: 245.4 LBS | BODY MASS INDEX: 31.49 KG/M2

## 2023-09-20 DIAGNOSIS — N20.0 CALCULUS OF KIDNEY: ICD-10-CM

## 2023-09-20 DIAGNOSIS — N40.1 BPH WITH OBSTRUCTION/LOWER URINARY TRACT SYMPTOMS: ICD-10-CM

## 2023-09-20 DIAGNOSIS — N13.8 BPH WITH OBSTRUCTION/LOWER URINARY TRACT SYMPTOMS: ICD-10-CM

## 2023-09-20 DIAGNOSIS — N20.0 CALCULUS OF KIDNEY: Primary | ICD-10-CM

## 2023-09-20 PROCEDURE — 76775 US EXAM ABDO BACK WALL LIM: CPT

## 2023-09-20 RX ORDER — TAMSULOSIN HYDROCHLORIDE 0.4 MG/1
1 CAPSULE ORAL DAILY
Qty: 90 CAPSULE | Refills: 5 | Status: SHIPPED | OUTPATIENT
Start: 2023-09-20

## 2024-01-23 LAB
ALBUMIN SERPL-MCNC: 3.9 G/DL (ref 3.5–5.2)
ALP SERPL-CCNC: 118 U/L (ref 40–130)
ALT SERPL-CCNC: 23 U/L (ref 5–41)
ANION GAP SERPL CALCULATED.3IONS-SCNC: 12 MMOL/L (ref 7–19)
AST SERPL-CCNC: 29 U/L (ref 5–40)
BASOPHILS # BLD: 0 K/UL (ref 0–0.2)
BASOPHILS NFR BLD: 0.6 % (ref 0–1)
BILIRUB SERPL-MCNC: 0.9 MG/DL (ref 0.2–1.2)
BUN SERPL-MCNC: 21 MG/DL (ref 8–23)
CALCIUM SERPL-MCNC: 9.4 MG/DL (ref 8.8–10.2)
CHLORIDE SERPL-SCNC: 105 MMOL/L (ref 98–111)
CHOLEST SERPL-MCNC: 108 MG/DL (ref 160–199)
CO2 SERPL-SCNC: 25 MMOL/L (ref 22–29)
CREAT SERPL-MCNC: 1 MG/DL (ref 0.5–1.2)
EOSINOPHIL # BLD: 0.1 K/UL (ref 0–0.6)
EOSINOPHIL NFR BLD: 3.8 % (ref 0–5)
ERYTHROCYTE [DISTWIDTH] IN BLOOD BY AUTOMATED COUNT: 13.6 % (ref 11.5–14.5)
GLUCOSE SERPL-MCNC: 94 MG/DL (ref 74–109)
HBA1C MFR BLD: 5.5 % (ref 4–6)
HCT VFR BLD AUTO: 38.1 % (ref 42–52)
HDLC SERPL-MCNC: 36 MG/DL (ref 55–121)
HGB BLD-MCNC: 12.3 G/DL (ref 14–18)
IMM GRANULOCYTES # BLD: 0 K/UL
LDLC SERPL CALC-MCNC: 62 MG/DL
LYMPHOCYTES # BLD: 1.2 K/UL (ref 1.1–4.5)
LYMPHOCYTES NFR BLD: 34.7 % (ref 20–40)
MCH RBC QN AUTO: 31.1 PG (ref 27–31)
MCHC RBC AUTO-ENTMCNC: 32.3 G/DL (ref 33–37)
MCV RBC AUTO: 96.5 FL (ref 80–94)
MONOCYTES # BLD: 0.4 K/UL (ref 0–0.9)
MONOCYTES NFR BLD: 10.3 % (ref 0–10)
NEUTROPHILS # BLD: 1.7 K/UL (ref 1.5–7.5)
NEUTS SEG NFR BLD: 50.3 % (ref 50–65)
PLATELET # BLD AUTO: 129 K/UL (ref 130–400)
PMV BLD AUTO: 10.6 FL (ref 9.4–12.4)
POTASSIUM SERPL-SCNC: 4 MMOL/L (ref 3.5–5)
PROT SERPL-MCNC: 7.3 G/DL (ref 6.6–8.7)
PSA SERPL-MCNC: 0.83 NG/ML (ref 0–4)
RBC # BLD AUTO: 3.95 M/UL (ref 4.7–6.1)
SODIUM SERPL-SCNC: 142 MMOL/L (ref 136–145)
TRIGL SERPL-MCNC: 51 MG/DL (ref 0–149)
TSH SERPL DL<=0.005 MIU/L-ACNC: 1.45 UIU/ML (ref 0.27–4.2)
WBC # BLD AUTO: 3.4 K/UL (ref 4.8–10.8)

## 2024-09-24 NOTE — PROGRESS NOTES
Subjective    Mr. Durán is 74 y.o. male    Chief Complaint: Calculus of kidney     History of Present Illness    74-year-old male established patient follow-up for enlarged prostate and kidney stones.  He had normal hematuria evaluation in 2022 except for enlarged prostate.  LUTS well-controlled on tamsulosin.  KUB done today reviewed by me with the patient shows tiny nonobstructing right kidney stone, somewhat enlarged left lower pole nonobstructing kidney stones measuring up to 1 cm each.    He most recently had right ESWL 6/4/2021 for 9 mm right kidney stone.   He denies any flank pain and otherwise feels well.  Last PSA on 2/17/2022 was normal at 0.68.     I independently visualized and reviewed the patient's prior imaging studies today in clinic and discussed the imaging findings with the patient.    The following portions of the patient's history were reviewed and updated as appropriate: allergies, current medications, past family history, past medical history, past social history, past surgical history and problem list.    Review of Systems      Current Outpatient Medications:     allopurinol (ZYLOPRIM) 300 MG tablet, Take 1 tablet by mouth Daily., Disp: , Rfl: 3    amLODIPine (NORVASC) 5 MG tablet, Take 1 tablet by mouth Daily., Disp: , Rfl: 5    atenolol (TENORMIN) 50 MG tablet, Take 1 tablet by mouth Daily., Disp: , Rfl: 3    meloxicam (MOBIC) 7.5 MG tablet, Take 1 tablet by mouth Daily. Stopped prior to surgery on 6/1/17, Disp: , Rfl:     Multiple Vitamins-Minerals (MULTIVITAMIN ADULT PO), Take 1 tablet by mouth Daily., Disp: , Rfl:     tamsulosin (FLOMAX) 0.4 MG capsule 24 hr capsule, Take 1 capsule by mouth Daily., Disp: 90 capsule, Rfl: 5    Past Medical History:   Diagnosis Date    Arthritis     gout    BPH with urinary obstruction     Calculus of kidney     Calculus of ureter     Hearing loss     Hypertension     Kidney stones     Microscopic hematuria     Nocturia        Past Surgical History:    Procedure Laterality Date    CARDIAC CATHETERIZATION      CARPAL TUNNEL RELEASE      COLONOSCOPY      EXTRACORPOREAL SHOCK WAVE LITHOTRIPSY (ESWL) Left 3/1/2017    Procedure: LEFT EXTRACORPOREAL SHOCKWAVE LITHOTRIPSY;  Surgeon: Emanuel Franco MD;  Location:  [facility] OR;  Service:     EXTRACORPOREAL SHOCK WAVE LITHOTRIPSY (ESWL) Left 2017    Procedure: EXTRACORPOREAL SHOCKWAVE LITHOTRIPSY;  Surgeon: Emanuel Franco MD;  Location:  [facility] OR;  Service:     EXTRACORPOREAL SHOCK WAVE LITHOTRIPSY (ESWL) Left 10/28/2019    Procedure: LEFT EXTRACORPOREAL SHOCKWAVE LITHOTRIPSY;  Surgeon: Maninder Hadley MD;  Location:  [facility] OR;  Service: Urology    EXTRACORPOREAL SHOCK WAVE LITHOTRIPSY (ESWL) Right 2021    Procedure: RIGHT EXTRACORPOREAL SHOCKWAVE LITHOTRIPSY;  Surgeon: Maninder Hadley MD;  Location:  [facility] OR;  Service: Urology;  Laterality: Right;    EXTRACORPOREAL SHOCKWAVE LITHOTRIPSY (ESWL), STENT INSERTION/REMOVAL      HEMORRHOIDECTOMY      KNEE SURGERY Left     arthroscopy       Social History     Socioeconomic History    Marital status:    Tobacco Use    Smoking status: Former     Types: Pipe     Quit date: 1973     Years since quittin.7    Smokeless tobacco: Never    Tobacco comments:     only a pipe smoker and quit many years ago   Vaping Use    Vaping status: Never Used   Substance and Sexual Activity    Alcohol use: No    Drug use: No    Sexual activity: Defer       Family History   Problem Relation Age of Onset    No Known Problems Father     No Known Problems Mother        Objective    There were no vitals taken for this visit.    Physical Exam        Results for orders placed or performed in visit on 23   POC Urinalysis Dipstick, Multipro    Specimen: Urine   Result Value Ref Range    Color Yellow Yellow, Straw, Dark Yellow, Adrianne    Clarity, UA Clear Clear    Glucose, UA Negative Negative mg/dL    Bilirubin Small (1+) (A) Negative    Ketones, UA Trace (A)  Negative    Specific Gravity  1.025 1.005 - 1.030    Blood, UA Large (A) Negative    pH, Urine 6.0 5.0 - 8.0    Protein, POC 30 mg/dL (A) Negative mg/dL    Urobilinogen, UA 2.0 E.U./dL (A) Normal, 0.2 E.U./dL    Nitrite, UA Negative Negative    Leukocytes Negative Negative     Assessment and Plan    Diagnoses and all orders for this visit:    1. Calculus of kidney (Primary)  -     POC Urinalysis Dipstick, Multipro  -     XR Abdomen KUB    2. BPH with obstruction/lower urinary tract symptoms  -     tamsulosin (FLOMAX) 0.4 MG capsule 24 hr capsule; Take 1 capsule by mouth Every Night.  Dispense: 90 capsule; Refill: 5      Slightly enlarged asymptomatic left lower pole kidney stones which he would like to observe for now.  LUTS well-controlled on alpha-blocker monotherapy-continue tamsulosin.  Follow-up in 1 year with KUB or sooner as needed.         This document has been signed by HORACE Hadley MD on October 2, 2024 18:03 CDT

## 2024-10-01 ENCOUNTER — TELEPHONE (OUTPATIENT)
Dept: UROLOGY | Facility: CLINIC | Age: 74
End: 2024-10-01
Payer: MEDICARE

## 2024-10-02 ENCOUNTER — HOSPITAL ENCOUNTER (OUTPATIENT)
Dept: GENERAL RADIOLOGY | Facility: HOSPITAL | Age: 74
Discharge: HOME | End: 2024-10-02
Admitting: UROLOGY
Payer: MEDICARE

## 2024-10-02 ENCOUNTER — OFFICE VISIT (OUTPATIENT)
Dept: UROLOGY | Facility: CLINIC | Age: 74
End: 2024-10-02
Payer: MEDICARE

## 2024-10-02 VITALS — TEMPERATURE: 97.2 F | HEIGHT: 74 IN | BODY MASS INDEX: 29.75 KG/M2 | WEIGHT: 231.8 LBS

## 2024-10-02 DIAGNOSIS — N40.1 BPH WITH OBSTRUCTION/LOWER URINARY TRACT SYMPTOMS: ICD-10-CM

## 2024-10-02 DIAGNOSIS — N13.8 BPH WITH OBSTRUCTION/LOWER URINARY TRACT SYMPTOMS: ICD-10-CM

## 2024-10-02 DIAGNOSIS — N20.0 CALCULUS OF KIDNEY: Primary | ICD-10-CM

## 2024-10-02 LAB
BILIRUB BLD-MCNC: ABNORMAL MG/DL
CLARITY, POC: CLEAR
COLOR UR: ABNORMAL
GLUCOSE UR STRIP-MCNC: NEGATIVE MG/DL
KETONES UR QL: NEGATIVE
LEUKOCYTE EST, POC: NEGATIVE
NITRITE UR-MCNC: NEGATIVE MG/ML
PH UR: 5.5 [PH] (ref 5–8)
PROT UR STRIP-MCNC: ABNORMAL MG/DL
RBC # UR STRIP: ABNORMAL /UL
SP GR UR: 1.02 (ref 1–1.03)
UROBILINOGEN UR QL: ABNORMAL

## 2024-10-02 PROCEDURE — 74018 RADEX ABDOMEN 1 VIEW: CPT

## 2024-10-02 PROCEDURE — 1159F MED LIST DOCD IN RCRD: CPT | Performed by: UROLOGY

## 2024-10-02 PROCEDURE — 1160F RVW MEDS BY RX/DR IN RCRD: CPT | Performed by: UROLOGY

## 2024-10-02 PROCEDURE — 81001 URINALYSIS AUTO W/SCOPE: CPT | Performed by: UROLOGY

## 2024-10-02 PROCEDURE — 99214 OFFICE O/P EST MOD 30 MIN: CPT | Performed by: UROLOGY

## 2024-10-02 RX ORDER — TAMSULOSIN HYDROCHLORIDE 0.4 MG/1
1 CAPSULE ORAL NIGHTLY
Qty: 90 CAPSULE | Refills: 5 | Status: SHIPPED | OUTPATIENT
Start: 2024-10-02

## 2025-02-25 ENCOUNTER — OFFICE VISIT (OUTPATIENT)
Dept: GASTROENTEROLOGY | Facility: CLINIC | Age: 75
End: 2025-02-25
Payer: MEDICARE

## 2025-02-25 VITALS
HEIGHT: 74 IN | TEMPERATURE: 97.7 F | SYSTOLIC BLOOD PRESSURE: 120 MMHG | OXYGEN SATURATION: 98 % | HEART RATE: 76 BPM | DIASTOLIC BLOOD PRESSURE: 60 MMHG | BODY MASS INDEX: 28.64 KG/M2 | WEIGHT: 223.2 LBS

## 2025-02-25 DIAGNOSIS — Z86.0101 HISTORY OF ADENOMATOUS POLYP OF COLON: Primary | ICD-10-CM

## 2025-02-25 RX ORDER — LOSARTAN POTASSIUM 100 MG/1
TABLET ORAL
COMMUNITY
Start: 2025-02-04

## 2025-02-25 RX ORDER — HYDROCHLOROTHIAZIDE 12.5 MG/1
TABLET ORAL
COMMUNITY
Start: 2025-02-18

## 2025-02-25 NOTE — PROGRESS NOTES
[facility]    Primary Physician Horacio Keating MD    2/25/2025    Martin Durán   1950      Chief Complaint   Patient presents with    GI Problem     Colonoscopy screening        Subjective     HPI    Martin Durán is a 74 y.o. male who presents as a referral for preventative maintenance. He has no complaints of nausea or vomiting. No change in bowels. No wt loss. No BRBPR. No melena. No abdominal pain.         Colonoscopy, Scan (08/01/2016) by Dr. Keshawn Hoyt, rectal polyp ( path tubular adenomatous).  Recall 5 years.   SCANNED - COLONOSCOPY (04/27/2007 00:00) colon polyp, recall 3 year.      No family history of colon cancer/polyps.     Past Medical History:   Diagnosis Date    Arthritis     gout    BPH with urinary obstruction     Calculus of kidney     Calculus of ureter     Hearing loss     Hypertension     Kidney stones     Microscopic hematuria     Nocturia        Past Surgical History:   Procedure Laterality Date    CARDIAC CATHETERIZATION      CARPAL TUNNEL RELEASE      COLONOSCOPY      EXTRACORPOREAL SHOCK WAVE LITHOTRIPSY (ESWL) Left 3/1/2017    Procedure: LEFT EXTRACORPOREAL SHOCKWAVE LITHOTRIPSY;  Surgeon: Emanuel Franco MD;  Location: [facility] OR;  Service:     EXTRACORPOREAL SHOCK WAVE LITHOTRIPSY (ESWL) Left 6/14/2017    Procedure: EXTRACORPOREAL SHOCKWAVE LITHOTRIPSY;  Surgeon: Emanuel Franco MD;  Location: [facility] OR;  Service:     EXTRACORPOREAL SHOCK WAVE LITHOTRIPSY (ESWL) Left 10/28/2019    Procedure: LEFT EXTRACORPOREAL SHOCKWAVE LITHOTRIPSY;  Surgeon: Maninder Hadley MD;  Location:  [facility] OR;  Service: Urology    EXTRACORPOREAL SHOCK WAVE LITHOTRIPSY (ESWL) Right 6/4/2021    Procedure: RIGHT EXTRACORPOREAL SHOCKWAVE LITHOTRIPSY;  Surgeon: Maninder Hadley MD;  Location:  [facility] OR;  Service: Urology;  Laterality: Right;    EXTRACORPOREAL SHOCKWAVE LITHOTRIPSY (ESWL), STENT INSERTION/REMOVAL      HEMORRHOIDECTOMY      KNEE SURGERY Left 1997     arthroscopy       Outpatient Medications Marked as Taking for the 25 encounter (Office Visit) with Monica Gordon APRN   Medication Sig Dispense Refill    amLODIPine (NORVASC) 5 MG tablet Take 2 tablets by mouth Daily. Take 10 mg  5    hydroCHLOROthiazide 12.5 MG tablet       losartan (COZAAR) 100 MG tablet       Multiple Vitamins-Minerals (MULTIVITAMIN ADULT PO) Take 1 tablet by mouth Daily.      tamsulosin (FLOMAX) 0.4 MG capsule 24 hr capsule Take 1 capsule by mouth Every Night. 90 capsule 5       Allergies   Allergen Reactions    Shellfish-Derived Products Hives and Swelling       Social History     Socioeconomic History    Marital status:    Tobacco Use    Smoking status: Former     Types: Pipe     Quit date: 1973     Years since quittin.1     Passive exposure: Never    Smokeless tobacco: Never    Tobacco comments:     only a pipe smoker and quit many years ago   Vaping Use    Vaping status: Never Used   Substance and Sexual Activity    Alcohol use: No    Drug use: No    Sexual activity: Defer       Family History   Problem Relation Age of Onset    No Known Problems Mother     No Known Problems Father     Colon cancer Neg Hx        Review of Systems   Constitutional:  Negative for chills, fever and unexpected weight change.   Respiratory:  Negative for shortness of breath.    Cardiovascular:  Negative for chest pain.   Gastrointestinal:  Negative for abdominal distention, abdominal pain, anal bleeding, blood in stool, constipation, diarrhea, nausea and vomiting.       Objective     Vitals:    25 1253   BP: 120/60   Pulse: 76   Temp: 97.7 °F (36.5 °C)   SpO2: 98%         25  1253   Weight: 101 kg (223 lb 3.2 oz)     Body mass index is 28.66 kg/m².    Physical Exam  Vitals reviewed.   Constitutional:       General: He is not in acute distress.  Cardiovascular:      Rate and Rhythm: Normal rate and regular rhythm.      Heart sounds: Normal heart sounds.   Pulmonary:      Effort:  Pulmonary effort is normal.      Breath sounds: Normal breath sounds.   Abdominal:      General: Bowel sounds are normal. There is no distension.      Palpations: Abdomen is soft.      Tenderness: There is no abdominal tenderness.   Skin:     General: Skin is warm and dry.   Neurological:      Mental Status: He is alert.         Imaging Results (Most Recent)       None            Assessment & Plan     Diagnoses and all orders for this visit:    1. History of adenomatous polyp of colon (Primary)  -     Case Request; Standing  -     Case Request    Other orders  -     Implement Anesthesia Orders Day of Procedure; Standing  -     Follow Anesthesia Guidelines / Protocol; Future    Schedule colonoscopy. Miralax prep.                  COLONOSCOPY WITH ANESTHESIA (N/A)  All risks, benefits, alternatives, and indications of colonoscopy procedure have been discussed with the patient. Risks to include perforation of the colon requiring possible surgery or colostomy, risk of bleeding from biopsies or removal of colon tissue, possibility of missing a colon polyp or cancer, or adverse drug reaction.  Benefits to include the diagnosis and management of disease of the colon and rectum. Alternatives to include barium enema, radiographic evaluation, lab testing or no intervention. Pt verbalizes understanding and agrees.     There are no Patient Instructions on file for this visit.    MIKAYLA [place]

## 2025-04-24 ENCOUNTER — ANESTHESIA (OUTPATIENT)
Dept: GASTROENTEROLOGY | Facility: HOSPITAL | Age: 75
End: 2025-04-24
Payer: MEDICARE

## 2025-04-24 ENCOUNTER — ANESTHESIA EVENT (OUTPATIENT)
Dept: GASTROENTEROLOGY | Facility: HOSPITAL | Age: 75
End: 2025-04-24
Payer: MEDICARE

## 2025-04-24 ENCOUNTER — HOSPITAL ENCOUNTER (OUTPATIENT)
Facility: HOSPITAL | Age: 75
Setting detail: HOSPITAL OUTPATIENT SURGERY
Discharge: HOME | End: 2025-04-24
Attending: INTERNAL MEDICINE | Admitting: INTERNAL MEDICINE
Payer: MEDICARE

## 2025-04-24 VITALS
HEIGHT: 74 IN | HEART RATE: 70 BPM | DIASTOLIC BLOOD PRESSURE: 77 MMHG | OXYGEN SATURATION: 100 % | BODY MASS INDEX: 25.93 KG/M2 | TEMPERATURE: 96.8 F | RESPIRATION RATE: 18 BRPM | WEIGHT: 202 LBS | SYSTOLIC BLOOD PRESSURE: 138 MMHG

## 2025-04-24 DIAGNOSIS — Z86.0101 HISTORY OF ADENOMATOUS POLYP OF COLON: ICD-10-CM

## 2025-04-24 PROCEDURE — 25010000002 LIDOCAINE PF 2% 2 % SOLUTION: Performed by: NURSE ANESTHETIST, CERTIFIED REGISTERED

## 2025-04-24 PROCEDURE — 25010000002 PROPOFOL 10 MG/ML EMULSION: Performed by: NURSE ANESTHETIST, CERTIFIED REGISTERED

## 2025-04-24 PROCEDURE — 88305 TISSUE EXAM BY PATHOLOGIST: CPT | Performed by: INTERNAL MEDICINE

## 2025-04-24 PROCEDURE — 25810000003 SODIUM CHLORIDE 0.9 % SOLUTION: Performed by: ANESTHESIOLOGY

## 2025-04-24 PROCEDURE — 45385 COLONOSCOPY W/LESION REMOVAL: CPT | Performed by: INTERNAL MEDICINE

## 2025-04-24 RX ORDER — SODIUM CHLORIDE 0.9 % (FLUSH) 0.9 %
10 SYRINGE (ML) INJECTION AS NEEDED
Status: DISCONTINUED | OUTPATIENT
Start: 2025-04-24 | End: 2025-04-24 | Stop reason: HOSPADM

## 2025-04-24 RX ORDER — PROPOFOL 10 MG/ML
VIAL (ML) INTRAVENOUS AS NEEDED
Status: DISCONTINUED | OUTPATIENT
Start: 2025-04-24 | End: 2025-04-24 | Stop reason: SURG

## 2025-04-24 RX ORDER — SODIUM CHLORIDE 9 MG/ML
500 INJECTION, SOLUTION INTRAVENOUS CONTINUOUS PRN
Status: DISCONTINUED | OUTPATIENT
Start: 2025-04-24 | End: 2025-04-24 | Stop reason: HOSPADM

## 2025-04-24 RX ORDER — LIDOCAINE HYDROCHLORIDE 10 MG/ML
0.5 INJECTION, SOLUTION EPIDURAL; INFILTRATION; INTRACAUDAL; PERINEURAL ONCE AS NEEDED
Status: DISCONTINUED | OUTPATIENT
Start: 2025-04-24 | End: 2025-04-24 | Stop reason: HOSPADM

## 2025-04-24 RX ORDER — FERROUS GLUCONATE 324(37.5)
324 TABLET ORAL
COMMUNITY

## 2025-04-24 RX ORDER — ONDANSETRON 2 MG/ML
4 INJECTION INTRAMUSCULAR; INTRAVENOUS ONCE AS NEEDED
Status: DISCONTINUED | OUTPATIENT
Start: 2025-04-24 | End: 2025-04-24 | Stop reason: HOSPADM

## 2025-04-24 RX ORDER — LIDOCAINE HYDROCHLORIDE 20 MG/ML
INJECTION, SOLUTION EPIDURAL; INFILTRATION; INTRACAUDAL; PERINEURAL AS NEEDED
Status: DISCONTINUED | OUTPATIENT
Start: 2025-04-24 | End: 2025-04-24 | Stop reason: SURG

## 2025-04-24 RX ADMIN — SODIUM CHLORIDE 500 ML: 0.9 INJECTION, SOLUTION INTRAVENOUS at 08:36

## 2025-04-24 RX ADMIN — LIDOCAINE HYDROCHLORIDE 100 MG: 20 INJECTION, SOLUTION EPIDURAL; INFILTRATION; INTRACAUDAL; PERINEURAL at 09:55

## 2025-04-24 RX ADMIN — PROPOFOL 60 MG: 10 INJECTION, EMULSION INTRAVENOUS at 09:55

## 2025-04-24 RX ADMIN — PROPOFOL 50 MG: 10 INJECTION, EMULSION INTRAVENOUS at 10:01

## 2025-04-24 RX ADMIN — PROPOFOL 30 MG: 10 INJECTION, EMULSION INTRAVENOUS at 10:08

## 2025-04-24 NOTE — ANESTHESIA POSTPROCEDURE EVALUATION
"Patient: Martin Durán    Procedure Summary       Date: 04/24/25 Room / Location: [facility] ENDOSCOPY 4 / [facility]    Anesthesia Start: 0950 Anesthesia Stop: 1017    Procedure: COLONOSCOPY WITH ANESTHESIA Diagnosis:       History of adenomatous polyp of colon      (History of adenomatous polyp of colon [Z86.0101])    Surgeons: Travis Chung MD Provider: Messi Steele CRNA    Anesthesia Type: MAC ASA Status: 3            Anesthesia Type: MAC    Vitals  No vitals data found for the desired time range.          Post Anesthesia Care and Evaluation    Patient location during evaluation: PHASE II  Patient participation: complete - patient participated  Level of consciousness: awake  Pain score: 0  Pain management: adequate    Airway patency: patent  Anesthetic complications: No anesthetic complications  PONV Status: none  Cardiovascular status: acceptable  Respiratory status: acceptable  Hydration status: acceptable    Comments: Blood pressure (!) 191/72, pulse 93, temperature 96.8 °F (36 °C), temperature source Temporal, resp. rate 18, height 188 cm (74\"), weight 91.6 kg (202 lb), SpO2 99%.      "

## 2025-04-24 NOTE — H&P
[facility]astroenter[facility]  Pre Procedure History & Physical    Chief Complaint:   Colon polyps    Subjective     HPI:   The patient has a history of colon polyps who presents for exam.    Past Medical History:   Past Medical History:   Diagnosis Date    Arthritis     gout    BPH with urinary obstruction     Calculus of kidney     Calculus of ureter     Hearing loss     Hypertension     Kidney stones     Microscopic hematuria     Nocturia        Past Surgical History:  Past Surgical History:   Procedure Laterality Date    CARDIAC CATHETERIZATION      CARPAL TUNNEL RELEASE      COLONOSCOPY      EXTRACORPOREAL SHOCK WAVE LITHOTRIPSY (ESWL) Left 3/1/2017    Procedure: LEFT EXTRACORPOREAL SHOCKWAVE LITHOTRIPSY;  Surgeon: Emanuel Franco MD;  Location: [facility] OR;  Service:     EXTRACORPOREAL SHOCK WAVE LITHOTRIPSY (ESWL) Left 6/14/2017    Procedure: EXTRACORPOREAL SHOCKWAVE LITHOTRIPSY;  Surgeon: Emanuel Franco MD;  Location: [facility] OR;  Service:     EXTRACORPOREAL SHOCK WAVE LITHOTRIPSY (ESWL) Left 10/28/2019    Procedure: LEFT EXTRACORPOREAL SHOCKWAVE LITHOTRIPSY;  Surgeon: Maninder Hadley MD;  Location:  [facility] OR;  Service: Urology    EXTRACORPOREAL SHOCK WAVE LITHOTRIPSY (ESWL) Right 6/4/2021    Procedure: RIGHT EXTRACORPOREAL SHOCKWAVE LITHOTRIPSY;  Surgeon: Maninder Hadley MD;  Location: [facility] OR;  Service: Urology;  Laterality: Right;    EXTRACORPOREAL SHOCKWAVE LITHOTRIPSY (ESWL), STENT INSERTION/REMOVAL      HEMORRHOIDECTOMY      KNEE SURGERY Left 1997    arthroscopy       Family History:  Family History   Problem Relation Age of Onset    No Known Problems Mother     No Known Problems Father     Colon cancer Neg Hx        Social History:   reports that he quit smoking about 52 years ago. His smoking use included pipe. He has never been exposed to tobacco smoke. He has never used smokeless tobacco. He reports that he does not drink alcohol and does not use drugs.    Medications:   Prior to  "Admission medications    Medication Sig Start Date End Date Taking? Authorizing Provider   amLODIPine (NORVASC) 5 MG tablet Take 2 tablets by mouth Daily. Take 10 mg 1/19/17  Yes Kala Simmons MD   hydroCHLOROthiazide 12.5 MG tablet  2/18/25  Yes Kala Simmons MD   losartan (COZAAR) 100 MG tablet  2/4/25  Yes Kala Simmons MD   tamsulosin (FLOMAX) 0.4 MG capsule 24 hr capsule Take 1 capsule by mouth Every Night. 10/2/24  Yes Maninder Hadley MD   ferrous gluconate 324 (37.5 Fe) MG tablet tablet Take 1 tablet by mouth Daily With Breakfast.    Kala Simmons MD   Multiple Vitamins-Minerals (MULTIVITAMIN ADULT PO) Take 1 tablet by mouth Daily.    Kala Simmons MD   allopurinol (ZYLOPRIM) 300 MG tablet Take 1 tablet by mouth Daily.  Patient not taking: Reported on 2/25/2025 1/19/17 4/24/25  Kala Simmons MD   atenolol (TENORMIN) 50 MG tablet Take 1 tablet by mouth Daily.  Patient not taking: Reported on 2/25/2025 1/5/17 4/24/25  Kala Simmons MD   meloxicam (MOBIC) 7.5 MG tablet Take 1 tablet by mouth Daily. Stopped prior to surgery on 6/1/17  Patient not taking: Reported on 2/25/2025 1/31/17 4/24/25  Kala Simmons MD       Allergies:  Shellfish-derived products    ROS:    General: Weight stable  Resp: No SOA  Cardiovascular: No CP    Objective     Blood pressure (!) 191/72, pulse 93, temperature 96.8 °F (36 °C), temperature source Temporal, resp. rate 18, height 188 cm (74\"), weight 91.6 kg (202 lb), SpO2 99%.    Physical Exam   Constitutional: Pt is oriented to person, place, and in no distress.   Cardiovascular: Normal rate, regular rhythm.    Pulmonary/Chest: Effort normal. No respiratory distress.   Abdominal:  Non-distended.  Psychiatric: Mood, memory, affect and judgment appear normal.     Assessment & Plan     Diagnosis:  Colon polyps    Anticipated Surgical Procedure:  Colonoscopy    The risks, benefits, and alternatives of this procedure have " been discussed with the patient or the responsible party- the patient understands and agrees to proceed.      EMR Dragon/transcription disclaimer:  Much of this encounter note is electronic transcription/translation of spoken language to printed text.  The electronic translation of spoken language may be erroneous, or at times, nonsensical words or phrases may be inadvertently transcribed.  Although I have reviewed the note for such errors, some may still exist.

## 2025-04-24 NOTE — ANESTHESIA PREPROCEDURE EVALUATION
Anesthesia Evaluation     Patient summary reviewed   no history of anesthetic complications:   NPO Solid Status: > 8 hours             Airway   Mallampati: II  TM distance: >3 FB  Neck ROM: full  Dental    (+) upper dentures and lower dentures    Pulmonary    (-) asthma, sleep apnea, not a smoker  Cardiovascular   Exercise tolerance: good (4-7 METS)    (+) hypertension 2 medications or greater  (-) pacemaker, past MI, angina, cardiac stents      Neuro/Psych  (-) seizures, TIA, CVA  GI/Hepatic/Renal/Endo    (+) renal disease- stones  (-) GERD, hepatitis, liver disease, diabetes    Musculoskeletal     Abdominal    Substance History      OB/GYN          Other   arthritis,                   Anesthesia Plan    ASA 3     MAC     intravenous induction     Anesthetic plan, risks, benefits, and alternatives have been provided, discussed and informed consent has been obtained with: patient.

## 2025-04-28 LAB
CYTO UR: NORMAL
LAB AP CASE REPORT: NORMAL
Lab: NORMAL
PATH REPORT.FINAL DX SPEC: NORMAL
PATH REPORT.GROSS SPEC: NORMAL

## 2025-08-12 ENCOUNTER — OFFICE VISIT (OUTPATIENT)
Age: 75
End: 2025-08-12
Payer: MEDICARE

## 2025-08-12 VITALS
DIASTOLIC BLOOD PRESSURE: 80 MMHG | OXYGEN SATURATION: 98 % | SYSTOLIC BLOOD PRESSURE: 132 MMHG | WEIGHT: 202 LBS | BODY MASS INDEX: 25.93 KG/M2 | HEIGHT: 74 IN | HEART RATE: 77 BPM

## 2025-08-12 DIAGNOSIS — B35.1 ONYCHOMYCOSIS: Primary | ICD-10-CM

## 2025-08-12 DIAGNOSIS — L60.2 ONYCHOGRYPHOSIS: ICD-10-CM

## 2025-08-12 DIAGNOSIS — M79.675 PAIN IN TOES OF BOTH FEET: ICD-10-CM

## 2025-08-12 DIAGNOSIS — M79.674 PAIN IN TOES OF BOTH FEET: ICD-10-CM

## 2025-08-12 PROBLEM — M10.9 GOUT: Status: ACTIVE | Noted: 2024-09-11

## 2025-08-12 PROBLEM — I10 ESSENTIAL HYPERTENSION: Status: ACTIVE | Noted: 2024-09-11

## 2025-08-12 PROBLEM — M19.90 OSTEOARTHRITIS: Status: ACTIVE | Noted: 2024-09-11

## 2025-08-12 PROCEDURE — 99213 OFFICE O/P EST LOW 20 MIN: CPT | Performed by: NURSE PRACTITIONER

## 2025-08-12 PROCEDURE — 1160F RVW MEDS BY RX/DR IN RCRD: CPT | Performed by: NURSE PRACTITIONER

## 2025-08-12 PROCEDURE — 3079F DIAST BP 80-89 MM HG: CPT | Performed by: NURSE PRACTITIONER

## 2025-08-12 PROCEDURE — 3075F SYST BP GE 130 - 139MM HG: CPT | Performed by: NURSE PRACTITIONER

## 2025-08-12 PROCEDURE — 1159F MED LIST DOCD IN RCRD: CPT | Performed by: NURSE PRACTITIONER

## 2025-08-22 ENCOUNTER — TELEPHONE (OUTPATIENT)
Age: 75
End: 2025-08-22
Payer: MEDICARE

## (undated) DEVICE — DEFENDO AIR WATER SUCTION AND BIOPSY VALVE KIT FOR  OLYMPUS: Brand: DEFENDO AIR/WATER/SUCTION AND BIOPSY VALVE

## (undated) DEVICE — MASK,OXYGEN,MED CONC,ADLT,7' TUB, UC: Brand: PENDING

## (undated) DEVICE — SENSR O2 OXIMAX FNGR A/ 18IN NONSTR

## (undated) DEVICE — THE SINGLE USE ETRAP – POLYP TRAP IS USED FOR SUCTION RETRIEVAL OF ENDOSCOPICALLY REMOVED POLYPS.: Brand: ETRAP

## (undated) DEVICE — CUFF,BP,DISP,1 TUBE,ADULT,HP: Brand: MEDLINE

## (undated) DEVICE — Device: Brand: SINGLE USE ELECTROSURGICAL SNARE SD-400

## (undated) DEVICE — ARGYLE YANKAUER BULB TIP WITH VENT: Brand: ARGYLE

## (undated) DEVICE — THE CHANNEL CLEANING BRUSH IS A NYLON FLEXI BRUSH ATTACHED TO A FLEXIBLE PLASTIC SHEATH DESIGNED TO SAFELY REMOVE DEBRIS FROM FLEXIBLE ENDOSCOPES.